# Patient Record
Sex: MALE | Race: BLACK OR AFRICAN AMERICAN | NOT HISPANIC OR LATINO | Employment: OTHER | URBAN - METROPOLITAN AREA
[De-identification: names, ages, dates, MRNs, and addresses within clinical notes are randomized per-mention and may not be internally consistent; named-entity substitution may affect disease eponyms.]

---

## 2017-07-25 ENCOUNTER — APPOINTMENT (EMERGENCY)
Dept: CT IMAGING | Facility: HOSPITAL | Age: 67
End: 2017-07-25
Payer: MEDICARE

## 2017-07-25 ENCOUNTER — HOSPITAL ENCOUNTER (EMERGENCY)
Facility: HOSPITAL | Age: 67
Discharge: HOME | End: 2017-07-26
Attending: EMERGENCY MEDICINE | Admitting: EMERGENCY MEDICINE
Payer: MEDICARE
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
## 2017-07-25 DIAGNOSIS — S09.90XA MINOR HEAD INJURY, INITIAL ENCOUNTER: ICD-10-CM

## 2017-07-25 DIAGNOSIS — F10.929 ALCOHOL INTOXICATION (HCC): Primary | ICD-10-CM

## 2017-07-25 LAB
ALBUMIN SERPL BCP-MCNC: 4 G/DL (ref 3.5–5)
ALP SERPL-CCNC: 61 U/L (ref 46–116)
ALT SERPL W P-5'-P-CCNC: 51 U/L (ref 12–78)
ANION GAP SERPL CALCULATED.3IONS-SCNC: 7 MMOL/L (ref 4–13)
AST SERPL W P-5'-P-CCNC: 92 U/L (ref 5–45)
ATRIAL RATE: 90 BPM
BASOPHILS # BLD AUTO: 0.02 THOUSANDS/ΜL (ref 0–0.1)
BASOPHILS NFR BLD AUTO: 0 % (ref 0–1)
BILIRUB SERPL-MCNC: 0.22 MG/DL (ref 0.2–1)
BUN SERPL-MCNC: 13 MG/DL (ref 5–25)
CALCIUM SERPL-MCNC: 8.9 MG/DL (ref 8.3–10.1)
CHLORIDE SERPL-SCNC: 101 MMOL/L (ref 100–108)
CO2 SERPL-SCNC: 28 MMOL/L (ref 21–32)
CREAT SERPL-MCNC: 1.67 MG/DL (ref 0.6–1.3)
EOSINOPHIL # BLD AUTO: 0.1 THOUSAND/ΜL (ref 0–0.61)
EOSINOPHIL NFR BLD AUTO: 2 % (ref 0–6)
ERYTHROCYTE [DISTWIDTH] IN BLOOD BY AUTOMATED COUNT: 14.5 % (ref 11.6–15.1)
ETHANOL SERPL-MCNC: 583 MG/DL (ref 0–3)
GFR SERPL CREATININE-BSD FRML MDRD: 42 ML/MIN/1.73SQ M
GLUCOSE SERPL-MCNC: 88 MG/DL (ref 65–140)
HCT VFR BLD AUTO: 31.8 % (ref 36.5–49.3)
HGB BLD-MCNC: 10.9 G/DL (ref 12–17)
LYMPHOCYTES # BLD AUTO: 2.5 THOUSANDS/ΜL (ref 0.6–4.47)
LYMPHOCYTES NFR BLD AUTO: 45 % (ref 14–44)
MCH RBC QN AUTO: 31 PG (ref 26.8–34.3)
MCHC RBC AUTO-ENTMCNC: 34.3 G/DL (ref 31.4–37.4)
MCV RBC AUTO: 90 FL (ref 82–98)
MONOCYTES # BLD AUTO: 0.48 THOUSAND/ΜL (ref 0.17–1.22)
MONOCYTES NFR BLD AUTO: 9 % (ref 4–12)
NEUTROPHILS # BLD AUTO: 2.48 THOUSANDS/ΜL (ref 1.85–7.62)
NEUTS SEG NFR BLD AUTO: 44 % (ref 43–75)
NRBC BLD AUTO-RTO: 0 /100 WBCS
P AXIS: 61 DEGREES
PLATELET # BLD AUTO: 193 THOUSANDS/UL (ref 149–390)
PMV BLD AUTO: 9.6 FL (ref 8.9–12.7)
POTASSIUM SERPL-SCNC: 4.5 MMOL/L (ref 3.5–5.3)
PR INTERVAL: 156 MS
PROT SERPL-MCNC: 8 G/DL (ref 6.4–8.2)
QRS AXIS: 77 DEGREES
QRSD INTERVAL: 84 MS
QT INTERVAL: 348 MS
QTC INTERVAL: 425 MS
RBC # BLD AUTO: 3.52 MILLION/UL (ref 3.88–5.62)
SODIUM SERPL-SCNC: 136 MMOL/L (ref 136–145)
T WAVE AXIS: -5 DEGREES
VENTRICULAR RATE: 90 BPM
WBC # BLD AUTO: 5.58 THOUSAND/UL (ref 4.31–10.16)

## 2017-07-25 PROCEDURE — 80053 COMPREHEN METABOLIC PANEL: CPT | Performed by: EMERGENCY MEDICINE

## 2017-07-25 PROCEDURE — 80320 DRUG SCREEN QUANTALCOHOLS: CPT | Performed by: EMERGENCY MEDICINE

## 2017-07-25 PROCEDURE — 96372 THER/PROPH/DIAG INJ SC/IM: CPT

## 2017-07-25 PROCEDURE — 93005 ELECTROCARDIOGRAM TRACING: CPT | Performed by: EMERGENCY MEDICINE

## 2017-07-25 PROCEDURE — 36415 COLL VENOUS BLD VENIPUNCTURE: CPT | Performed by: EMERGENCY MEDICINE

## 2017-07-25 PROCEDURE — 70450 CT HEAD/BRAIN W/O DYE: CPT

## 2017-07-25 PROCEDURE — 72125 CT NECK SPINE W/O DYE: CPT

## 2017-07-25 PROCEDURE — 85025 COMPLETE CBC W/AUTO DIFF WBC: CPT | Performed by: EMERGENCY MEDICINE

## 2017-07-25 RX ORDER — ZIPRASIDONE MESYLATE 20 MG/ML
10 INJECTION, POWDER, LYOPHILIZED, FOR SOLUTION INTRAMUSCULAR ONCE
Status: COMPLETED | OUTPATIENT
Start: 2017-07-25 | End: 2017-07-25

## 2017-07-25 RX ADMIN — ZIPRASIDONE MESYLATE 10 MG: 20 INJECTION, POWDER, LYOPHILIZED, FOR SOLUTION INTRAMUSCULAR at 20:47

## 2017-07-25 RX ADMIN — WATER 0.6 ML: 1 INJECTION INTRAMUSCULAR; INTRAVENOUS; SUBCUTANEOUS at 20:48

## 2017-07-26 VITALS
HEART RATE: 72 BPM | DIASTOLIC BLOOD PRESSURE: 71 MMHG | SYSTOLIC BLOOD PRESSURE: 136 MMHG | WEIGHT: 154.54 LBS | OXYGEN SATURATION: 98 % | RESPIRATION RATE: 16 BRPM | TEMPERATURE: 98.5 F

## 2017-07-26 LAB
ETHANOL EXG-MCNC: 0.06 MG/DL
ETHANOL EXG-MCNC: 0.14 MG/DL
ETHANOL SERPL-MCNC: 137 MG/DL (ref 0–3)

## 2017-07-26 PROCEDURE — 82075 ASSAY OF BREATH ETHANOL: CPT | Performed by: EMERGENCY MEDICINE

## 2017-07-26 PROCEDURE — 80320 DRUG SCREEN QUANTALCOHOLS: CPT | Performed by: EMERGENCY MEDICINE

## 2017-07-26 PROCEDURE — 99285 EMERGENCY DEPT VISIT HI MDM: CPT

## 2017-07-26 PROCEDURE — 36415 COLL VENOUS BLD VENIPUNCTURE: CPT | Performed by: EMERGENCY MEDICINE

## 2017-09-29 ENCOUNTER — ALLSCRIPTS OFFICE VISIT (OUTPATIENT)
Dept: OTHER | Facility: OTHER | Age: 67
End: 2017-09-29

## 2017-09-29 DIAGNOSIS — M54.50 LOW BACK PAIN: ICD-10-CM

## 2017-09-29 DIAGNOSIS — Z12.5 ENCOUNTER FOR SCREENING FOR MALIGNANT NEOPLASM OF PROSTATE: ICD-10-CM

## 2017-10-04 ENCOUNTER — TRANSCRIBE ORDERS (OUTPATIENT)
Dept: ADMINISTRATIVE | Facility: HOSPITAL | Age: 67
End: 2017-10-04

## 2017-10-04 ENCOUNTER — APPOINTMENT (OUTPATIENT)
Dept: LAB | Facility: MEDICAL CENTER | Age: 67
End: 2017-10-04
Payer: MEDICARE

## 2017-10-04 DIAGNOSIS — M54.50 LOW BACK PAIN: ICD-10-CM

## 2017-10-04 DIAGNOSIS — Z12.5 ENCOUNTER FOR SCREENING FOR MALIGNANT NEOPLASM OF PROSTATE: ICD-10-CM

## 2017-10-04 LAB
ALBUMIN SERPL BCP-MCNC: 4 G/DL (ref 3.5–5)
ALP SERPL-CCNC: 63 U/L (ref 46–116)
ALT SERPL W P-5'-P-CCNC: 27 U/L (ref 12–78)
ANION GAP SERPL CALCULATED.3IONS-SCNC: 7 MMOL/L (ref 4–13)
AST SERPL W P-5'-P-CCNC: 43 U/L (ref 5–45)
BASOPHILS # BLD AUTO: 0.01 THOUSANDS/ΜL (ref 0–0.1)
BASOPHILS NFR BLD AUTO: 0 % (ref 0–1)
BILIRUB SERPL-MCNC: 0.58 MG/DL (ref 0.2–1)
BUN SERPL-MCNC: 13 MG/DL (ref 5–25)
CALCIUM SERPL-MCNC: 9.7 MG/DL (ref 8.3–10.1)
CHLORIDE SERPL-SCNC: 101 MMOL/L (ref 100–108)
CO2 SERPL-SCNC: 28 MMOL/L (ref 21–32)
CREAT SERPL-MCNC: 1.47 MG/DL (ref 0.6–1.3)
CRP SERPL QL: <3 MG/L
EOSINOPHIL # BLD AUTO: 0.04 THOUSAND/ΜL (ref 0–0.61)
EOSINOPHIL NFR BLD AUTO: 1 % (ref 0–6)
ERYTHROCYTE [DISTWIDTH] IN BLOOD BY AUTOMATED COUNT: 14.1 % (ref 11.6–15.1)
ERYTHROCYTE [SEDIMENTATION RATE] IN BLOOD: 16 MM/HOUR (ref 0–10)
GFR SERPL CREATININE-BSD FRML MDRD: 57 ML/MIN/1.73SQ M
GLUCOSE SERPL-MCNC: 99 MG/DL (ref 65–140)
HCT VFR BLD AUTO: 34.4 % (ref 36.5–49.3)
HGB BLD-MCNC: 11.7 G/DL (ref 12–17)
LYMPHOCYTES # BLD AUTO: 2.08 THOUSANDS/ΜL (ref 0.6–4.47)
LYMPHOCYTES NFR BLD AUTO: 46 % (ref 14–44)
MCH RBC QN AUTO: 31.5 PG (ref 26.8–34.3)
MCHC RBC AUTO-ENTMCNC: 34 G/DL (ref 31.4–37.4)
MCV RBC AUTO: 93 FL (ref 82–98)
MONOCYTES # BLD AUTO: 0.3 THOUSAND/ΜL (ref 0.17–1.22)
MONOCYTES NFR BLD AUTO: 7 % (ref 4–12)
NEUTROPHILS # BLD AUTO: 2.13 THOUSANDS/ΜL (ref 1.85–7.62)
NEUTS SEG NFR BLD AUTO: 46 % (ref 43–75)
NRBC BLD AUTO-RTO: 0 /100 WBCS
PLATELET # BLD AUTO: 275 THOUSANDS/UL (ref 149–390)
PMV BLD AUTO: 9.8 FL (ref 8.9–12.7)
POTASSIUM SERPL-SCNC: 4.2 MMOL/L (ref 3.5–5.3)
PROT SERPL-MCNC: 8.3 G/DL (ref 6.4–8.2)
PSA SERPL-MCNC: 3.6 NG/ML (ref 0–4)
RBC # BLD AUTO: 3.72 MILLION/UL (ref 3.88–5.62)
SODIUM SERPL-SCNC: 136 MMOL/L (ref 136–145)
WBC # BLD AUTO: 4.56 THOUSAND/UL (ref 4.31–10.16)

## 2017-10-04 PROCEDURE — 80053 COMPREHEN METABOLIC PANEL: CPT

## 2017-10-04 PROCEDURE — 85025 COMPLETE CBC W/AUTO DIFF WBC: CPT

## 2017-10-04 PROCEDURE — 36415 COLL VENOUS BLD VENIPUNCTURE: CPT

## 2017-10-04 PROCEDURE — 86140 C-REACTIVE PROTEIN: CPT

## 2017-10-04 PROCEDURE — 85652 RBC SED RATE AUTOMATED: CPT

## 2017-10-04 PROCEDURE — G0103 PSA SCREENING: HCPCS

## 2017-10-05 ENCOUNTER — GENERIC CONVERSION - ENCOUNTER (OUTPATIENT)
Dept: OTHER | Facility: OTHER | Age: 67
End: 2017-10-05

## 2018-01-10 NOTE — RESULT NOTES
Verified Results  (1) PSA (SCREEN) (Dx V76 44 Screen for Prostate Cancer) [id number] 11:54AM Brittny Arredondo Order Number: [id number]     Order Number: [id number]     Test Name Result Flag Reference   PROSTATE SPECIFIC ANTIGEN 2 0 ng/mL  0 0-4 0     (1) URINALYSIS (will reflex a microscopy if leukocytes, occult blood, protein or nitrites are not within normal limits) [id number] 11:48AM Brittny Arredondo Order Number: [id number]    TW Order Number: [id number]     Test Name Result Flag Reference   COLOR Yellow     CLARITY Cloudy     SPECIFIC GRAVITY UA 1 019  1 003-1 030   PH UA 5 0  4 5-8 0   LEUKOCYTE ESTERASE UA Negative  Negative   NITRITE UA Negative  Negative   PROTEIN UA Trace mg/dl A Negative   GLUCOSE  (1/4%) mg/dl A Negative   KETONES UA Trace mg/dl A Negative   UROBILINOGEN UA 0 2 E U /dl  0 2, 1 0 E U /dl   BILIRUBIN UA Negative  Negative   BLOOD UA Trace A Negative   BACTERIA None Seen /hpf  None Seen, Occasional   EPITHELIAL CELLS None Seen /hpf  None Seen, Occasional   HYALINE CASTS 5-10 /lpf A 0-3   RBC UA None Seen /hpf  None Seen, 2-4, 0-1, 1-2   WBC UA None Seen /hpf  None Seen, 2-4     (1) COMPREHENSIVE METABOLIC PANEL [id number] 22:36TI Juana Alberto    Order Number: [id number]      National Kidney Disease Education Program recommendations are as follows:  GFR calculation is accurate only with a steady state creatinine  Chronic Kidney disease less than 60 ml/min/1 73 sq  meters  Kidney failure less than 15 ml/min/1 73 sq  meters  Test Name Result Flag Reference   GLUCOSE,RANDM 75 mg/dL     If the patient is fasting, the ADA then defines impaired fasting glucose as > 100 mg/dL and diabetes as > or equal to 123 mg/dL     SODIUM 142 mmol/L  136-145   POTASSIUM 4 0 mmol/L  3 5-5 3   CHLORIDE 108 mmol/L  100-108   CARBON DIOXIDE 28 mmol/L  21-32   ANION GAP (CALC) 6 mmol/L  4-13   BLOOD UREA NITROGEN 10 mg/dL  5-25   CREATININE 1 13 mg/dL  0 60-1 30   Standardized to [facility] reference method   CALCIUM 8 0 mg/dL L 8 3-10 1   BILI, TOTAL 0 24 mg/dL  0 20-1 00   ALK PHOSPHATAS 62 U/L     ALT (SGPT) 20 U/L  12-78   AST(SGOT) 19 U/L  5-45   ALBUMIN 3 5 g/dL  3 5-5 0   TOTAL PROTEIN 7 0 g/dL  6 4-8 2   eGFR Non-African American      >60 0 ml/min/1 73sq m     (1) LIPID PANEL, FASTING [id number] 11:42AM Elver Fenton Order Number: [id number]      Triglyceride:         Normal              <150 mg/dl       Borderline High    150-199 mg/dl       High               200-499 mg/dl       Very High          >499 mg/dl  Cholesterol:         Desirable        <200 mg/dl      Borderline High  200-239 mg/dl      High             >239 mg/dl  HDL Cholesterol:        High    >59 mg/dL      Low     <41 mg/dL  LDL CALCULATED:    This screening LDL is a calculated result  It does not have the accuracy of the Direct Measured LDL in the monitoring of patients with hyperlipidemia and/or statin therapy  Direct Measure LDL (WEB165) must be ordered separately in these patients  Test Name Result Flag Reference   CHOLESTEROL 234 mg/dL H    HDL,DIRECT 115 mg/dL H 40-60   LDL CHOLESTEROL CALCULATED 103 mg/dL H 0-100   TRIGLYCERIDES 78 mg/dL  <=150     (1) TSH [id number] 11:42AM Elver Fenton Order Number: [id number]    Patients undergoing fluorescein dye angiography may retain small amounts of fluorescein in the body for 48-72 hours post procedure  Samples containing fluorescein can produce falsely depressed TSH values  If the patient had this procedure,a specimen should be resubmitted post fluorescein clearance  Test Name Result Flag Reference   TSH 1 070 uIU/mL  0 358-3 740       Plan  Glucosuria    · (1) BASIC METABOLIC PROFILE; Status:Active;  Requested GPJ:[id number];

## 2018-01-12 NOTE — RESULT NOTES
Verified Results  (1) CBC/PLT/DIFF 04Oct2017 01:45PM Tala Aus Order Number: [id number]     Test Name Result Flag Reference   WBC COUNT 4 56 Thousand/uL  4 31-10 16   RBC COUNT 3 72 Million/uL L 3 88-5 62   HEMOGLOBIN 11 7 g/dL L 12 0-17 0   HEMATOCRIT 34 4 % L 36 5-49 3   MCV 93 fL  82-98   MCH 31 5 pg  26 8-34 3   MCHC 34 0 g/dL  31 4-37 4   RDW 14 1 %  11 6-15 1   MPV 9 8 fL  8 9-12 7   PLATELET COUNT 996 Thousands/uL  149-390   nRBC AUTOMATED 0 /100 WBCs     NEUTROPHILS RELATIVE PERCENT 46 %  43-75   LYMPHOCYTES RELATIVE PERCENT 46 % H 14-44   MONOCYTES RELATIVE PERCENT 7 %  4-12   EOSINOPHILS RELATIVE PERCENT 1 %  0-6   BASOPHILS RELATIVE PERCENT 0 %  0-1   NEUTROPHILS ABSOLUTE COUNT 2 13 Thousands/? ??L  1 85-7 62   LYMPHOCYTES ABSOLUTE COUNT 2 08 Thousands/? ??L  0 60-4 47   MONOCYTES ABSOLUTE COUNT 0 30 Thousand/? ??L  0 17-1 22   EOSINOPHILS ABSOLUTE COUNT 0 04 Thousand/? ??L  0 00-0 61   BASOPHILS ABSOLUTE COUNT 0 01 Thousands/? ??L  0 00-0 10     (1) C-REACTIVE PROTEIN 04Oct2017 01:45PM Tala Aus Order Number: [id number]     Test Name Result Flag Reference   C-REACT PROTEIN <3 0 mg/L  <3 0     (1) COMPREHENSIVE METABOLIC PANEL [id number] 20:33GJ Tala Aus Order Number: [id number]     Test Name Result Flag Reference   GLUCOSE,RANDM 99 mg/dL     If the patient is fasting, the ADA then defines impaired fasting glucose as > 100 mg/dL and diabetes as > or equal to 123 mg/dL  Specimen collection should occur prior to Sulfasalazine administration due to the potential for falsely depressed results  Specimen collection should occur prior to Sulfapyridine administration due to the potential for falsely elevated results     SODIUM 136 mmol/L  136-145   POTASSIUM 4 2 mmol/L  3 5-5 3   CHLORIDE 101 mmol/L  100-108   CARBON DIOXIDE 28 mmol/L  21-32   ANION GAP (CALC) 7 mmol/L  4-13   BLOOD UREA NITROGEN 13 mg/dL  5-25   CREATININE 1 47 mg/dL H 0 60-1 30 Standardized to IDMS reference method   CALCIUM 9 7 mg/dL  8 3-10 1   BILI, TOTAL 0 58 mg/dL  0 20-1 00   ALK PHOSPHATAS 63 U/L     ALT (SGPT) 27 U/L  12-78   Specimen collection should occur prior to Sulfasalazine and/or Sulfapyridine administration due to the potential for falsely depressed results  AST(SGOT) 43 U/L  5-45   Specimen collection should occur prior to Sulfasalazine administration due to the potential for falsely depressed results  ALBUMIN 4 0 g/dL  3 5-5 0   TOTAL PROTEIN 8 3 g/dL H 6 4-8 2   eGFR 57 ml/min/1 73sq m     National Kidney Disease Education Program recommendations are as follows:  GFR calculation is accurate only with a steady state creatinine  Chronic Kidney disease less than 60 ml/min/1 73 sq  meters  Kidney failure less than 15 ml/min/1 73 sq  meters  (1) SED RATE [id number] 01:45PM Debrah Ahumada    Order Number: [id number]     Test Name Result Flag Reference   SED RATE 16 mm/hour H 0-10     (1) PSA (SCREEN) (Dx V76 44 Screen for Prostate Cancer) [id number] 01:45PM Daril Hodgkins Order Number: [id number]     Test Name Result Flag Reference   PROSTATE SPECIFIC ANTIGEN 3 6 ng/mL  0 0-4 0   American Urological Association Guidelines define biochemical recurrence of prostate cancer as a detectable or rising PSA value post-radical prostatectomy that is greater than or equal to 0 2 ng/mL with a second confirmatory level of greater than or equal to 0 2 ng/mL

## 2018-01-14 VITALS
TEMPERATURE: 97.5 F | DIASTOLIC BLOOD PRESSURE: 82 MMHG | HEIGHT: 69 IN | RESPIRATION RATE: 18 BRPM | BODY MASS INDEX: 22.55 KG/M2 | HEART RATE: 106 BPM | WEIGHT: 152.25 LBS | SYSTOLIC BLOOD PRESSURE: 118 MMHG

## 2018-01-14 NOTE — RESULT NOTES
Verified Results  (1) CBC/PLT/DIFF [id number] 12:05PM Fredrick Tse   TW Order Number: [id number]    TW Order Number: [id number]     Test Name Result Flag Reference   WBC COUNT 4 18 Thousand/uL L 4 31-10 16   RBC COUNT 4 05 Million/uL  3 88-5 62   HEMOGLOBIN 12 4 g/dL  12 0-17 0   HEMATOCRIT 37 7 %  36 5-49 3   MCV 93 1 fL  82 0-98 0   MCH 30 6 pg  26 8-34 3   MCHC 32 9 g/dL  31 4-37 4   RDW 13 6 %  11 6-15 1   MPV 10 1 fL  8 9-12 7   PLATELET COUNT 517 Thousands/uL  149-390   nRBC AUTOMATED 0 /100 WBCs     NEUTROPHILS RELATIVE PERCENT 48 %  43-75   LYMPHOCYTES RELATIVE PERCENT 41 %  14-44   MONOCYTES RELATIVE PERCENT 9 %  4-12   EOSINOPHILS RELATIVE PERCENT 2 %  0-6   BASOPHILS RELATIVE PERCENT 0 %  0-1   NEUTROPHILS ABSOLUTE COUNT 1 99 Thousands/µL  1 85-7 62   LYMPHOCYTES ABSOLUTE COUNT 1 73 Thousands/µL  0 60-4 47   MONOCYTES ABSOLUTE COUNT 0 37 Thousand/µL  0 17-1 22   EOSINOPHILS ABSOLUTE COUNT 0 07 Thousand/µL  0 00-0 61   BASOPHILS ABSOLUTE COUNT 0 01 Thousands/µL  0 00-0 10       Plan  Leukopenia    · (1) CBC/PLT/DIFF; Status:Active;  Requested for:18Apr2016;

## 2018-01-15 NOTE — MISCELLANEOUS
Message      Date: 19 Apr 2016 1:43 PM EST, Recorded By: Lupe Andersen For: eJrica Mathews   Caller: Jennifer Isaac   Message left on patient's s voicemail requesting a callback with information if he has had any prior colonoscopies  His daughter called back stating that this would be his first study  She was calling to cancel the consultation appointment as he is scheduled for cataract surgery  He will call back to reschedule the consultation when he is recovered from the cataract surgery  She did not want to reschedule at the time of this call  Active Problems    1  Encounter for colorectal cancer screening (V76 51) (Z12 11,Z12 12)   2  Encounter for screening for malignant neoplasm of prostate (V76 44) (Z12 5)   3  Glucosuria (791 5) (R81)   4  Leukopenia (288 50) (D72 819)   5  Need for Tdap vaccination (V06 1) (Z23)    Current Meds   1  No Reported Medications Recorded    Allergies    1   No Known Drug Allergies    Signatures   Electronically signed by : Jennifer Isaac, ; Apr 19 2016  1:50PM EST                       (Author)

## 2018-03-05 ENCOUNTER — OFFICE VISIT (OUTPATIENT)
Dept: FAMILY MEDICINE CLINIC | Facility: CLINIC | Age: 68
End: 2018-03-05
Payer: MEDICARE

## 2018-03-05 VITALS
WEIGHT: 163 LBS | DIASTOLIC BLOOD PRESSURE: 80 MMHG | HEIGHT: 69 IN | BODY MASS INDEX: 24.14 KG/M2 | HEART RATE: 86 BPM | SYSTOLIC BLOOD PRESSURE: 120 MMHG | RESPIRATION RATE: 20 BRPM | TEMPERATURE: 97 F | OXYGEN SATURATION: 99 %

## 2018-03-05 DIAGNOSIS — E11.9 CONTROLLED TYPE 2 DIABETES MELLITUS WITHOUT COMPLICATION, UNSPECIFIED LONG TERM INSULIN USE STATUS: Primary | ICD-10-CM

## 2018-03-05 DIAGNOSIS — B35.1 ONYCHOMYCOSIS: ICD-10-CM

## 2018-03-05 PROCEDURE — 99213 OFFICE O/P EST LOW 20 MIN: CPT | Performed by: PODIATRIST

## 2018-03-05 NOTE — PROGRESS NOTES
Routine Foot Care- Podiatry   Galen Lo 80 y o  male MRN: [id number]  Encounter: [id number]    Assessment/Plan     Assessment:  1  Onychomycosis  2  DM    Plan:  - Patient was seen/examined  All questions and concerns addressed  - Nails debrided x10 without incidence utilizing a sharp nail nipper  - Stressed the importance of glycemic control, shoe gear, and proper diabetic foot care  - RTC in 1 year      History of Present Illness     HPI:  Galen Lo is a 80 y o  male who presents with elongated toenails  States that their nails are painful, elongated  They have difficulty applying their socks and shoes  The pressure within their shoe gear is painful and they have been unable to cut their nails adequately  Patient denies numbness/tingling  He does not know his last blood sugar  The patient denies any nausea, vomiting, fever, chills, shortness of breath, or chest pains  Consults  Review of Systems   Constitutional: Negative  HENT: Negative  Eyes: Negative  Respiratory: Negative  Cardiovascular: Negative  Gastrointestinal: Negative  Musculoskeletal: Negative   Skin: elongated thickened toenails  Neurological: Negative  Historical Information   No past medical history on file  No past surgical history on file    Social History   History   Alcohol Use    2 4 oz/week    4 Cans of beer per week     Comment: on weekends     History   Drug Use No     History   Smoking Status    Never Smoker   Smokeless Tobacco    Never Used     Comment: Former smoker /quit 2010 as per [employer]     Family History:   Family History   Problem Relation Age of Onset    Hypertension Mother     Stroke Mother        Meds/Allergies     (Not in a hospital admission)  No Known Allergies    Objective     Current Vitals:   Blood Pressure: 120/80 (03/05/18 1037)  Pulse: 86 (03/05/18 1037)  Temperature: (!) 97 °F (36 1 °C) (03/05/18 1037)  Temp Source: Tympanic (03/05/18 1037)  Respirations: 20 (03/05/18 1037)  Height: 5' 9" (175 3 cm) (03/05/18 1037)  Weight - Scale: 73 9 kg (163 lb) (03/05/18 1037)  SpO2: 99 % (03/05/18 1037)        /80   Pulse 86   Temp (!) 97 °F (36 1 °C) (Tympanic)   Resp 20   Ht 5' 9" (1 753 m)   Wt 73 9 kg (163 lb)   SpO2 99%   BMI 24 07 kg/m²     General Appearance:    Alert, cooperative, no distress   Head:    Normocephalic, without obvious abnormality, atraumatic   Eyes:    PERRL, conjunctiva/corneas clear, EOM's intact            Nose:   Moist mucous membranes, no drainage or sinus tenderness   Throat:   No tenderness, no exudates   Neck:   Supple, symmetrical, trachea midline, no JVD   Back:     Symmetric, no CVA tenderness   Lungs:     Clear to auscultation bilaterally, respirations unlabored   Chest wall:    No tenderness or deformity   Heart:    Regular rate and rhythm, S1 and S2 normal, no murmur, rub   or gallop   Abdomen:     Soft, non-tender, bowel sounds active all four quadrants,     no masses, no organomegaly           Extremities:   MMT is 5/5 to all compartments of the LE, +0/4 edema B/L, Digital ROM is intact, b/l HAV deformity   Pulses:   R DP is +2/4, R PT is +2/4, L DP is +2/4, L PT is +2/4, CFT< 3sec to all digits  Pedal hair is Absent   Skin:   Nails are thickened, elongated, TTP with notable subungual debris  No open Lesions  Skin of the LE is normal texture, turgor  Neurologic:   CNII-XII intact  Normal strength, sensation and reflexes       Throughout  Gross sensation is intact   Protective sensation is Intact

## 2018-08-16 ENCOUNTER — OFFICE VISIT (OUTPATIENT)
Dept: FAMILY MEDICINE CLINIC | Facility: CLINIC | Age: 68
End: 2018-08-16
Payer: MEDICARE

## 2018-08-16 VITALS
WEIGHT: 162 LBS | BODY MASS INDEX: 23.99 KG/M2 | HEART RATE: 108 BPM | SYSTOLIC BLOOD PRESSURE: 98 MMHG | RESPIRATION RATE: 18 BRPM | HEIGHT: 69 IN | DIASTOLIC BLOOD PRESSURE: 74 MMHG

## 2018-08-16 DIAGNOSIS — H57.13 PAIN OF BOTH EYES: ICD-10-CM

## 2018-08-16 DIAGNOSIS — G89.29 CHRONIC BILATERAL LOW BACK PAIN WITHOUT SCIATICA: Primary | ICD-10-CM

## 2018-08-16 DIAGNOSIS — M54.50 CHRONIC BILATERAL LOW BACK PAIN WITHOUT SCIATICA: Primary | ICD-10-CM

## 2018-08-16 PROBLEM — D64.9 ANEMIA: Status: ACTIVE | Noted: 2017-10-05

## 2018-08-16 PROCEDURE — 99213 OFFICE O/P EST LOW 20 MIN: CPT | Performed by: FAMILY MEDICINE

## 2018-08-16 RX ORDER — METHOCARBAMOL 500 MG/1
500 TABLET, FILM COATED ORAL 3 TIMES DAILY
Qty: 30 TABLET | Refills: 1 | Status: SHIPPED | OUTPATIENT
Start: 2018-08-16 | End: 2018-08-29 | Stop reason: CLARIF

## 2018-08-16 RX ORDER — OLOPATADINE HYDROCHLORIDE 1 MG/ML
1 SOLUTION/ DROPS OPHTHALMIC 2 TIMES DAILY
Qty: 5 ML | Refills: 0 | Status: SHIPPED | OUTPATIENT
Start: 2018-08-16 | End: 2018-08-27 | Stop reason: CLARIF

## 2018-08-16 RX ORDER — MELOXICAM 7.5 MG/1
1 TABLET ORAL 2 TIMES DAILY
COMMUNITY
Start: 2017-09-29 | End: 2018-08-16 | Stop reason: CLARIF

## 2018-08-16 NOTE — PROGRESS NOTES
Assessment/Plan:    Low back pain  Meds  lab , xray and PT       Diagnoses and all orders for this visit:    Chronic bilateral low back pain without sciatica  -     XR spine lumbar minimum 4 views non injury; Future  -     CBC and differential; Future  -     Comprehensive metabolic panel; Future  -     C-reactive protein; Future  -     Sedimentation rate, automated; Future  -     oxaprozin (DAYPRO) 600 MG tablet; Take 2 tablets (1,200 mg total) by mouth daily  -     methocarbamol (ROBAXIN) 500 mg tablet; Take 1 tablet (500 mg total) by mouth 3 (three) times a day  -     Ambulatory referral to Physical Therapy; Future    Pain of both eyes  -     Ambulatory referral to Ophthalmology; Future  -     olopatadine (PATANOL) 0 1 % ophthalmic solution; Administer 1 drop to both eyes 2 (two) times a day          Subjective:      Patient ID: Rivka Edgar is a 80 y o  male  Eye Pain    Both eyes are affected  This is a new problem  The current episode started 1 to 4 weeks ago  The problem occurs constantly  The problem has been waxing and waning  There was no injury mechanism  The pain is at a severity of 5/10  The pain is moderate  There is no known exposure to pink eye  He does not wear contacts  Associated symptoms include blurred vision, eye redness and itching  Pertinent negatives include no eye discharge, double vision, fever, nausea, recent URI or vomiting  He has tried nothing for the symptoms  Back Pain   This is a chronic problem  The current episode started more than 1 year ago  The problem occurs constantly  The problem has been waxing and waning since onset  The pain is present in the lumbar spine  The quality of the pain is described as cramping  The pain does not radiate  The pain is at a severity of 6/10  The pain is moderate  The pain is the same all the time  The symptoms are aggravated by twisting  Associated symptoms include numbness   Pertinent negatives include no bladder incontinence, bowel incontinence, chest pain, fever or leg pain  He has tried NSAIDs and bed rest for the symptoms  The treatment provided no relief  The following portions of the patient's history were reviewed and updated as appropriate: allergies, current medications, past family history, past medical history, past social history, past surgical history and problem list       Review of Systems   Constitutional: Negative for fever  Eyes: Positive for blurred vision, pain, redness and itching  Negative for double vision and discharge  Respiratory: Negative for shortness of breath  Cardiovascular: Negative for chest pain  Gastrointestinal: Negative for bowel incontinence, nausea and vomiting  Genitourinary: Negative for bladder incontinence  Musculoskeletal: Positive for back pain  Neurological: Positive for numbness  Objective:      BP 98/74 (BP Location: Left arm, Patient Position: Sitting, Cuff Size: Standard)   Pulse (!) 108   Resp 18   Ht 5' 9" (1 753 m)   Wt 73 5 kg (162 lb)   BMI 23 92 kg/m²          Physical Exam   Constitutional: He appears well-developed and well-nourished  Cardiovascular: Normal rate, regular rhythm and normal heart sounds  Pulmonary/Chest: Breath sounds normal    Musculoskeletal: He exhibits tenderness  Lumbar back: He exhibits decreased range of motion, tenderness and spasm  Vitals reviewed

## 2018-08-17 ENCOUNTER — HOSPITAL ENCOUNTER (INPATIENT)
Facility: HOSPITAL | Age: 68
LOS: 1 days | Discharge: HOME | DRG: 897 | End: 2018-08-19
Attending: EMERGENCY MEDICINE | Admitting: INTERNAL MEDICINE
Payer: MEDICARE

## 2018-08-17 ENCOUNTER — APPOINTMENT (EMERGENCY)
Dept: RADIOLOGY | Facility: HOSPITAL | Age: 68
DRG: 897 | End: 2018-08-17
Payer: MEDICARE

## 2018-08-17 DIAGNOSIS — T51.0X1A: Primary | ICD-10-CM

## 2018-08-17 DIAGNOSIS — S01.81XA CHIN LACERATION: ICD-10-CM

## 2018-08-17 DIAGNOSIS — F10.129 ALCOHOL ABUSE WITH INTOXICATION (HCC): ICD-10-CM

## 2018-08-17 LAB
ALBUMIN SERPL BCP-MCNC: 3.9 G/DL (ref 3.5–5)
ALP SERPL-CCNC: 67 U/L (ref 46–116)
ALT SERPL W P-5'-P-CCNC: 31 U/L (ref 12–78)
ANION GAP SERPL CALCULATED.3IONS-SCNC: 17 MMOL/L (ref 4–13)
AST SERPL W P-5'-P-CCNC: 49 U/L (ref 5–45)
BASOPHILS # BLD AUTO: 0.04 THOUSANDS/ΜL (ref 0–0.1)
BASOPHILS NFR BLD AUTO: 1 % (ref 0–1)
BILIRUB SERPL-MCNC: 0.31 MG/DL (ref 0.2–1)
BUN SERPL-MCNC: 25 MG/DL (ref 5–25)
CALCIUM SERPL-MCNC: 8.8 MG/DL (ref 8.3–10.1)
CHLORIDE SERPL-SCNC: 103 MMOL/L (ref 100–108)
CO2 SERPL-SCNC: 17 MMOL/L (ref 21–32)
CREAT SERPL-MCNC: 2.07 MG/DL (ref 0.6–1.3)
EOSINOPHIL # BLD AUTO: 0.09 THOUSAND/ΜL (ref 0–0.61)
EOSINOPHIL NFR BLD AUTO: 1 % (ref 0–6)
ERYTHROCYTE [DISTWIDTH] IN BLOOD BY AUTOMATED COUNT: 14.1 % (ref 11.6–15.1)
ETHANOL SERPL-MCNC: 381 MG/DL (ref 0–3)
GFR SERPL CREATININE-BSD FRML MDRD: 37 ML/MIN/1.73SQ M
GLUCOSE SERPL-MCNC: 80 MG/DL (ref 65–140)
HCT VFR BLD AUTO: 34.4 % (ref 36.5–49.3)
HGB BLD-MCNC: 11 G/DL (ref 12–17)
IMM GRANULOCYTES # BLD AUTO: 0.01 THOUSAND/UL (ref 0–0.2)
IMM GRANULOCYTES NFR BLD AUTO: 0 % (ref 0–2)
LYMPHOCYTES # BLD AUTO: 4.26 THOUSANDS/ΜL (ref 0.6–4.47)
LYMPHOCYTES NFR BLD AUTO: 50 % (ref 14–44)
MCH RBC QN AUTO: 30.4 PG (ref 26.8–34.3)
MCHC RBC AUTO-ENTMCNC: 32 G/DL (ref 31.4–37.4)
MCV RBC AUTO: 95 FL (ref 82–98)
MONOCYTES # BLD AUTO: 0.5 THOUSAND/ΜL (ref 0.17–1.22)
MONOCYTES NFR BLD AUTO: 6 % (ref 4–12)
NEUTROPHILS # BLD AUTO: 3.53 THOUSANDS/ΜL (ref 1.85–7.62)
NEUTS SEG NFR BLD AUTO: 42 % (ref 43–75)
NRBC BLD AUTO-RTO: 0 /100 WBCS
PLATELET # BLD AUTO: 259 THOUSANDS/UL (ref 149–390)
PMV BLD AUTO: 10.2 FL (ref 8.9–12.7)
POTASSIUM SERPL-SCNC: 3.8 MMOL/L (ref 3.5–5.3)
PROT SERPL-MCNC: 7.7 G/DL (ref 6.4–8.2)
RBC # BLD AUTO: 3.62 MILLION/UL (ref 3.88–5.62)
SODIUM SERPL-SCNC: 137 MMOL/L (ref 136–145)
WBC # BLD AUTO: 8.43 THOUSAND/UL (ref 4.31–10.16)

## 2018-08-17 PROCEDURE — 85025 COMPLETE CBC W/AUTO DIFF WBC: CPT | Performed by: EMERGENCY MEDICINE

## 2018-08-17 PROCEDURE — 90715 TDAP VACCINE 7 YRS/> IM: CPT | Performed by: EMERGENCY MEDICINE

## 2018-08-17 PROCEDURE — 36415 COLL VENOUS BLD VENIPUNCTURE: CPT | Performed by: EMERGENCY MEDICINE

## 2018-08-17 PROCEDURE — 70450 CT HEAD/BRAIN W/O DYE: CPT

## 2018-08-17 PROCEDURE — 80053 COMPREHEN METABOLIC PANEL: CPT | Performed by: EMERGENCY MEDICINE

## 2018-08-17 PROCEDURE — 72125 CT NECK SPINE W/O DYE: CPT

## 2018-08-17 PROCEDURE — 90471 IMMUNIZATION ADMIN: CPT

## 2018-08-17 PROCEDURE — 80320 DRUG SCREEN QUANTALCOHOLS: CPT | Performed by: EMERGENCY MEDICINE

## 2018-08-17 PROCEDURE — 99285 EMERGENCY DEPT VISIT HI MDM: CPT

## 2018-08-17 RX ORDER — LIDOCAINE HYDROCHLORIDE 10 MG/ML
5 INJECTION, SOLUTION EPIDURAL; INFILTRATION; INTRACAUDAL; PERINEURAL ONCE
Status: COMPLETED | OUTPATIENT
Start: 2018-08-17 | End: 2018-08-17

## 2018-08-17 RX ADMIN — LIDOCAINE HYDROCHLORIDE 5 ML: 10 INJECTION, SOLUTION EPIDURAL; INFILTRATION; INTRACAUDAL; PERINEURAL at 23:06

## 2018-08-17 RX ADMIN — TETANUS TOXOID, REDUCED DIPHTHERIA TOXOID AND ACELLULAR PERTUSSIS VACCINE, ADSORBED 0.5 ML: 5; 2.5; 8; 8; 2.5 SUSPENSION INTRAMUSCULAR at 22:07

## 2018-08-17 RX ADMIN — SODIUM CHLORIDE 1000 ML: 0.9 INJECTION, SOLUTION INTRAVENOUS at 23:05

## 2018-08-18 PROBLEM — N17.9 AKI (ACUTE KIDNEY INJURY) (HCC): Status: ACTIVE | Noted: 2018-08-18
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
## 2018-08-18 PROBLEM — F10.129 ALCOHOL ABUSE WITH INTOXICATION (HCC): Status: ACTIVE | Noted: 2018-08-18
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
## 2018-08-18 LAB
ANION GAP SERPL CALCULATED.3IONS-SCNC: 14 MMOL/L (ref 4–13)
ANION GAP SERPL CALCULATED.3IONS-SCNC: 16 MMOL/L (ref 4–13)
BUN SERPL-MCNC: 15 MG/DL (ref 5–25)
BUN SERPL-MCNC: 15 MG/DL (ref 5–25)
CALCIUM SERPL-MCNC: 6.8 MG/DL (ref 8.3–10.1)
CALCIUM SERPL-MCNC: 7 MG/DL (ref 8.3–10.1)
CHLORIDE SERPL-SCNC: 112 MMOL/L (ref 100–108)
CHLORIDE SERPL-SCNC: 113 MMOL/L (ref 100–108)
CO2 SERPL-SCNC: 15 MMOL/L (ref 21–32)
CO2 SERPL-SCNC: 15 MMOL/L (ref 21–32)
CREAT SERPL-MCNC: 1.08 MG/DL (ref 0.6–1.3)
CREAT SERPL-MCNC: 1.11 MG/DL (ref 0.6–1.3)
ETHANOL SERPL-MCNC: 47 MG/DL (ref 0–3)
GFR SERPL CREATININE-BSD FRML MDRD: 79 ML/MIN/1.73SQ M
GFR SERPL CREATININE-BSD FRML MDRD: 82 ML/MIN/1.73SQ M
GLUCOSE SERPL-MCNC: 45 MG/DL (ref 65–140)
GLUCOSE SERPL-MCNC: 45 MG/DL (ref 65–140)
MAGNESIUM SERPL-MCNC: 1.6 MG/DL (ref 1.6–2.6)
PHOSPHATE SERPL-MCNC: 2.2 MG/DL (ref 2.3–4.1)
PLATELET # BLD AUTO: 206 THOUSANDS/UL (ref 149–390)
PMV BLD AUTO: 9.5 FL (ref 8.9–12.7)
POTASSIUM SERPL-SCNC: 3.5 MMOL/L (ref 3.5–5.3)
POTASSIUM SERPL-SCNC: 3.5 MMOL/L (ref 3.5–5.3)
SODIUM SERPL-SCNC: 142 MMOL/L (ref 136–145)
SODIUM SERPL-SCNC: 143 MMOL/L (ref 136–145)

## 2018-08-18 PROCEDURE — 80048 BASIC METABOLIC PNL TOTAL CA: CPT | Performed by: INTERNAL MEDICINE

## 2018-08-18 PROCEDURE — 99221 1ST HOSP IP/OBS SF/LOW 40: CPT | Performed by: PSYCHIATRY & NEUROLOGY

## 2018-08-18 PROCEDURE — 84100 ASSAY OF PHOSPHORUS: CPT | Performed by: PHYSICIAN ASSISTANT

## 2018-08-18 PROCEDURE — 99222 1ST HOSP IP/OBS MODERATE 55: CPT | Performed by: PHYSICIAN ASSISTANT

## 2018-08-18 PROCEDURE — 83735 ASSAY OF MAGNESIUM: CPT | Performed by: PHYSICIAN ASSISTANT

## 2018-08-18 PROCEDURE — 94760 N-INVAS EAR/PLS OXIMETRY 1: CPT

## 2018-08-18 PROCEDURE — 85049 AUTOMATED PLATELET COUNT: CPT | Performed by: INTERNAL MEDICINE

## 2018-08-18 PROCEDURE — 80320 DRUG SCREEN QUANTALCOHOLS: CPT | Performed by: PHYSICIAN ASSISTANT

## 2018-08-18 PROCEDURE — 80048 BASIC METABOLIC PNL TOTAL CA: CPT | Performed by: PHYSICIAN ASSISTANT

## 2018-08-18 RX ORDER — FOLIC ACID 1 MG/1
1 TABLET ORAL DAILY
Status: DISCONTINUED | OUTPATIENT
Start: 2018-08-18 | End: 2018-08-19 | Stop reason: HOSPADM

## 2018-08-18 RX ORDER — HEPARIN SODIUM 5000 [USP'U]/ML
5000 INJECTION, SOLUTION INTRAVENOUS; SUBCUTANEOUS EVERY 8 HOURS SCHEDULED
Status: DISCONTINUED | OUTPATIENT
Start: 2018-08-18 | End: 2018-08-19 | Stop reason: HOSPADM

## 2018-08-18 RX ORDER — THIAMINE MONONITRATE (VIT B1) 100 MG
100 TABLET ORAL DAILY
Status: DISCONTINUED | OUTPATIENT
Start: 2018-08-18 | End: 2018-08-19 | Stop reason: HOSPADM

## 2018-08-18 RX ORDER — SODIUM CHLORIDE 9 MG/ML
100 INJECTION, SOLUTION INTRAVENOUS CONTINUOUS
Status: DISCONTINUED | OUTPATIENT
Start: 2018-08-18 | End: 2018-08-19 | Stop reason: HOSPADM

## 2018-08-18 RX ORDER — LORAZEPAM 2 MG/ML
1 INJECTION INTRAMUSCULAR EVERY 4 HOURS PRN
Status: DISCONTINUED | OUTPATIENT
Start: 2018-08-18 | End: 2018-08-19 | Stop reason: HOSPADM

## 2018-08-18 RX ORDER — METHOCARBAMOL 500 MG/1
500 TABLET, FILM COATED ORAL 3 TIMES DAILY
Status: DISCONTINUED | OUTPATIENT
Start: 2018-08-18 | End: 2018-08-19 | Stop reason: HOSPADM

## 2018-08-18 RX ORDER — ONDANSETRON 2 MG/ML
4 INJECTION INTRAMUSCULAR; INTRAVENOUS EVERY 6 HOURS PRN
Status: DISCONTINUED | OUTPATIENT
Start: 2018-08-18 | End: 2018-08-19 | Stop reason: HOSPADM

## 2018-08-18 RX ORDER — ACETAMINOPHEN 325 MG/1
650 TABLET ORAL EVERY 6 HOURS PRN
Status: DISCONTINUED | OUTPATIENT
Start: 2018-08-18 | End: 2018-08-19 | Stop reason: HOSPADM

## 2018-08-18 RX ADMIN — SODIUM CHLORIDE 100 ML/HR: 0.9 INJECTION, SOLUTION INTRAVENOUS at 19:03

## 2018-08-18 NOTE — ED ATTENDING ATTESTATION
IBlayne DO, saw and evaluated the patient  I have discussed the patient with the resident/non-physician practitioner and agree with the resident's/non-physician practitioner's findings, Plan of Care, and MDM as documented in the resident's/non-physician practitioner's note, except where noted  All available labs and Radiology studies were reviewed  At this point I agree with the current assessment done in the Emergency Department  I have conducted an independent evaluation of this patient a history and physical is as follows:      Critical Care Time  CritCare Time    Procedures     80 yr old male fell from standing with chin injury  Exm: difficult  Chin with abrasion / small lacerationi  Denies pain with jaw motion ? Tetanus   + AOB but alert and awake  No extremity injury  Pln: CT head and neck  Possible suture to chin

## 2018-08-18 NOTE — PROGRESS NOTES
Progress Note - Timothy Bustillo 1950, 80 y o  male MRN: [id number]    Unit/Bed#: -01 Encounter: [id number]    Primary Care Provider: Bernardo Williamson MD   Date and time admitted to hospital: 2018  8:56 PM    ASIA (acute kidney injury) [facility])   Assessment & Plan    · Prerenal  · Repeat bmp after IVF - if better, ok for discharge        Fall at home   Assessment & Plan    · Mechanical fall 2/2 acute alcohol intoxication   · May benefit from outpt rehab but may be all due to ETOH        * Alcohol abuse with intoxication ([facility] [address] )   Assessment & Plan    · Last drink- last night  · Not keen to quit  · Willing to listed to options for rehab  · Waiting psych input - consult placed          VTE Pharmacologic Prophylaxis: Heparin  Mechanical: Mechanical VTE prophylaxis in place  Patient Centered Rounds: I have performed bedside rounds with nursing staff today  Discussions with Specialists or Other Care Team Provider:     Education and Discussions with Family / Patient: dtr updated after pt consent    Time Spent for Care: More than 50% of total time spent on counseling and coordination of care as described above  Current Length of Stay: 0 day(s)    Current Patient Status: Inpatient   Certification Statement: The patient will continue to require additional inpatient hospital stay due to as above    Discharge Plan:    Code Status: Level 1 - Full Code      Subjective: no complaints ' I want to go home'    Objective:     Vitals:   Temp (24hrs), Av 3 °F (36 3 °C), Min:96 °F (35 6 °C), Max:98 2 °F (36 8 °C)    HR:  [80-95] 88  Resp:  [18] 18  BP: (134-140)/(78-90) 139/90  SpO2:  [96 %-98 %] 97 %  Body mass index is 21 98 kg/m²  Input and Output Summary (last 24 hours):        Intake/Output Summary (Last 24 hours) at 18 1618  Last data filed at 18 0900   Gross per 24 hour   Intake             1000 ml   Output             1350 ml   Net             -350 ml       Physical Exam   HENT:   Head: Normocephalic  Eyes: Pupils are equal, round, and reactive to light  Cardiovascular: Normal heart sounds  Pulmonary/Chest: Effort normal    Abdominal: Soft  Neurological: He is alert  Skin: There is pallor  Additional Data:     Labs:      Results from last 7 days  Lab Units 08/17/18  2252   WBC Thousand/uL 8 43   HEMOGLOBIN g/dL 11 0*   HEMATOCRIT % 34 4*   PLATELETS Thousands/uL 259   NEUTROS PCT % 42*   LYMPHS PCT % 50*   MONOS PCT % 6   EOS PCT % 1       Results from last 7 days  Lab Units 08/17/18  2114   SODIUM mmol/L 137   POTASSIUM mmol/L 3 8   CHLORIDE mmol/L 103   CO2 mmol/L 17*   BUN mg/dL 25   CREATININE mg/dL 2 07*   CALCIUM mg/dL 8 8   TOTAL PROTEIN g/dL 7 7   BILIRUBIN TOTAL mg/dL 0 31   ALK PHOS U/L 67   ALT U/L 31   AST U/L 49*   GLUCOSE RANDOM mg/dL 80           * I Have Reviewed All Lab Data Listed Above  Imaging:  Imaging Personally Reviewed by Myself Includes:     Cultures:   Blood Culture: No results found for: BLOODCX  Urine Culture: No results found for: URINECX  Sputum Culture: No components found for: SPUTUMCX  Wound Culture: No results found for: WOUNDCULT    Last 24 Hours Medication List:     Current Facility-Administered Medications:  acetaminophen 650 mg Oral Q6H PRN Sally E Held, PA-C   folic acid 1 mg Oral Daily Sally E Held, PA-C   heparin (porcine) 5,000 Units Subcutaneous Q8H [facility] HOME Trey Linares MD   LORazepam 1 mg Intravenous Q4H PRN Sally E Held, PA-C   methocarbamol 500 mg Oral TID Sally E Held, PA-C   multivitamin-minerals 1 tablet Oral Daily Sally E Held, PA-C   ondansetron 4 mg Intravenous Q6H PRN Sally E Held, PA-C   sodium chloride 100 mL/hr Intravenous Continuous Sally E Held, PA-C   thiamine 100 mg Oral Daily Sally E Held, PA-C        Today, Patient Was Seen By: Vasiliy Strong MD    ** Please Note: Dragon 360 Dictation voice to text software may have been used in the creation of this document   **

## 2018-08-18 NOTE — ASSESSMENT & PLAN NOTE
· Patient intoxicated at the time of my exam  History is unreliable, unable to determine frequency or quantity of typical ETOH consumption     · Ethanol level 381 on admission, recheck in am  · IVF @ 125/hr   · Check am BMP, Mg, Phos   · Folic acid and thiamine daily   · Ativan prn for withdrawal symptoms

## 2018-08-18 NOTE — ASSESSMENT & PLAN NOTE
· Mechanical fall 2/2 acute alcohol intoxication   · May benefit from outpt rehab but may be all due to ETOH

## 2018-08-18 NOTE — ASSESSMENT & PLAN NOTE
· Mechanical fall 2/2 acute alcohol intoxication   · Unknown head trauma or LOC   · CT head negative for acute intracranial abnormality   · CT cervical spine negative for fracture or malalignment   Emphysematous changes with likely right apical bleb     · Superficial chin laceration, stable with steri-strips and gauze

## 2018-08-18 NOTE — SOCIAL WORK
CM met with pt to discuss the role of CM  Pt lives with his daughter in a 2 story home which has 0STE  Pt considers himself independent PTA  Pt owns no DME or living will  Pt reports no hx of mental health, though Psych is consulted  Pt reports using alcohol (beer) "only on Friday"  CM offered HOST but pt refused  Pt states he will take the bus home upon d/c  Pt has no specific pharmacy  CM will continue to follow for needs   Psych will need to evaluate pt prior to d/c

## 2018-08-18 NOTE — ED PROVIDER NOTES
History  Chief Complaint   Patient presents with   [place] Fall     pt presents to ED via EMS with c/o chronic alcohol use per daughter and "fell in the alley by his house"  unknown LOC or head injury  pt presents to ED with chin lac, no other active deformity noted   Alcohol Intoxication     This is a 80year old male with history of chronic alcoholism presenting to the emergency department for evaluation of a fall  Per the daughter who called EMS he had an unwitnessed fall in an alleyway behind his house after drink alcohol  He struck his she had a ground sustained a laceration  He is still very intoxicated and self this unable to provide any further history  Is not on a fall has no other complaints at this point  Prior to Admission Medications   Prescriptions Last Dose Informant Patient Reported? Taking? methocarbamol (ROBAXIN) 500 mg tablet   No No   Sig: Take 1 tablet (500 mg total) by mouth 3 (three) times a day   olopatadine (PATANOL) 0 1 % ophthalmic solution   No No   Sig: Administer 1 drop to both eyes 2 (two) times a day   oxaprozin (DAYPRO) 600 MG tablet   No No   Sig: Take 2 tablets (1,200 mg total) by mouth daily      Facility-Administered Medications: None       History reviewed  No pertinent past medical history  History reviewed  No pertinent surgical history  Family History   Problem Relation Age of Onset    Hypertension Mother     Stroke Mother      I have reviewed and agree with the history as documented      Social History   Substance Use Topics    Smoking status: Never Smoker    Smokeless tobacco: Never Used      Comment: Former smoker /quit 2010 as per Allscripts    Alcohol use 2 4 oz/week     4 Cans of beer per week      Comment: on weekends        Review of Systems   Unable to perform ROS: Mental status change       Physical Exam  ED Triage Vitals   Temperature Pulse Respirations Blood Pressure SpO2   08/17/18 2107 08/17/18 2105 08/17/18 2105 08/17/18 2105 08/17/18 2105   (!) 96 °F (35 6 °C) 80 18 134/78 98 %      Temp Source Heart Rate Source Patient Position - Orthostatic VS BP Location FiO2 (%)   08/17/18 2107 08/17/18 2105 08/17/18 2105 08/17/18 2105 --   Oral Monitor Lying Left arm       Pain Score       08/18/18 0008       No Pain           Orthostatic Vital Signs  Vitals:    08/18/18 2300 08/19/18 0400 08/19/18 0730 08/19/18 0900   BP: 155/90 148/78 155/98 140/88   Pulse: 76 75 88 90   Patient Position - Orthostatic VS: Lying  Lying        Physical Exam   Constitutional: He is oriented to person, place, and time  He appears well-developed  No distress  The patient is obviously intoxicated  He is disoriented though cooperative and pleasant on exam    HENT:   Head: Normocephalic  Mouth/Throat: Oropharynx is clear and moist    Eyes: EOM are normal  Pupils are equal, round, and reactive to light  Neck: No JVD present  No tracheal deviation present  Cardiovascular: Normal rate, regular rhythm, normal heart sounds and intact distal pulses  Exam reveals no gallop and no friction rub  No murmur heard  Pulmonary/Chest: Effort normal and breath sounds normal  No respiratory distress  He has no wheezes  He has no rales  Abdominal: Soft  Bowel sounds are normal  He exhibits no distension  There is no tenderness  There is no rebound and no guarding  Neurological: He is alert and oriented to person, place, and time  No cranial nerve deficit  He exhibits normal muscle tone  Skin: Skin is warm and dry  He is not diaphoretic  No pallor  Psychiatric: He has a normal mood and affect  His behavior is normal    Nursing note and vitals reviewed        ED Medications  Medications   tetanus-diphtheria-acellular pertussis (BOOSTRIX) IM injection 0 5 mL (0 5 mL Intramuscular Given 8/17/18 2207)   lidocaine (PF) (XYLOCAINE-MPF) 1 % injection 5 mL (5 mL Infiltration Given by Other 8/17/18 2306)   sodium chloride 0 9 % bolus 1,000 mL (0 mL Intravenous Stopped 8/18/18 0050) Diagnostic Studies  Results Reviewed     Procedure Component Value Units Date/Time    Comprehensive metabolic panel [[id number]]  (Abnormal) Collected:  08/17/18 2114    Lab Status:  Final result Specimen:  Blood from Arm, Right Updated:  08/17/18 2337     Sodium 137 mmol/L      Potassium 3 8 mmol/L      Chloride 103 mmol/L      CO2 17 (L) mmol/L      Anion Gap 17 (H) mmol/L      BUN 25 mg/dL      Creatinine 2 07 (H) mg/dL      Glucose 80 mg/dL      Calcium 8 8 mg/dL      AST 49 (H) U/L      ALT 31 U/L      Alkaline Phosphatase 67 U/L      Total Protein 7 7 g/dL      Albumin 3 9 g/dL      Total Bilirubin 0 31 mg/dL      eGFR 37 ml/min/1 73sq m     Narrative:         National Kidney Disease Education Program recommendations are as follows:  GFR calculation is accurate only with a steady state creatinine  Chronic Kidney disease less than 60 ml/min/1 73 sq  meters  Kidney failure less than 15 ml/min/1 73 sq  meters      CBC and differential [[id number]]  (Abnormal) Collected:  08/17/18 2252    Lab Status:  Final result Specimen:  Blood from Arm, Left Updated:  08/17/18 2313     WBC 8 43 Thousand/uL      RBC 3 62 (L) Million/uL      Hemoglobin 11 0 (L) g/dL      Hematocrit 34 4 (L) %      MCV 95 fL      MCH 30 4 pg      MCHC 32 0 g/dL      RDW 14 1 %      MPV 10 2 fL      Platelets 348 Thousands/uL      nRBC 0 /100 WBCs      Neutrophils Relative 42 (L) %      Immat GRANS % 0 %      Lymphocytes Relative 50 (H) %      Monocytes Relative 6 %      Eosinophils Relative 1 %      Basophils Relative 1 %      Neutrophils Absolute 3 53 Thousands/µL      Immature Grans Absolute 0 01 Thousand/uL      Lymphocytes Absolute 4 26 Thousands/µL      Monocytes Absolute 0 50 Thousand/µL      Eosinophils Absolute 0 09 Thousand/µL      Basophils Absolute 0 04 Thousands/µL     Ethanol [[id number]]  (Abnormal) Collected:  08/17/18 2114    Lab Status:  Final result Specimen:  Blood from Arm, Right Updated:  08/17/18 2143     Ethanol Lvl 381 (H) mg/dL                  CT spine cervical without contrast   Final Result by Melita Garcia DO (08/17 2220)      No cervical spine fracture or traumatic malalignment  Emphysematous changes with likely right apical bleb  If clinically warranted CT scan of the chest can be obtained for further evaluation             I personally discussed this study with Anh Magaña on 8/17/2018 at 10:08 PM                       Workstation performed: [id number]         CT head without contrast   Final Result by Melita Garcia DO (08/17 2203)      No acute intracranial abnormality  Workstation performed: [id number]               Procedures  Lac Repair  Date/Time: 8/21/2018 4:44 PM  Performed by: Nora Cox  Authorized by: Nora Cox   Body area: head/neck  Location details: chin  Laceration length: 1 cm  Foreign bodies: no foreign bodies  Tendon involvement: none  Nerve involvement: none    Wound Dehiscence:  Superficial Wound Dehiscence: simple closure      Procedure Details:  Irrigation solution: saline  Amount of cleaning: standard  Debridement: none  Degree of undermining: none  Skin closure: Steri-Strips  Technique: simple  Approximation: close  Approximation difficulty: simple  Dressing: 4x4 sterile gauze            Phone Consults  ED Phone Contact    ED Course                               MDM  Number of Diagnoses or Management Options  Chin laceration:   Ethanol causing toxic effect:   Diagnosis management comments: 20-year-old male presentin to emergency department for fall all toxic aided  Will check CT head and C-spine  She denies Serratia repaired as described above will update tetanus check ethanol level and monitor    Patient may require inpatient admission for metabolism of ethanol    CritCare Time    Disposition  Final diagnoses:   Ethanol causing toxic effect   Chin laceration     Time reflects when diagnosis was documented in both MDM as applicable and the Disposition within this note     Time User Action Codes Description Comment    8/17/2018 10:51 PM Octavio Kelly Add [T51 0X1A] Ethanol causing toxic effect     8/17/2018 10:51 PM Octavio Kelly Add [S01 81XA] Chin laceration     8/18/2018  9:26 AM Colleen Linares Add [F10 129] Alcohol abuse with intoxication [facility])       ED Disposition     ED Disposition Condition Comment    Admit  Case was discussed with MIKI and the patient's admission status was agreed to be Admission Status: observation status to the service of Dr Jared Garrett   Follow-up Information     Follow up With Specialties Details Why Contact Info    Ifrah Holguin MD Family Medicine Follow up  [address]  [phone]            Discharge Medication List as of 8/19/2018 12:46 PM      START taking these medications    Details   folic acid (FOLVITE) 1 mg tablet Take 1 tablet (1 mg total) by mouth daily, Starting Mon 8/20/2018, Print      thiamine 100 MG tablet Take 1 tablet (100 mg total) by mouth daily, Starting Mon 8/20/2018, Print         CONTINUE these medications which have NOT CHANGED    Details   methocarbamol (ROBAXIN) 500 mg tablet Take 1 tablet (500 mg total) by mouth 3 (three) times a day, Starting Thu 8/16/2018, Normal      olopatadine (PATANOL) 0 1 % ophthalmic solution Administer 1 drop to both eyes 2 (two) times a day, Starting Thu 8/16/2018, Normal      oxaprozin (DAYPRO) 600 MG tablet Take 2 tablets (1,200 mg total) by mouth daily, Starting Thu 8/16/2018, Normal             Outpatient Discharge Orders  Discharge Diet     Discharge Diet     Activity as tolerated     Activity as tolerated         ED Provider  Attending physically available and evaluated Linda Benedictocary  ROCKY managed the patient along with the ED Attending      Electronically Signed by         Joseph Fritz MD  08/21/18 4702       Joseph Fritz MD  08/21/18 7756

## 2018-08-18 NOTE — CASE MANAGEMENT
Initial Clinical Review  PRESENTED TO ER on  8/17  @  2056    ADMITTED OBS on  8/18  @  0928    CHANGED to INPT Status on  8/18  @  4093  Due to ASIA   Certification I certify that inpatient services are medically necessary for this patient for a duration of greater than two midnights  See H&P and MD Progress Notes for additional information about the patient's course of treatment  ED: Date/Time/Mode of Arrival:   ED Arrival Information     Expected Arrival Acuity Means of Arrival Escorted By Service Admission Type    - 8/17/2018 20:56 Urgent Ambulance [id number] Urgent    [facility]    [facility]          Chief Complaint:   Chief Complaint   Patient presents with   [place] Fall     pt presents to ED via EMS with c/o chronic alcohol use per daughter and "fell in the alley by his house"  unknown LOC or head injury  pt presents to ED with chin lac, no other active deformity noted   Alcohol Intoxication       History of Illness:  80 y o  male who presents with acute alcohol intoxication and chin laceration 2/2 mechanical fall  Patient intoxicated at the time of my exam  He as a thick [place] accent and slurred speech  Unreliable historian  Per record review, patient presented to ED vis EMS after a fall  At that time, daughter reported that patient is chronic alcoholic   He has a superficial chin laceration, currently stable with steri strips and gauze      ED Vital Signs:   GCS 12   ED Triage Vitals   Temperature Pulse Respirations Blood Pressure SpO2   08/17/18 2107 08/17/18 2105 08/17/18 2105 08/17/18 2105 08/17/18 2105   (!) 96 °F (35 6 °C) 80 18 134/78 98 %      Temp Source Heart Rate Source Patient Position - Orthostatic VS BP Location FiO2 (%)   08/17/18 2107 08/17/18 2105 08/17/18 2105 08/17/18 2105 --   Oral Monitor Lying Left arm       Pain Score       08/18/18 0008       No Pain        Wt Readings from Last 1 Encounters:   08/18/18 67 5 kg (148 lb 13 oz)     Abnormal Labs/Diagnostic Test Results:   hgb  11 0  hct  34 4  co2 =  17  Bun  25  crt  2 07  Blood alcohol  381    CT spine =    No cervical spine fracture or traumatic malalignment        Emphysematous changes with likely right apical bleb  If clinically warranted CT scan of the chest can be obtained for further evaluation       ED Treatment:   Medication Administration from 08/17/2018 2056 to 08/17/2018 2346       Date/Time Order Dose Route Action Action by Comments     08/17/2018 2207 tetanus-diphtheria-acellular pertussis (BOOSTRIX) IM injection 0 5 mL 0 5 mL Intramuscular Given Dee Proctor RN                 08/17/2018 230 sodium chloride 0 9 % bolus 1,000 mL 1,000 mL Intravenous New Bag Dee Proctor RN           Past Medical/Surgical History:  Anemia, lo back pain    Admitting Diagnosis: ETOH abuse [F10 10]  Ethanol causing toxic effect [T51 0X1A]  Chin laceration [S01 81XA]    Assessment/Plan:  Alcohol abuse with intoxication [facility])   Assessment & Plan     · Patient intoxicated at the time of my exam  History is unreliable, unable to determine frequency or quantity of typical ETOH consumption  · Ethanol level 381 on admission, recheck in am  · IVF @ 125/hr   · Check am BMP, Mg, Phos   · Folic acid and thiamine daily   · Ativan prn for withdrawal symptoms           Fall at home   Assessment & Plan     · Mechanical fall 2/2 acute alcohol intoxication   · Unknown head trauma or LOC   · CT head negative for acute intracranial abnormality   · CT cervical spine negative for fracture or malalignment   Emphysematous changes with likely right apical bleb     · Superficial chin laceration, stable with steri-strips and gauze           ASIA (acute kidney injury) ([facility] [address] )   Assessment & Plan     · Creatinine 2 07 on admission   · IVF @ 125/hr   · Recheck am BMP             VTE Prophylaxis: low risk  / reason for no mechanical VTE prophylaxis ambulate patient        Patient will be admitted on an Observation basis with an anticipated length of stay of  < 2 midnights     Justification for Hospital Stay: acute alcohol intoxication       Admission Orders:  [address] with continuous pulse oximetry  Consult psych  IVF @ 100  ciwa  Protocol  Ambulate q shift  Reg diet  IVF @ 100    Scheduled Meds:   Current Facility-Administered Medications:  acetaminophen 650 mg Oral Q6H PRN Sally E Held, PA-C   folic acid 1 mg Oral Daily Sally E Held, PA-C   LORazepam 1 mg Intravenous Q4H PRN Sally E Held, PA-C   methocarbamol 500 mg Oral TID Sally E Held, PA-C   multivitamin-minerals 1 tablet Oral Daily Sally E Held, PA-C   ondansetron 4 mg Intravenous Q6H PRN Sally E Held, PA-C   sodium chloride 100 mL/hr Intravenous Continuous Sally E Held, PA-C   thiamine 100 mg Oral Daily Sally E Held, PA-C       8/18/2018  98 2   88   18    139/90

## 2018-08-18 NOTE — H&P
H&P- Betito Andrea 1950, 80 y o  male MRN: [id number]  Unit/Bed#: -01 Encounter: [id number]  Primary Care Provider: Ozzie Martines MD   Date and time admitted to hospital: 8/17/2018  8:56 PM    * Alcohol abuse with intoxication [facility])   Assessment & Plan    · Patient intoxicated at the time of my exam  History is unreliable, unable to determine frequency or quantity of typical ETOH consumption  · Ethanol level 381 on admission, recheck in am  · IVF @ 125/hr   · Check am BMP, Mg, Phos   · Folic acid and thiamine daily   · Ativan prn for withdrawal symptoms         Fall at home   Assessment & Plan    · Mechanical fall 2/2 acute alcohol intoxication   · Unknown head trauma or LOC   · CT head negative for acute intracranial abnormality   · CT cervical spine negative for fracture or malalignment  Emphysematous changes with likely right apical bleb     · Superficial chin laceration, stable with steri-strips and gauze         ASIA (acute kidney injury) ([facility] [address] )   Assessment & Plan    · Creatinine 2 07 on admission   · IVF @ 125/hr   · Recheck am BMP          VTE Prophylaxis: low risk  / reason for no mechanical VTE prophylaxis ambulate patient    Code Status: full code   POLST: There is no POLST form on file for this patient (pre-hospital)    Anticipated Length of Stay:  Patient will be admitted on an Observation basis with an anticipated length of stay of  < 2 midnights  Justification for Hospital Stay: acute alcohol intoxication     Total Time for Visit, including Counseling / Coordination of Care: 30 minutes  Greater than 50% of this total time spent on direct patient counseling and coordination of care  Chief Complaint:   none    History of Present Illness:    Betito Andrea is a 80 y o  male who presents with acute alcohol intoxication and chin laceration 2/2 mechanical fall  Patient intoxicated at the time of my exam  He as a thick [place] accent and slurred speech  Unreliable historian   Per record review, patient presented to ED vis EMS after a fall  At that time, daughter reported that patient is chronic alcoholic  He has a superficial chin laceration, currently stable with steri strips and gauze  Review of Systems:    Review of Systems   Unable to perform ROS: Other   Patient intoxicated at the time of my exam     Past Medical and Surgical History:     History reviewed  No pertinent past medical history  History reviewed  No pertinent surgical history  Meds/Allergies:    Prior to Admission medications    Medication Sig Start Date End Date Taking? Authorizing Provider   methocarbamol (ROBAXIN) 500 mg tablet Take 1 tablet (500 mg total) by mouth 3 (three) times a day 8/16/18   Greer Giang MD   olopatadine (PATANOL) 0 1 % ophthalmic solution Administer 1 drop to both eyes 2 (two) times a day 8/16/18   Greer Giang MD   oxaprozin (DAYPRO) 600 MG tablet Take 2 tablets (1,200 mg total) by mouth daily 8/16/18   Greer Giang MD     I have reviewed home medications with patient personally      Allergies: No Known Allergies    Social History:     Marital Status: Single   Occupation:   Patient Pre-hospital Living Situation:   Patient Pre-hospital Level of Mobility:   Patient Pre-hospital Diet Restrictions:   Substance Use History:   History   Alcohol Use    2 4 oz/week    4 Cans of beer per week     Comment: on weekends     History   Smoking Status    Never Smoker   Smokeless Tobacco    Never Used     Comment: Former smoker /quit 2010 as per Allscripts     History   Drug Use No       Family History:    non-contributory    Physical Exam:     Vitals:   Blood Pressure: 140/78 (08/18/18 0008)  Pulse: 95 (08/18/18 0008)  Temperature: 97 6 °F (36 4 °C) (08/18/18 0008)  Temp Source: Oral (08/18/18 0008)  Respirations: 18 (08/18/18 0008)  Height: 5' 9" (175 3 cm) (08/18/18 0008)  Weight - Scale: 67 5 kg (148 lb 13 oz) (08/18/18 0008)  SpO2: 96 % (08/18/18 0100)    Physical Exam   Constitutional: He appears well-developed and well-nourished  No distress  Cardiovascular: Regular rhythm, normal heart sounds and intact distal pulses  Tachycardia present  Pulmonary/Chest: Effort normal and breath sounds normal  No respiratory distress  He has no wheezes  He has no rales  Abdominal: Soft  Bowel sounds are normal  He exhibits no distension  There is no tenderness  There is no rebound and no guarding  Musculoskeletal: He exhibits no edema  Neurological: He is alert  Oriented only to self    Skin: Laceration (Superficial chin lac stbale with steri strips and gauze ) noted  Additional Data:     Lab Results: I have personally reviewed pertinent reports  Results from last 7 days  Lab Units 08/17/18  2252   WBC Thousand/uL 8 43   HEMOGLOBIN g/dL 11 0*   HEMATOCRIT % 34 4*   PLATELETS Thousands/uL 259   NEUTROS PCT % 42*   LYMPHS PCT % 50*   MONOS PCT % 6   EOS PCT % 1       Results from last 7 days  Lab Units 08/17/18  2114   SODIUM mmol/L 137   POTASSIUM mmol/L 3 8   CHLORIDE mmol/L 103   CO2 mmol/L 17*   BUN mg/dL 25   CREATININE mg/dL 2 07*   CALCIUM mg/dL 8 8   TOTAL PROTEIN g/dL 7 7   BILIRUBIN TOTAL mg/dL 0 31   ALK PHOS U/L 67   ALT U/L 31   AST U/L 49*   GLUCOSE RANDOM mg/dL 80                   Imaging: I have personally reviewed pertinent reports  CT spine cervical without contrast   Final Result by Ev Ryan DO (08/17 2220)      No cervical spine fracture or traumatic malalignment  Emphysematous changes with likely right apical bleb  If clinically warranted CT scan of the chest can be obtained for further evaluation             I personally discussed this study with Shawna Brock on 8/17/2018 at 10:08 PM                       Workstation performed: [id number]         CT head without contrast   Final Result by Ev Ryan DO (08/17 2203)      No acute intracranial abnormality                    Workstation performed: [id number]             EKG, Pathology, and Other Studies Reviewed on Admission:   · EKG: n/a    Allscripts / Epic Records Reviewed: Yes     ** Please Note: This note has been constructed using a voice recognition system   **

## 2018-08-18 NOTE — ASSESSMENT & PLAN NOTE
· Last drink- last night  · Not keen to quit  · Willing to listed to options for rehab  · Waiting psych input - consult placed

## 2018-08-18 NOTE — ED NOTES
Pt presents to ED via EMS with c/o fall outside in alley by home per daughter  Per EMS, daughter stated that pt is a chronic alcoholic  Pt arrives to ED in soaking wet clothes  Assisted in gown  Appears to be speaking foreign language and/or word salad  Unable to hold accurate conversation or answer questions appropriately with writer at this time  Unknown head injury or LOC  Pt appears to have 1cm superficial lac to chin  Small amount of active bleeding noted  Strength adequate in all other extremities  No other obvious deformity noted  resp even non-labored  No acute distress noted  Comfort and safety maintained  Will continue to monitor        Brenna Lara RN  08/17/18 9508

## 2018-08-18 NOTE — CONSULTS
Psychiatric Evaluation - Behavioral Health       Assessment/Plan  Principal Problem:  F 10 20 alcohol use disorder moderate  PLAN:   1) patient would benefit from inpatient rehab  However patient is not very receptive of the idea he said he does not drink that much  2) continue treating patient for alcohol withdrawal         Chief Complaint: "I was admitted as a fell   "    History of Present Illness: This is a 56[facility] who presented with dual at a low acute alcohol intoxication and chin laceration  [facility] [place] and able to speak in [place] however this writer offered him to use interpret a shin devised  Patient refused that and said he can understand [place]  Patient says that he has been drinking beer to a three a day  He said he does not think that he has problem with drinking he just fell and that is why he was admitted  This writer had a discussion with him regarding inpatient rehab however he said that he does not want treatment  He said he lives with his father his wife  two years ago and he is doing well  Denied any hallucinations delusions denied any suicidal homicidal idea he denied any depression also he did not cooperate with mini-mental examination  Because of the language barrier this writer is not sure if patient completely understands what this writer was talking about however this writer repeatedly offer him to use interpretation device but he refused  PAST PSYCH HISTORY:    Patient is denying any previous history of psychiatric problems  Substance Abuse History:    Omayra Dao that he has been drinking for 40 years but has never had any problem he denied being in any rehab facility in the past       Family Psychiatric History:   Denied any family history of mental illness or drug or alcohol use      Social History:  Social History     Social History    Marital status: Single     Spouse name: N/A    Number of children: 1    Years of education: N/A Occupational History    Not on file  Social History Main Topics    Smoking status: Never Smoker    Smokeless tobacco: Never Used      Comment: Former smoker /quit 2010 as per [employer]    Alcohol use 2 4 oz/week     4 Cans of beer per week      Comment: on weekends    Drug use: No    Sexual activity: Not on file     Other Topics Concern    Not on file     Social History Narrative    Advance directive info unavailable    Seperated       Traumatic History:   Abuse: None  Other Traumatic Events: None  History reviewed  No pertinent past medical history  Medical Review Of Systems:  All 12 point review of system is normal except for what is mention in medical hisotry  Scheduled Meds:  Current Facility-Administered Medications:  acetaminophen 650 mg Oral Q6H PRN Sally E Held, PA-C    folic acid 1 mg Oral Daily Sally E Held, PA-C    heparin (porcine) 5,000 Units Subcutaneous Q8H [address] Diane Pollack MD    LORazepam 1 mg Intravenous Q4H PRN Sally E Held, PA-C    methocarbamol 500 mg Oral TID Sally E Held, PA-C    multivitamin-minerals 1 tablet Oral Daily Sally E Held, PA-C    ondansetron 4 mg Intravenous Q6H PRN Sally E Held, PA-C    sodium chloride 100 mL/hr Intravenous Continuous Sally E Held, PA-C Last Rate: 100 mL/hr (08/18/18 1903)   thiamine 100 mg Oral Daily Sally E Held, PA-C      Continuous Infusions:  sodium chloride 100 mL/hr Last Rate: 100 mL/hr (08/18/18 1903)     PRN Meds:   acetaminophen    LORazepam    ondansetron     No Known Allergies          Mental Status Evaluation:  Appearance:  Patient appeared of his age had a bandage on his chin  Behavior:  He was cooperative with the interview process however he was guarded and withdrawn  Speech:  He has thick [place] accent speech was spontaneous goal-directed this writer had to use simple small sentences patient was able to answer most of the questions  Mood:  His reported mood is good     Affect Affect was quite and withdrawn denied being sad  Language: naming objects and Goal directed  Thought Process:   logical and linear    Thought Content:  Denies any paranoia or delusion  Perceptual Disturbances:  denying any auditory and visual hallucinations  Risk Potential: Denying suicidal homicidal ideas  Sensorium:  person, place, time/date, situation, day of week, month of year and year   Cognition:  Cognition is clear he was unable to recall recent things however he is able to recall most of the things from his past   There is some language barrier that doubt also came into play  Consciousness:   alert, awake, and oriented ×3    Attention: Good attention span   Intellect: Normal   Fund of Knowledge: awareness of current events: normal    Insight:  Poor insight   Judgment: Poor judgment   Muscle Strength and Tone: Normal    Gait/Station:  gait was not assessed    Motor Activity: no abnormal movements     Lab Results: I have personally reviewed pertinent lab results  NOTE:  Total of 40 minutes were spent in talking to patient completing this medical record reviewing medical chart medical decision making    Manolo Haynes MD

## 2018-08-19 VITALS
BODY MASS INDEX: 22.04 KG/M2 | SYSTOLIC BLOOD PRESSURE: 140 MMHG | OXYGEN SATURATION: 96 % | HEIGHT: 69 IN | WEIGHT: 148.81 LBS | DIASTOLIC BLOOD PRESSURE: 88 MMHG | RESPIRATION RATE: 18 BRPM | HEART RATE: 90 BPM | TEMPERATURE: 98.5 F

## 2018-08-19 PROBLEM — N17.9 AKI (ACUTE KIDNEY INJURY) (HCC): Status: RESOLVED | Noted: 2018-08-18 | Resolved: 2018-08-19

## 2018-08-19 PROBLEM — F10.129 ALCOHOL ABUSE WITH INTOXICATION (HCC): Status: RESOLVED | Noted: 2018-08-18 | Resolved: 2018-08-19

## 2018-08-19 PROCEDURE — 94760 N-INVAS EAR/PLS OXIMETRY 1: CPT

## 2018-08-19 PROCEDURE — 99239 HOSP IP/OBS DSCHRG MGMT >30: CPT | Performed by: INTERNAL MEDICINE

## 2018-08-19 RX ORDER — LANOLIN ALCOHOL/MO/W.PET/CERES
100 CREAM (GRAM) TOPICAL DAILY
Qty: 30 TABLET | Refills: 0 | Status: SHIPPED | OUTPATIENT
Start: 2018-08-20 | End: 2018-08-29 | Stop reason: SDUPTHER

## 2018-08-19 RX ORDER — FOLIC ACID 1 MG/1
1 TABLET ORAL DAILY
Qty: 30 TABLET | Refills: 0 | Status: SHIPPED | OUTPATIENT
Start: 2018-08-20 | End: 2018-08-29 | Stop reason: SDUPTHER

## 2018-08-19 RX ADMIN — HEPARIN SODIUM 5000 UNITS: 5000 INJECTION, SOLUTION INTRAVENOUS; SUBCUTANEOUS at 05:23

## 2018-08-19 RX ADMIN — METHOCARBAMOL 500 MG: 500 TABLET ORAL at 08:08

## 2018-08-19 RX ADMIN — SODIUM CHLORIDE 100 ML/HR: 0.9 INJECTION, SOLUTION INTRAVENOUS at 00:20

## 2018-08-19 RX ADMIN — Medication 100 MG: at 08:08

## 2018-08-19 RX ADMIN — Medication 1 TABLET: at 08:08

## 2018-08-19 RX ADMIN — FOLIC ACID 1 MG: 1 TABLET ORAL at 08:08

## 2018-08-19 NOTE — SOCIAL WORK
Cm reviewed patient during care coordination rounds  Patient is medically stable for d/c today  Cm confirmed that patient will be able to d/c home  Cm arranged for taxi for patient to d/c home to  Patient confirmed that he would have a key for the home  Cm contacted patient's daughter/ emergency contact and informed her about patient's discharge  Cm provided information for drug and alcohol treatment to patient at time of d/c  No other needs identified at this time

## 2018-08-19 NOTE — NURSING NOTE
Pt d/c home  Pt VS stable and no c/o pain and no s/s of withdrawn  Pt transport home by taxi ( paper in chart)

## 2018-08-19 NOTE — DISCHARGE SUMMARY
Discharge Summary - [facility] Internal Medicine    Patient Information: Dirk Rodriguez 80 y o  male MRN: [id number]  Unit/Bed#: -01 Encounter: [id number]    Discharging Physician / Practitioner: Pasquale Bee MD  PCP: Aric Williamson MD  Admission Date: 8/17/2018  Discharge Date: 08/19/18    Reason for Admission: intoxicated    Discharge Diagnoses:     Principal Problem (Resolved):    Alcohol abuse with intoxication [facility])  Active Problems:    Fall at home  Resolved Problems:    ASIA (acute kidney injury) [facility])      Consultations During Hospital Stay:  · Dr Victor Manuel Spain Psychiatry    Procedures Performed:     · CT head - nil acute      Incidental Findings:   · none    Test Results Pending at Discharge (will require follow up):   · none     Outpatient Tests Requested:  · none    Complications: none    Hospital Course:     Dirk Rodriguez is a 80 y o  male patient who originally presented to the hospital on 8/17/2018 due to acute alcohol intoxication  He is reported by dtr to be alcoholic but pt denied this and has no intention to quit  Seen by psychiatrist inpatient  Also had ASIA on presentation, prerenal and responded to IVF with return to baseline creatinine  Stable to discharge  Condition at Discharge: fair     Discharge Day Visit / Exam:     Vitals: Blood Pressure: 140/88 (08/19/18 0900)  Pulse: 90 (08/19/18 0900)  Temperature: 98 5 °F (36 9 °C) (08/19/18 0730)  Temp Source: Oral (08/19/18 0730)  Respirations: 18 (08/19/18 0730)  Height: 5' 9" (175 3 cm) (08/18/18 0008)  Weight - Scale: 67 5 kg (148 lb 13 oz) (08/18/18 0008)  SpO2: 96 % (08/19/18 0730)  Exam:   Physical Exam   HENT:   Head: Normocephalic  Eyes: Pupils are equal, round, and reactive to light  Cardiovascular: Normal heart sounds  Pulmonary/Chest: Effort normal    Abdominal: Soft  Neurological: He is alert  Skin: Skin is warm         Discharge instructions/Information to patient and family:   See after visit summary for information provided to patient and family  Provisions for Follow-Up Care:  See after visit summary for information related to follow-up care and any pertinent home health orders  Disposition: Home    Discharge Statement:  I spent 35 minutes discharging the patient  This time was spent on the day of discharge  I had direct contact with the patient on the day of discharge  Greater than 50% of the total time was spent examining patient, answering all patient questions, arranging and discussing plan of care with patient as well as directly providing post-discharge instructions  Additional time then spent on discharge activities  Discharge Medications:  See after visit summary for reconciled discharge medications provided to patient and family  ** Please Note: Dragon 360 Dictation voice to text software may have been used in the creation of this document   **

## 2018-08-19 NOTE — PHYSICIAN ADVISOR
Current patient class: Inpatient  The patient is currently on Hospital Day: 2 at [facility]      The patient was admitted to the hospital at [phone] on 8/18/18 for the following diagnosis:  ETOH abuse [F10 10]  Ethanol causing toxic effect [T51 0X1A]  Chin laceration [S01 81XA]       There is documentation in the medical record of an expected length of stay of at least 2 midnights  The patient is therefore expected to satisfy the 2 midnight benchmark and given the 2 midnight presumption is appropriate for INPATIENT ADMISSION  Given this expectation of a satisfying stay, CMS instructs us that the patient is most often appropriate for inpatient admission under part A provided medical necessity is documented in the chart  After review of the relevant documentation, labs, vital signs and test results, the patient is appropriate for INPATIENT ADMISSION  Admission to the hospital as an inpatient is a complex decision making process which requires the practitioner to consider the patients presenting complaint, history and physical examination and all relevant testing  With this in mind, in this case, the patient was deemed appropriate for INPATIENT ADMISSION  After review of the documentation and testing available at the time of the admission I concur with this clinical determination of medical necessity  Rationale is as follows: The patient is a 80 yrs old Male who presented to the ED at 8/17/2018  8:56 PM with a chief complaint of Fall (pt presents to ED via EMS with c/o chronic alcohol use per daughter and "fell in the alley [place] his [place]"  unknown LOC or head injury  pt presents to ED with chin lac, no other active deformity noted  ) and Alcohol Intoxication     Given the need for further hospitalization, and along with the documentation of medical necessity present in the chart, the patient is appropriate for inpatient admission    The patient is expected to satisfy the 2 midnight benchmark, and will require further acute medical care  The patient does have comorbid conditions which increases the risk for significant adverse outcome  Given this the patient is appropriate for inpatient admission  The patients vitals on arrival were ED Triage Vitals   Temperature Pulse Respirations Blood Pressure SpO2   08/17/18 2107 08/17/18 2105 08/17/18 2105 08/17/18 2105 08/17/18 2105   (!) 96 °F (35 6 °C) 80 18 134/78 98 %      Temp Source Heart Rate Source Patient Position - Orthostatic VS BP Location FiO2 (%)   08/17/18 2107 08/17/18 2105 08/17/18 2105 08/17/18 2105 --   Oral Monitor Lying Left arm       Pain Score       08/18/18 0008       No Pain           History reviewed  No pertinent past medical history  History reviewed  No pertinent surgical history  Consults have been placed to:   IP CONSULT TO PSYCHIATRY    Vitals:    08/18/18 0724 08/18/18 0757 08/18/18 1514 08/18/18 1900   BP: 139/90   (!) 151/107   BP Location: Left arm   Left arm   Pulse: 88  88 91   Resp: 18 18 18   Temp: 98 2 °F (36 8 °C)   98 6 °F (37 °C)   TempSrc: Oral   Oral   SpO2: 98% 98% 97% 96%   Weight:       Height:           Most recent labs:    Recent Labs      08/17/18 2114 08/17/18   2252  08/18/18   1730   WBC   --    --   8 43   --    HGB   --    --   11 0*   --    HCT   --    --   34 4*   --    PLT   --    < >  259  206   K  3 8   --    --   3 5  3 5   NA  137   --    --   142  143   CALCIUM  8 8   --    --   6 8*  7 0*   BUN  25   --    --   15  15   CREATININE  2 07*   --    --   1 08  1 11   AST  49*   --    --    --    ALT  31   --    --    --    ALKPHOS  67   --    --    --    BILITOT  0 31   --    --    --     < > = values in this interval not displayed         Scheduled Meds:  Current Facility-Administered Medications:  acetaminophen 650 mg Oral Q6H PRN Sally CORA Torres PA-C    folic acid 1 mg Oral Daily Sally Torres PA-C    heparin (porcine) 5,000 Units Subcutaneous [facility] [employer] [facility] Jeannie Keith MD    LORazepam 1 mg Intravenous Q4H PRN Sally E Held, KOREY    methocarbamol 500 mg Oral TID Sally E Held, PA-CLEMENCIA    multivitamin-minerals 1 tablet Oral Daily Sally E Held, PA-C    ondansetron 4 mg Intravenous Q6H PRN Sally E Held, PA-C    sodium chloride 100 mL/hr Intravenous Continuous Sally E Held, PA-C Last Rate: 100 mL/hr (08/18/18 1903)   thiamine 100 mg Oral Daily Sally E Held, KOREY      Continuous Infusions:  sodium chloride 100 mL/hr Last Rate: 100 mL/hr (08/18/18 1903)     PRN Meds:   acetaminophen    LORazepam    ondansetron    Surgical procedures (if appropriate):

## 2018-08-20 ENCOUNTER — TRANSITIONAL CARE MANAGEMENT (OUTPATIENT)
Dept: FAMILY MEDICINE CLINIC | Facility: CLINIC | Age: 68
End: 2018-08-20

## 2018-08-20 NOTE — PROGRESS NOTES
Ángela Santiago, Patient's daughter called back and she said she will ask her father if he wants to come in for an office visit and she will call us back

## 2018-08-27 ENCOUNTER — APPOINTMENT (OUTPATIENT)
Dept: PHYSICAL THERAPY | Facility: CLINIC | Age: 68
End: 2018-08-27
Payer: MEDICARE

## 2018-08-27 ENCOUNTER — EVALUATION (OUTPATIENT)
Dept: PHYSICAL THERAPY | Facility: CLINIC | Age: 68
End: 2018-08-27
Payer: MEDICARE

## 2018-08-27 ENCOUNTER — TELEPHONE (OUTPATIENT)
Dept: FAMILY MEDICINE CLINIC | Facility: CLINIC | Age: 68
End: 2018-08-27

## 2018-08-27 DIAGNOSIS — H10.10 ALLERGIC CONJUNCTIVITIS, UNSPECIFIED LATERALITY: Primary | ICD-10-CM

## 2018-08-27 DIAGNOSIS — G89.29 CHRONIC BILATERAL LOW BACK PAIN WITHOUT SCIATICA: ICD-10-CM

## 2018-08-27 DIAGNOSIS — M54.50 CHRONIC BILATERAL LOW BACK PAIN WITHOUT SCIATICA: ICD-10-CM

## 2018-08-27 DIAGNOSIS — M54.50 CHRONIC RIGHT-SIDED LOW BACK PAIN WITHOUT SCIATICA: Primary | ICD-10-CM

## 2018-08-27 DIAGNOSIS — G89.29 CHRONIC RIGHT-SIDED LOW BACK PAIN WITHOUT SCIATICA: Primary | ICD-10-CM

## 2018-08-27 PROCEDURE — 97161 PT EVAL LOW COMPLEX 20 MIN: CPT

## 2018-08-27 PROCEDURE — G8990 OTHER PT/OT CURRENT STATUS: HCPCS

## 2018-08-27 PROCEDURE — G8991 OTHER PT/OT GOAL STATUS: HCPCS

## 2018-08-27 RX ORDER — KETOTIFEN FUMARATE 0.35 MG/ML
1 SOLUTION/ DROPS OPHTHALMIC 2 TIMES DAILY
Qty: 5 ML | Refills: 0 | Status: SHIPPED | OUTPATIENT
Start: 2018-08-27

## 2018-08-27 NOTE — PROGRESS NOTES
PT Evaluation     Today's date: 2018  Patient name: Fernando Olmedo  : 1950  MRN: [id number]  Referring provider: Wood Avila MD  Dx:   Encounter Diagnosis     ICD-10-CM    1  Chronic right-sided low back pain without sciatica M54 5 PT plan of care cert/re-cert    E71 30        Start Time: 0920  Stop Time: 1000  Total time in clinic (min): 40 minutes    Assessment  Impairments: abnormal or restricted ROM, impaired physical strength, lacks appropriate home exercise program, pain with function, poor posture  and poor body mechanics    Assessment details: Pt is a 79 y/o male seen for chronic R-sided LBP w/ decreased AROM, decreased HS flexibility, gen deconditioning, BLE ms weakness and report of very limited tolerance to standing/walking  (Pt was hosp last  for acute alcohol intoxication w/ fall at home )  Skilled PT recommended to address impairments and improve function  Understanding of Dx/Px/POC: fair   Prognosis: fair    Goals  STGS (2 weeks): 1  Increase L-T spine ROT AROM to 75% or better; passive SLR to 60 to 65 deg                               2  Indep w/ initial HEP w/ copy                               3  Pt will be educ on PBM and increasing postural awareness  LTGs (4 weeks): 1   Increase prox LE ms strength to by ½ to 1 grade                               2  Demo sit-stand w/o UE use; progress to floor-waist lift w/ good tech when appropriate                               3  Madelin progressive trunk stab ex/functional act  for standing/walking  X 20-30' w/ 2/10 LBP or less per report                               4   Indep w/ HEP update w/ copy      Plan  Patient would benefit from: skilled physical therapy  Planned modality interventions: thermotherapy: hydrocollator packs  Planned therapy interventions: abdominal trunk stabilization, body mechanics training, manual therapy, neuromuscular re-education, patient education, postural training, strengthening, stretching, therapeutic activities, therapeutic exercise, home exercise program, flexibility and functional ROM exercises  Frequency: 2x week  Duration in visits: 12  Duration in weeks: 6  Treatment plan discussed with: patient        Subjective Evaluation    History of Present Illness  Date of onset: 2018  Mechanism of injury: Pt w/ chronic on/off LBP X 25 years 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)  Has been seeing MD for LBP for 5 years and has received PT (w/ some relief) and injection (no relief)  PLOF: Sedentary lifestyle; able to help some with house chores such as doing the dishes and cleaning  Recurrent probem    Pain  Current pain ratin  At best pain ratin  At worst pain ratin  Location: R LBP to R flank  Relieving factors: medications  Aggravating factors: standing  Progression: no change    Social Support  Lives with: adult children (daughter)    Employment status: not working  Exercise history: None          Objective     Postural Observations  Seated posture: poor (Sacral sitting)  Standing posture: fair    Additional Postural Observation Details  Flat back w/ mild scoliosis/trunk SB L, otherwise, unremarkable    Palpation     Right   No palpable tenderness to the lumbar paraspinals and quadratus lumborum  Tenderness     Right Hip   No tenderness in the PSIS  Neurological Testing     Sensation     Lumbar   Left   Intact: light touch    Right   Intact: light touch    Additional Neurological Details  Neuro grossly intact      Active Range of Motion     Additional Active Range of Motion Details  FB ~70%  BB ~50%  SB/ROT ~50% B sides    Passive Range of Motion     Additional Passive Range of Motion Details  Passive SLR: R 55 deg/L 50 deg    Strength/Myotome Testing     Left Hip   Planes of Motion   Flexion: 4  Extension: 3+    Right Hip   Planes of Motion   Flexion: 3+  Extension: 3    Left Knee   Flexion: 5  Extension: 5    Right Knee   Flexion: 5  Extension: 5    Left Ankle/Foot Dorsiflexion: 4    Right Ankle/Foot   Dorsiflexion: 4+    Tests     Additional Tests Details  Functional:Indep amb w/o AD w/ slight trunk SB R;  1-2 UE use w/ sit-stand; SLS: R 10 secs/ L 3-5 secs       Flowsheet Rows      Most Recent Value   PT/OT G-Codes   Current Score  46%   Projected Score  32%   Assessment Type  Evaluation   G code set  Other PT/OT Primary   Other PT Primary Current Status ()  CK   Other PT Primary Goal Status ()  CJ          Precautions: None stated on script; (+) recent fall    Daily Treatment Diary     Manual                                                                                   Exercise Diary  8/27            Supine   LTR   nv            KTC stretch nv            PPT nv            PPT w/ marching   nv            HS stretch nv            Bike nv                                                                                                                                                                                                      Modalities

## 2018-08-27 NOTE — TELEPHONE ENCOUNTER
Pt's eye drops Rx are not covered by ins  She said she got an Rx for eyes for Ketotifin and was wondering if he could have the same because they are covered

## 2018-08-28 ENCOUNTER — OFFICE VISIT (OUTPATIENT)
Dept: PHYSICAL THERAPY | Facility: CLINIC | Age: 68
End: 2018-08-28
Payer: MEDICARE

## 2018-08-28 DIAGNOSIS — G89.29 CHRONIC RIGHT-SIDED LOW BACK PAIN WITHOUT SCIATICA: Primary | ICD-10-CM

## 2018-08-28 DIAGNOSIS — M54.50 CHRONIC RIGHT-SIDED LOW BACK PAIN WITHOUT SCIATICA: Primary | ICD-10-CM

## 2018-08-28 PROCEDURE — 97110 THERAPEUTIC EXERCISES: CPT

## 2018-08-28 NOTE — PROGRESS NOTES
Daily Note     Today's date: 2018  Patient name: Catherine Castillo  : 1950  MRN: [id number]  Referring provider: Ari Rodgers MD  Dx:   Encounter Diagnosis     ICD-10-CM    1  Chronic right-sided low back pain without sciatica M54 5     G89 29                   Subjective: No pain currently  Objective: Noted sacral sitting in the waiting area - Pt corrected and instructed on use of L-roll/firm chair  Bike and exers per  treatment diary below  Initial HEP copy issued  Precautions: None stated on script; (+) recent fall     Daily Treatment Diary      Manual                                                                                                                                                      Exercise Diary                     Supine   LTR    nv 5"  10X2                      KTC stretch nv  10"X10                   PPT nv  10X2                   PPT w/ marching    nv  np                   HS stretch nv  np                   Bike nv  8'                    Supine, BLEs on ball, DF + knee ext    5"  10X2                    Seated   marching    [id number]                    Seated LAQs   Vidya Nida                                                                                                                                                                                                                                                                                                 Modalities                                                                                                          Assessment: Tolerated treatment well - multiple cues/occ assist to demo exers in good form  Pt demo'd understanding HEP - needs reinforcement to ensure correct form  No pain T/O  Patient demonstrated fatigue/mild SOB post treatment and would benefit from continued PT  Plan: Continue per plan of care  Progress as tolerated

## 2018-08-29 ENCOUNTER — OFFICE VISIT (OUTPATIENT)
Dept: FAMILY MEDICINE CLINIC | Facility: CLINIC | Age: 68
End: 2018-08-29
Payer: MEDICARE

## 2018-08-29 VITALS
SYSTOLIC BLOOD PRESSURE: 126 MMHG | BODY MASS INDEX: 23.99 KG/M2 | HEIGHT: 69 IN | TEMPERATURE: 98.9 F | RESPIRATION RATE: 18 BRPM | HEART RATE: 76 BPM | DIASTOLIC BLOOD PRESSURE: 84 MMHG | WEIGHT: 162 LBS

## 2018-08-29 DIAGNOSIS — T51.0X4D: ICD-10-CM

## 2018-08-29 DIAGNOSIS — M25.50 ARTHRALGIA, UNSPECIFIED JOINT: Primary | ICD-10-CM

## 2018-08-29 PROBLEM — F10.10 ALCOHOL ABUSE: Status: ACTIVE | Noted: 2018-08-29

## 2018-08-29 PROCEDURE — 99495 TRANSJ CARE MGMT MOD F2F 14D: CPT | Performed by: FAMILY MEDICINE

## 2018-08-29 RX ORDER — FOLIC ACID 1 MG/1
1 TABLET ORAL DAILY
Qty: 30 TABLET | Refills: 0 | Status: SHIPPED | OUTPATIENT
Start: 2018-08-29

## 2018-08-29 RX ORDER — MELOXICAM 7.5 MG/1
7.5 TABLET ORAL DAILY
Qty: 30 TABLET | Refills: 1 | Status: SHIPPED | OUTPATIENT
Start: 2018-08-29

## 2018-08-29 RX ORDER — LANOLIN ALCOHOL/MO/W.PET/CERES
100 CREAM (GRAM) TOPICAL DAILY
Qty: 30 TABLET | Refills: 0 | Status: SHIPPED | OUTPATIENT
Start: 2018-08-29

## 2018-08-29 NOTE — PROGRESS NOTES
Assessment/Plan:    Alcohol abuse  Needs to get into recovery-rec AA meetings       Diagnoses and all orders for this visit:    Arthralgia, unspecified joint  -     meloxicam (MOBIC) 7 5 mg tablet; Take 1 tablet (7 5 mg total) by mouth daily    Toxic effect of ethanol, undetermined intent, subsequent encounter  -     thiamine 100 MG tablet; Take 1 tablet (100 mg total) by mouth daily  -     folic acid (FOLVITE) 1 mg tablet; Take 1 tablet (1 mg total) by mouth daily  -     Comprehensive metabolic panel; Future          Subjective:      Patient ID: Yassine Valdez is a 80 y o  male    Date and time hospital follow up call was made:  8/21/2018 12:04 PM  Patient was hopsitalized at:  [facility]  Date of admission:  8/17/18  Date of discharge:  8/19/18  Diagnosis:  Alcoholism  Disposition:  Home  Were the patients medicaitons reviewed and updated:  Yes  Current symptoms:  None  Post hospital issues:  None  Should patient be enrolled in anticoag monitoring?:  No  Scheduled for follow up?:  Yes  Did you obtain your prescribed medications:  Yes  Do you need help managing your perscriptions or medications:  No  Is transportation to your appointments needed:  No  I have advised the patient to call PCP with any new or worsening symptoms (please type in name along with any credentials):  Eric Simmons MA  Living Arrangements:  Alone  Support System:  Family, Children  The type of support provided:  Emotional, Physical, Financial  Are you recieving outpatient services:  No  Are you recieving home care services:  No  Are you using any community resources:  No  Current waiver service:  No       F/u alcohol -folic acid and thiamine not received at pharmacy, needs f/u lab        The following portions of the patient's history were reviewed and updated as appropriate: allergies, current medications, past family history, past medical history, past social history, past surgical history and problem list       Review of Systems Constitutional: Negative for activity change, appetite change and unexpected weight change  Respiratory: Negative for shortness of breath  Cardiovascular: Negative for chest pain  Gastrointestinal: Negative for abdominal pain  Neurological: Negative for headaches  Objective:      /84 (BP Location: Right arm, Patient Position: Sitting, Cuff Size: Standard)   Pulse 76   Temp 98 9 °F (37 2 °C) (Temporal)   Resp 18   Ht 5' 9" (1 753 m)   Wt 73 5 kg (162 lb)   BMI 23 92 kg/m²          Physical Exam   Constitutional: He appears well-developed and well-nourished  Neck: No thyromegaly present  Cardiovascular: Normal rate, regular rhythm and normal heart sounds  Pulmonary/Chest: Breath sounds normal    Abdominal: There is no tenderness  Musculoskeletal: He exhibits no edema  Lymphadenopathy:     He has no cervical adenopathy  Vitals reviewed

## 2018-09-04 ENCOUNTER — OFFICE VISIT (OUTPATIENT)
Dept: PHYSICAL THERAPY | Facility: CLINIC | Age: 68
End: 2018-09-04
Payer: MEDICARE

## 2018-09-04 DIAGNOSIS — G89.29 CHRONIC RIGHT-SIDED LOW BACK PAIN WITHOUT SCIATICA: Primary | ICD-10-CM

## 2018-09-04 DIAGNOSIS — M54.50 CHRONIC RIGHT-SIDED LOW BACK PAIN WITHOUT SCIATICA: Primary | ICD-10-CM

## 2018-09-04 PROCEDURE — 97110 THERAPEUTIC EXERCISES: CPT

## 2018-09-04 NOTE — PROGRESS NOTES
Daily Note     Today's date: 2018  Patient name: Carol Thurston  : 1950  MRN: [id number]  Referring provider: Princess Leann MD  Dx:   Encounter Diagnosis     ICD-10-CM    1  Chronic right-sided low back pain without sciatica M54 5     G89 29                   Subjective: No pain currently  Doing exers at home  Objective: Noted again sacral sitting in the waiting area - prompted Pt to correct posture  Bike and exers per  treatment diary below  Precautions: None stated on script; (+) recent fall     Daily Treatment Diary      Manual                                                                                                                                                      Exercise Diary    9                 Supine   LTR    nv 5"  10X2     3' (5")                 KTC stretch nv  10"X10  4' (10")                 PPT nv  10X2 michael abd 10X2                 PPT w/ marching    nv  np  3'                 HS stretch nv  np  seated  10"X10                 Bike nv  8'  8'                  Supine, BLEs on ball, DF + knee ext    5"  10X2  DF 10X2; DF w/ ext 20X w/ assist                  Seated   marching    10X2  10X2  Standing  W/ CS                    Seated LAQs    10X2  np                  Bent over alt LE ext      3' w/ pelvis stabilized                                                                                                                                                                                                                                                                       Modalities                                                                                                          Assessment: Tolerated treatment well  Multiple cues/occ assist to demo exers in good form (poor carryover from last tx session needing re-instruction in most ex)  No pain T/O  Patient demonstrated fatigue/mild SOB post treatment and would benefit from continued PT        Plan: Continue per plan of care  Progress as tolerated

## 2018-09-06 ENCOUNTER — OFFICE VISIT (OUTPATIENT)
Dept: PHYSICAL THERAPY | Facility: CLINIC | Age: 68
End: 2018-09-06
Payer: MEDICARE

## 2018-09-06 DIAGNOSIS — G89.29 CHRONIC RIGHT-SIDED LOW BACK PAIN WITHOUT SCIATICA: Primary | ICD-10-CM

## 2018-09-06 DIAGNOSIS — M54.50 CHRONIC RIGHT-SIDED LOW BACK PAIN WITHOUT SCIATICA: Primary | ICD-10-CM

## 2018-09-06 PROCEDURE — 97112 NEUROMUSCULAR REEDUCATION: CPT

## 2018-09-06 PROCEDURE — 97110 THERAPEUTIC EXERCISES: CPT

## 2018-09-06 NOTE — PROGRESS NOTES
Daily Note     Today's date: 2018  Patient name: Nathalie Young  : 1950  MRN: [id number]  Referring provider: Damri Gallegos MD  Dx:   Encounter Diagnosis     ICD-10-CM    1  Chronic right-sided low back pain without sciatica M54 5     G89 29                   Subjective: No LBP  No problems after last visit  Objective: Noted again (3rd time) sacral sitting in the waiting area - Pt corrected posture after being prompted  Bike and exers per  treatment diary below w/ progression (standing balance ex)        Precautions: None stated on script; (+) recent fall     Daily Treatment Diary      Manual                                                                                                                                                      Exercise Diary                 Supine   LTR    nv 5"  10X2     3' (5")  4' (10")               KTC stretch nv  10"X10  4' (10")  DKTC w/ ball/assist 10"X10               PPT nv  10X2 michael abd 10X2  michael abd 10X2               PPT w/ marching    nv  np  3'  np               HS stretch nv  np  seated  10"X10  stand  4' (10")               Bike nv  8'  8'  8'                Supine, BLEs on ball, DF + knee ext    5"  10X2  DF 10X2; DF w/ ext 20X w/ assist  np                Seated   Marching  ( - alt stool taps      10X2  10X2  Standing  W/ CS    10X2 2# w/ CS                Seated LAQs    10X2  np  np                Bent over alt LE ext      3' w/ pelvis stabilized  np                Adam HR facing plinth, no UE        20X CS                Adam TR, plinth behind, no UE        20X CG/CS                Minisquats        10X2 BUE                                                                                                                                                                                             Modalities                                                                                                          Assessment: Tolerated treatment well  Decreased cues/assist to demo exers in good form  CGA 3X to correct mild LOBs w/ janine TR ex  No pain T/O  Patient demonstrated fatigue requiring seated breaks in between standing balance ex  Pt would benefit from continued PT  Plan: Continue per plan of care  Progress as tolerated

## 2018-09-11 ENCOUNTER — OFFICE VISIT (OUTPATIENT)
Dept: PHYSICAL THERAPY | Facility: CLINIC | Age: 68
End: 2018-09-11
Payer: MEDICARE

## 2018-09-11 DIAGNOSIS — G89.29 CHRONIC RIGHT-SIDED LOW BACK PAIN WITHOUT SCIATICA: Primary | ICD-10-CM

## 2018-09-11 DIAGNOSIS — M54.50 CHRONIC RIGHT-SIDED LOW BACK PAIN WITHOUT SCIATICA: Primary | ICD-10-CM

## 2018-09-11 PROCEDURE — 97110 THERAPEUTIC EXERCISES: CPT | Performed by: PHYSICAL MEDICINE & REHABILITATION

## 2018-09-11 PROCEDURE — 97150 GROUP THERAPEUTIC PROCEDURES: CPT | Performed by: PHYSICAL MEDICINE & REHABILITATION

## 2018-09-11 NOTE — PROGRESS NOTES
Daily Note     Today's date: 2018  Patient name: Derick Ramey  : 1950  MRN: [id number]  Referring provider: Koko Dennis MD  Dx:   Encounter Diagnosis     ICD-10-CM    1  Chronic right-sided low back pain without sciatica M54 5     G89 29        Start Time: 1530  Stop Time: 1610  Total time in clinic (min): 40 minutes    Subjective: Pt states that he currently is not experiencing sxs in his back, he states that the sxs come and go         Objective: See treatment diary below  Precautions: None stated on script; (+) recent fall     Daily Treatment Diary      Manual                                                                                                                                                      Exercise Diary               Supine   LTR    nv 5"  10X2      3' (5")  4' (10")  10" hold x 5 ea             KTC stretch nv  10"X10  4' (10")  DKTC w/ ball/assist 10"X10  SKTC c towel  5" x 10             PPT nv  10X2 michael abd 10X2  michael abd 10X2               PPT w/ marching    nv  np  3'  np               HS stretch nv  np  seated  10"X10  stand  4' (10")  seated 3 x 30" ea             Bike nv  8'  8'  8'                Supine, BLEs on ball, DF + knee ext    5"  10X2  DF 10X2; DF w/ ext 20X w/ assist  np                Seated   Marching  ( - alt stool taps       10X2  10X2  Standing  W/ CS     10X2 2# w/ CS  20 ea              Seated LAQs    10X2  np  np                Bent over alt LE ext      3' w/ pelvis stabilized  np                Adam HR facing plinth, no UE        20X CS  20              Adam TR, plinth behind, no UE        20X CG/CS  20              Minisquats        10X2 BUE  20 ea  BUE                                                                                                                                                                                           Modalities                                                                                                            Assessment: Tolerated treatment fair  Patient demonstrated fatigue post treatment and required verbal, tactile and visual cueing to reduce compensations and to improve performance of TE  Plan: Progress treatment as tolerated

## 2018-09-13 ENCOUNTER — OFFICE VISIT (OUTPATIENT)
Dept: PHYSICAL THERAPY | Facility: CLINIC | Age: 68
End: 2018-09-13
Payer: MEDICARE

## 2018-09-13 DIAGNOSIS — G89.29 CHRONIC RIGHT-SIDED LOW BACK PAIN WITHOUT SCIATICA: Primary | ICD-10-CM

## 2018-09-13 DIAGNOSIS — M54.50 CHRONIC RIGHT-SIDED LOW BACK PAIN WITHOUT SCIATICA: Primary | ICD-10-CM

## 2018-09-13 PROCEDURE — 97110 THERAPEUTIC EXERCISES: CPT | Performed by: PHYSICAL THERAPIST

## 2018-09-13 PROCEDURE — 97150 GROUP THERAPEUTIC PROCEDURES: CPT

## 2018-09-13 NOTE — PROGRESS NOTES
Daily Note     Today's date: 2018  Patient name: Miguel Murphy  : 1950  MRN: [id number]  Referring provider: Clive Balderas MD  Dx:   Encounter Diagnosis     ICD-10-CM    1  Chronic right-sided low back pain without sciatica M54 5     G89 29        Start Time: 1430  Stop Time: 1515  Total time in clinic (min): 45 minutes    Subjective: Pt states he doesn't have any pain today        Objective: See treatment diary below    Precautions: None stated on script; (+) recent fall     Daily Treatment Diary      Manual                                                                                                                                                      Exercise Diary             Supine   LTR    nv 5"  10X2      3' (5")  4' (10")  10" hold x 5 ea  10"x10 ea           KTC stretch nv  10"X10  4' (10")  DKTC w/ ball/assist 10"X10  SKTC c towel  5" x 10  SKTC towel 5"x10           PPT nv  10X2 michael abd 10X2  michael abd 10X2    20x           PPT w/ marching    nv  np  3'  np    20x           HS stretch nv  np  seated  10"X10  stand  4' (10")  seated 3 x 30" ea  Seated 3x30"           Bike nv  8'  8'  8'    8'            Supine, BLEs on ball, DF + knee ext    5"  10X2  DF 10X2; DF w/ ext 20X w/ assist  np    np            Seated   Marching  ( - alt stool taps       10X2  10X2  Standing  W/ CS     10X2 2# w/ CS  20 ea  20ea            Seated LAQs    10X2  np  np    np            Bent over alt LE ext      3' w/ pelvis stabilized  np    np            Adam HR facing plinth, no UE        20X CS  20  20x            Adam TR, plinth behind, no UE        20X CG/CS  20  20x            Minisquats        10X2 BUE  20 ea  BUE  20 ea                                                                                                                                                                                         Modalities                                                                                                      Assessment: Tolerated treatment fair  Patient exhibited good technique with therapeutic exercises and would benefit from continued PT  Pt had difficulty counting seconds during stretches and sometimes understanding exercises  Pt was cued to put weight back through heels while performing squats  Pt responded well to verbal cues while performing squats  Pt requires more supervision to make sure he is doing the exercise properly  Pt denies pain post treatment  Plan: Continue per plan of care  Progress treatment as tolerated

## 2018-09-18 ENCOUNTER — OFFICE VISIT (OUTPATIENT)
Dept: PHYSICAL THERAPY | Facility: CLINIC | Age: 68
End: 2018-09-18
Payer: MEDICARE

## 2018-09-18 DIAGNOSIS — G89.29 CHRONIC RIGHT-SIDED LOW BACK PAIN WITHOUT SCIATICA: Primary | ICD-10-CM

## 2018-09-18 DIAGNOSIS — M54.50 CHRONIC RIGHT-SIDED LOW BACK PAIN WITHOUT SCIATICA: Primary | ICD-10-CM

## 2018-09-18 PROCEDURE — 97110 THERAPEUTIC EXERCISES: CPT

## 2018-09-18 NOTE — PROGRESS NOTES
Daily Note     Today's date: 2018  Patient name: Marce Cardenas  : 1950  MRN: [id number]  Referring provider: Norbert Felder MD  Dx:   Encounter Diagnosis     ICD-10-CM    1  Chronic right-sided low back pain without sciatica M54 5     G89 29                   Subjective: Pt states he doesn't have any pain today        Objective: See treatment diary below    Precautions: None stated on script; (+) recent fall     Daily Treatment Diary      Manual                                                                                                                                                      Exercise Diary         Supine   LTR    nv 5"  10X2      3' (5")  4' (10")  10" hold x 5 ea  10"x10 ea   10"x 10 ea       KTC stretch nv  10"X10  4' (10")  DKTC w/ ball/assist 10"X10  SKTC c towel  5" x 10  SKTC towel 5"x10   SKTC towel 5"x10       PPT nv  10X2 michael abd 10X2  michael abd 10X2    20x    10x       PPT w/ marching    nv  np  3'  np    20x    10x       HS stretch nv  np  seated  10"X10  stand  4' (10")  seated 3 x 30" ea  Seated 3x30"   Seated 3x30"       Bike nv  8'  8'  8'    8'    8'         Supine, BLEs on ball, DF + knee ext    5"  10X2  DF 10X2; DF w/ ext 20X w/ assist  np    np   np        Seated   Marching  ( - alt stool taps       10X2  10X2  Standing  W/ CS     10X2 2# w/ CS  20 ea  20ea    10 ea        Seated LAQs    10X2  np  np    np    np        Bent over alt LE ext      3' w/ pelvis stabilized  np    np    x10 ea        Adma HR facing plinth, no UE        20X CS  20  20x    x20        Adam TR, plinth behind, no UE        20X CG/CS  20  20x    x20        Minisquats        10X2 BUE  20 ea  BUE  20 ea   x20                                                                                                                                                                                      Modalities                                                                                                      Assessment: Pt tolerated tx fair  Many vc's for PPT and PPT marching  Constant supervision required throughout tx  Pt performed SKTC and LTR well  Pt reports no pain post therapy session  Plan: Continue POC

## 2018-09-20 ENCOUNTER — OFFICE VISIT (OUTPATIENT)
Dept: PHYSICAL THERAPY | Facility: CLINIC | Age: 68
End: 2018-09-20
Payer: MEDICARE

## 2018-09-20 DIAGNOSIS — M54.50 CHRONIC RIGHT-SIDED LOW BACK PAIN WITHOUT SCIATICA: Primary | ICD-10-CM

## 2018-09-20 DIAGNOSIS — G89.29 CHRONIC RIGHT-SIDED LOW BACK PAIN WITHOUT SCIATICA: Primary | ICD-10-CM

## 2018-09-20 PROCEDURE — 97110 THERAPEUTIC EXERCISES: CPT

## 2018-09-20 PROCEDURE — 97112 NEUROMUSCULAR REEDUCATION: CPT

## 2018-09-20 NOTE — PROGRESS NOTES
Daily Note     Today's date: 2018  Patient name: Penelope Haywood  : 1950  MRN: [id number]  Referring provider: Zee Giraldo MD  Dx:   Encounter Diagnosis     ICD-10-CM    1  Chronic right-sided low back pain without sciatica M54 5     G89 29        Start Time: 1432  Stop Time: 1527  Total time in clinic (min): 55 minutes    Subjective: Pt reports no pain today, mild at worst in the past week (unable to rate)  Objective: See treatment diary below  Instructed Pt on PBM w/ handout issued  L-T spine AROM grossly WFL (FB 90%, BB 50%, SB WFL B sides, ROT R 75%/L 90%)  Passive SLR 65 deg B sides       Precautions: None stated on script; (+) recent fall     Daily Treatment Diary      Manual                                                                                                                                                      Exercise Diary       Supine   LTR    nv 5"  10X2      3' (5")  4' (10")  10" hold x 5 ea  10"x10 ea   10"x 10 ea  np     KTC stretch nv  10"X10  4' (10")  DKTC w/ ball/assist 10"X10  SKTC c towel  5" x 10  SKTC towel 5"x10   SKTC towel 5"x10  SKTC towel   10"X5     PPT nv  10X2 michael abd 10X2  michael abd 10X2    20x    10x  3" 10x2     PPT w/ marching    nv  np  3'  np    20x    10x  michael abd w march 10x2     HS stretch nv  np  seated  10"X10  stand  4' (10")  seated 3 x 30" ea  Seated 3x30"   Seated 3x30"  seated w/ stool      Bike nv  8'  8'  8'    8'    8'   8'      Supine, BLEs on ball, DF + knee ext    5"  10X2  DF 10X2; DF w/ ext 20X w/ assist  np    np   np  np      Seated   Marching  ( - alt stool taps       10X2  10X2  Standing  W/ CS     10X2 2# w/ CS  20 ea  20ea    10 ea  standing w/ CS 15x2      Seated LAQs    10X2  np  np    np    np  np      Bent over alt LE ext      3' w/ pelvis stabilized  np    np    x10 ea  np      Adam HR facing plinth, no UE        20X CS  20  20x    x20  15x2      Adam TR, plinth behind, no UE        20X CG/CS  20  20x    x20  15x2      Minisquats        10X2 BUE  20 ea  BUE  20 ea   x20   10x2 no UE                                                                                                                                                                                   Modalities                                                                                                      Assessment: Pt tolerated tx good - no pain T/O but requires regular supervision/multiple cues to do ex in good form and to count  CGA 1X to correct LOB w/ janine HR ex  Pt verb understanding PBM  Improving L-T spine AROM, HS flexibility, and overall endurance  Pt would benefit from continued PT  Plan: Continue PT  Re-eval NV

## 2018-09-25 ENCOUNTER — EVALUATION (OUTPATIENT)
Dept: PHYSICAL THERAPY | Facility: CLINIC | Age: 68
End: 2018-09-25
Payer: MEDICARE

## 2018-09-25 DIAGNOSIS — Z74.09 IMPAIRED FUNCTIONAL MOBILITY, BALANCE, AND ENDURANCE: Primary | ICD-10-CM

## 2018-09-25 DIAGNOSIS — G89.29 CHRONIC RIGHT-SIDED LOW BACK PAIN WITHOUT SCIATICA: ICD-10-CM

## 2018-09-25 DIAGNOSIS — M54.50 CHRONIC RIGHT-SIDED LOW BACK PAIN WITHOUT SCIATICA: ICD-10-CM

## 2018-09-25 PROCEDURE — 97110 THERAPEUTIC EXERCISES: CPT

## 2018-09-25 PROCEDURE — G8990 OTHER PT/OT CURRENT STATUS: HCPCS

## 2018-09-25 PROCEDURE — G8991 OTHER PT/OT GOAL STATUS: HCPCS

## 2018-09-25 PROCEDURE — 97112 NEUROMUSCULAR REEDUCATION: CPT

## 2018-09-25 NOTE — PROGRESS NOTES
PT RE-EVALUATION     Today's date: 2018  Patient name: Ankit Rollins  : 1950  MRN: [id number]  Referring provider: Emily Soliz MD  Dx:   Encounter Diagnosis     ICD-10-CM    1  Impaired functional mobility, balance, and endurance Z74 09    2  Chronic right-sided low back pain without sciatica M54 5     G89 29        Start Time: 1410  Stop Time: 1515  Total time in clinic (min): 65 minutes    Subjective: Pt reports having a brief episode of severe LBP in the past week when he lifted up his 2 y/o grand child  Otherwise, no pain  Denies falls since onset of PT  Objective: (See flow sheet below for treatment details )    L-T spine AROM:   Grossly WFL FB 90%, BB 50%, SB WFL B sides, ROT R 75%/L 90% (vs eval  FB ~70%, BB ~50%, SB/ROT ~50% B sides)    Strength: BLEs grossly 4+/5 prox, 5/5 distally (vs eval 3 to 4/5  Prox, 5/5 distally)    Functional: Amb w/o AD w/ report of standing/walking tolerance of 20' max  Modified Oswestry Questionnaire 16 8 (vs 26 7 at eval) - decreased back restrictions  Balance:  HUTCHINS 45/56    Assessment: Pt received 9 PT visits w/ fair progress - improved L-T spine AROM,  increased prox LE ms strength and w/ decreased back restrictions per questionnaire  Pt w/ poor carryover of exers instructed (needs regular cues to demo correctly) and w/ self postural correction  Pt agreeable to focusing tx on improving balance for fall prevention  Goals  STGS (2 weeks): 1  Increase L-T spine ROT AROM to 75% or better (MET) ; passive SLR to 60 to 65 deg (PROGRESSING)                               2  Indep w/ initial HEP w/ copy - MET (needs daughters assist)                               3  Pt will be educ on PBM and increasing postural awareness - MET (needs to improve compliance)  LTGs (4 weeks): 1   Increase prox LE ms strength to by ½ to 1 grade - MET                               2   Demo sit-stand w/o UE use; progress to floor-waist lift w/ good tech when appropriate - PROGRESSING                               3  Madelin progressive trunk stab ex/functional act  for standing/walking  X 20-30' w/ 2/10 LBP or less per report - PROGRESSING                               4  Indep w/ HEP update w/ copy - PROGRESSING (suggest daughter to come 1 visit for instruction on guarding techniques)  Additional Goal (2-3 weeks): Increase HUTCHINS score to 48/56 to decrease fall risk  Plan:  Continue PT 2x/week x 3 weeks      Precautions: None stated on script; (+) recent fall     Daily Treatment Diary           Exercise Diary  8/27 8/28 9/4 9/6 9/11 9/13 9/18 9/20 9/25   Supine   LTR    nv 5"  10X2      3' (5")  4' (10")  10" hold x 5 ea  10"x10 ea   10"x 10 ea  np  HEP   KTC stretch nv  10"X10  4' (10")  DKTC w/ ball/assist 10"X10  SKTC c towel  5" x 10  SKTC towel 5"x10   SKTC towel 5"x10  SKTC towel   10"X5  HEP   PPT nv  10X2 michael abd 10X2  michael abd 10X2    20x    10x  3" 10x2  HEP   PPT w/ marching    nv  np  3'  np    20x    10x  michael abd w march 10x2  HEP   HS stretch nv  np  seated  10"X10  stand  4' (10")  seated 3 x 30" ea  Seated 3x30"   Seated 3x30"  seated w/ stool   seated w/ stool 3x30"   Bike nv  8'  8'  8'    8'    8'   8'  6'    Supine, BLEs on ball, DF + knee ext    5"  10X2  DF 10X2; DF w/ ext 20X w/ assist  np    np   np  np  DC    Seated   Marching  (9/6 - alt stool taps       10X2  10X2  Standing  W/ CS     10X2 2# w/ CS  20 ea  20ea    10 ea  standing w/ CS 15x2 Standiing w/ CS     10 reps    Seated LAQs    10X2  np  np    np    np  np  DC    Bent over alt LE ext      3' w/ pelvis stabilized  np    np    x10 ea  np  DC    Adam HR facing plinth, no UE        20X CS  20  20x    x20  15x2  15x2    Adam TR, plinth behind, no UE        20X CG/CS  20  20x    x20  15x2  15x2    Minisquats        10X2 BUE  20 ea  BUE  20 ea   x20   10x2 no UE  Sit - stand w/o UE use 5x2    Alt SLS                    nv    Carioca                    nv                                                                                                                                 Modalities

## 2018-09-27 ENCOUNTER — OFFICE VISIT (OUTPATIENT)
Dept: PHYSICAL THERAPY | Facility: CLINIC | Age: 68
End: 2018-09-27
Payer: MEDICARE

## 2018-09-27 DIAGNOSIS — G89.29 CHRONIC RIGHT-SIDED LOW BACK PAIN WITHOUT SCIATICA: Primary | ICD-10-CM

## 2018-09-27 DIAGNOSIS — Z74.09 IMPAIRED FUNCTIONAL MOBILITY, BALANCE, AND ENDURANCE: ICD-10-CM

## 2018-09-27 DIAGNOSIS — M54.50 CHRONIC RIGHT-SIDED LOW BACK PAIN WITHOUT SCIATICA: Primary | ICD-10-CM

## 2018-09-27 PROCEDURE — 97112 NEUROMUSCULAR REEDUCATION: CPT

## 2018-09-27 PROCEDURE — 97110 THERAPEUTIC EXERCISES: CPT

## 2018-09-27 NOTE — PROGRESS NOTES
Daily Note     Today's date: 2018  Patient name: Derick Ramey  : 1950  MRN: [id number]  Referring provider: Koko Dennis MD  Dx:   Encounter Diagnosis     ICD-10-CM    1  Chronic right-sided low back pain without sciatica M54 5     G89 29    2  Impaired functional mobility, balance, and endurance Z74 09                   Subjective: Patient denies symptoms this afternoon and reports he feels pretty good        Objective: See treatment diary below    Precautions: None stated on script; (+) recent fall     Daily Treatment Diary           Exercise Diary     Supine   LTR    nv 5"  10X2      3' (5")  4' (10")  10" hold x 5 ea  10"x10 ea   10"x 10 ea  np  HEP HEP   KTC stretch nv  10"X10  4' (10")  DKTC w/ ball/assist 10"X10  SKTC c towel  5" x 10  SKTC towel 5"x10   SKTC towel 5"x10  SKTC towel   10"X5  HEP HEP   PPT nv  10X2 michael abd 10X2  michael abd 10X2    20x    10x  3" 10x2  HEP HEP   PPT w/ marching    nv  np  3'  np    20x    10x  michael abd w march 10x2  HEP HEP   HS stretch nv  np  seated  10"X10  stand  4' (10")  seated 3 x 30" ea  Seated 3x30"   Seated 3x30"  seated w/ stool   seated w/ stool 3x30" Seatedw/stool  3x3"   Bike nv  8'  8'  8'    8'    8'   8'  6' 6'    Supine, BLEs on ball, DF + knee ext    5"  10X2  DF 10X2; DF w/ ext 20X w/ assist  np    np   np  np  DC D/C    Seated   Marching  ( - alt stool taps       10X2  10X2  Standing  W/ CS     10X2 2# w/ CS  20 ea  20ea    10 ea  standing w/ CS 15x2 Standiing w/ CS     10 reps Standing  W/CGA  2 x 10 reps    Seated LAQs    10X2  np  np    np    np  np  DC D/C    Bent over alt LE ext      3' w/ pelvis stabilized  np    np    x10 ea  np  DC D/C    Adam HR facing plinth, no UE        20X CS  20  20x    x20  15x2  15x2 15x2    Adam TR, plinth behind, no UE        20X CG/CS  20  20x    x20  15x2  15x2 15x2    Minisquats        10X2 BUE  20 ea  BUE  20 ea   x20   10x2 no UE  Sit - stand w/o UE use 5x2 Sit>Stand  5x2    Alt SLS                    nv 20"x2 ea      Carioca                    nv unable    Side Step                     6 ft x 4    Gait Training                     3 x around the gym                                                                                                  Modalities                                                                                                    Assessment: Tolerated treatment well without complaints  Had difficulty attempting carioca, but was able to perform sidestepping  Educated on maintaining a wider ZBIGNIEW while ambulating and also to increase heel/toe push off  Plan: Continue therapy as tolerated per Plan of Care

## 2018-10-02 ENCOUNTER — OFFICE VISIT (OUTPATIENT)
Dept: PHYSICAL THERAPY | Facility: CLINIC | Age: 68
End: 2018-10-02
Payer: MEDICARE

## 2018-10-02 DIAGNOSIS — M54.50 CHRONIC RIGHT-SIDED LOW BACK PAIN WITHOUT SCIATICA: Primary | ICD-10-CM

## 2018-10-02 DIAGNOSIS — Z74.09 IMPAIRED FUNCTIONAL MOBILITY, BALANCE, AND ENDURANCE: ICD-10-CM

## 2018-10-02 DIAGNOSIS — G89.29 CHRONIC RIGHT-SIDED LOW BACK PAIN WITHOUT SCIATICA: Primary | ICD-10-CM

## 2018-10-02 PROCEDURE — 97150 GROUP THERAPEUTIC PROCEDURES: CPT

## 2018-10-02 PROCEDURE — 97112 NEUROMUSCULAR REEDUCATION: CPT

## 2018-10-02 PROCEDURE — 97110 THERAPEUTIC EXERCISES: CPT

## 2018-10-02 NOTE — PROGRESS NOTES
Daily Note     Today's date: 10/2/2018  Patient name: Jamilah Rodríguez  : 1950  MRN: [id number]  Referring provider: Mat Hodges MD  Dx:   Encounter Diagnosis     ICD-10-CM    1  Chronic right-sided low back pain without sciatica M54 5     G89 29    2  Impaired functional mobility, balance, and endurance Z74 09                   Subjective: Pt states he "feels fine" regardless of "common stiffness and vearing pain" in low back         Objective: See treatment diary below    Precautions: None stated on script; (+) recent fall     Daily Treatment Diary           Exercise Diary  10/2  8/28  9/4  9/6  9/11  9/13    9/18  9/20  9/25 9/27   Supine   LTR  D/C 5"  10X2      3' (5")  4' (10")  10" hold x 5 ea  10"x10 ea   10"x 10 ea  np  HEP HEP   KTC stretch HEP* 15''x5  10"X10  4' (10")  DKTC w/ ball/assist 10"X10  SKTC c towel  5" x 10  SKTC towel 5"x10   SKTC towel 5"x10  SKTC towel   10"X5  HEP HEP   PPT   10X2 michael abd 10X2  michael abd 10X2    20x    10x  3" 10x2  HEP HEP   PPT w/ marching   np  3'  np    20x    10x  michael abd w march 10x2  HEP HEP   HS stretch seated w/ stool 3x30"  np  seated  10"X10  stand  4' (10")  seated 3 x 30" ea  Seated 3x30"   Seated 3x30"  seated w/ stool   seated w/ stool 3x30" Seatedw/stool  3x3"   Bike 6'  8'  8'  8'    8'    8'   8'  6' 6'    Supine, BLEs on ball, DF + knee ext  D/C  5"  10X2  DF 10X2; DF w/ ext 20X w/ assist  np    np   np  np  DC D/C    Seated   Marching  ( - alt stool taps     30'' standing march  10X2  10X2  Standing  W/ CS     10X2 2# w/ CS  20 ea  20ea    10 ea  standing w/ CS 15x2 Standiing w/ CS     10 reps Standing  W/CGA  2 x 10 reps    Seated LAQs     10X2  np  np    np    np  np  DC D/C    Bent over alt LE ext  D/C    3' w/ pelvis stabilized  np    np    x10 ea  np  DC D/C    Adam HR facing plinth, no UE  30      20X CS  20  20x    x20  15x2  15x2 15x2    Adam TR, plinth behind, no UE  30      20X CG/CS  20  20x    x20  15x2  15x2 15x2    Minisquats 30      10X2 BUE  20 ea  BUE  20 ea   x20   10x2 no UE  Sit - stand w/o UE use 5x2 Sit>Stand  5x2    Alt SLS 3x30''                   nv 20"x2 ea      Carioca   np                  nv unable    Side Step  4 laps min A                   6 ft x 4    Gait Training   ---                   3 x around the gym    clamshell   nv                       supine Alt arm leg   nv                       sit -->stand   nv                                          Modalities                                                                                                    Assessment: Tolerated treatment well  Pt became dizzy after SLS but symptom subsided quickly; rest periods b/w exercises nv  Pt felt no stretch c LTR therefore D/C  Pt performs KTC as HEP*  Pt performed good body mechanics picking object off floor, However vc's to correct body mechanics while sitting up from mat  Pt "feels like a new man" after therapy tx  Plan: Progress nv as tolerated  Incorporate LTGs: clamshell, supine Alt arm leg, and sit to stand exercises for nv

## 2018-10-04 ENCOUNTER — OFFICE VISIT (OUTPATIENT)
Dept: PHYSICAL THERAPY | Facility: CLINIC | Age: 68
End: 2018-10-04
Payer: MEDICARE

## 2018-10-04 DIAGNOSIS — M54.50 CHRONIC RIGHT-SIDED LOW BACK PAIN WITHOUT SCIATICA: Primary | ICD-10-CM

## 2018-10-04 DIAGNOSIS — G89.29 CHRONIC RIGHT-SIDED LOW BACK PAIN WITHOUT SCIATICA: Primary | ICD-10-CM

## 2018-10-04 DIAGNOSIS — Z74.09 IMPAIRED FUNCTIONAL MOBILITY, BALANCE, AND ENDURANCE: ICD-10-CM

## 2018-10-04 PROCEDURE — 97110 THERAPEUTIC EXERCISES: CPT

## 2018-10-04 NOTE — PROGRESS NOTES
Daily Note     Today's date: 10/4/2018  Patient name: Dolores Valdez  : 1950  MRN: [id number]  Referring provider: Livier Jo MD  Dx:   Encounter Diagnosis     ICD-10-CM    1  Chronic right-sided low back pain without sciatica M54 5     G89 29    2  Impaired functional mobility, balance, and endurance Z74 09                   Subjective: Pt reports with c/o intermittent pain in LB  He denied pain post treatment        Objective: See treatment diary below    Precautions: None stated on script; (+) recent fall     Daily Treatment Diary           Exercise Diary  10/2  10/4  9/4  9/6  9/11  9/13    9/18  9/20  9/25 9/27   Supine   LTR  D/C DC      3' (5")  4' (10")  10" hold x 5 ea  10"x10 ea   10"x 10 ea  np  HEP HEP   KTC stretch HEP* 15''x5 DC  4' (10")  DKTC w/ ball/assist 10"X10  SKTC c towel  5" x 10  SKTC towel 5"x10   SKTC towel 5"x10  SKTC towel   10"X5  HEP HEP   PPT  DC michael abd 10X2  michael abd 10X2    20x    10x  3" 10x2  HEP HEP   PPT w/ marching  DC  3'  np    20x    10x  michael abd w march 10x2  HEP HEP   HS stretch seated w/ stool 3x30" Seated w/ stool 30"x3  seated  10"X10  stand  4' (10")  seated 3 x 30" ea  Seated 3x30"   Seated 3x30"  seated w/ stool   seated w/ stool 3x30" Seatedw/stool  3x3"   Bike 6'  8'  8'  8'    8'    8'   8'  6' 6'    Supine, BLEs on ball, DF + knee ext  D/C ---  DF 10X2; DF w/ ext 20X w/ assist  np    np   np  np  DC D/C    Seated   Marching  ( - alt stool taps     30'' standing march  30x    10X2  Standing  W/ CS     10X2 2# w/ CS  20 ea  20ea    10 ea  standing w/ CS 15x2 Standiing w/ CS     10 reps Standing  W/CGA  2 x 10 reps    Seated LAQs     DC  np  np    np    np  np  DC D/C    Bent over alt LE ext  D/C  ---  3' w/ pelvis stabilized  np    np    x10 ea  np  DC D/C    Adam HR facing plinth, no UE  30  30    20X CS  20  20x    x20  15x2  15x2 15x2    Adam TR, plinth behind, no UE  30  30    20X CG/CS  20  20x    x20  15x2  15x2 15x2    Minisquats  30  30    10X2 BUE  20 ea  BUE  20 ea   x20   10x2 no UE  Sit - stand w/o UE use 5x2 Sit>Stand  5x2    Alt SLS 3x30''   30"x3                nv 20"x2 ea      Carioca   np                  nv unable    Side Step  4 laps min A  4 laps                 6 ft x 4    Gait Training   ---                   3 x around the gym    clamshell   nv  nv                     supine Alt arm leg   nv  3"x5 ea w/ ab bracing                     sit -->stand   nv  10x                                        Modalities                                                                                                    Assessment: Good tolerance with progressions this visit  Pt required frequent VC/TC's to follow through exercises, particularly with addition of alternating UE/LE lifts  No c/o pain throughout treatment  Pain free post treatment  Plan: Continue per POC and progress as able

## 2018-10-09 ENCOUNTER — OFFICE VISIT (OUTPATIENT)
Dept: PHYSICAL THERAPY | Facility: CLINIC | Age: 68
End: 2018-10-09
Payer: MEDICARE

## 2018-10-09 DIAGNOSIS — Z74.09 IMPAIRED FUNCTIONAL MOBILITY, BALANCE, AND ENDURANCE: ICD-10-CM

## 2018-10-09 DIAGNOSIS — M54.50 CHRONIC RIGHT-SIDED LOW BACK PAIN WITHOUT SCIATICA: Primary | ICD-10-CM

## 2018-10-09 DIAGNOSIS — G89.29 CHRONIC RIGHT-SIDED LOW BACK PAIN WITHOUT SCIATICA: Primary | ICD-10-CM

## 2018-10-09 PROCEDURE — 97110 THERAPEUTIC EXERCISES: CPT

## 2018-10-09 PROCEDURE — 97112 NEUROMUSCULAR REEDUCATION: CPT

## 2018-10-09 NOTE — PROGRESS NOTES
Daily Note     Today's date: 10/9/2018  Patient name: Kaitlin Austin  : 1950  MRN: [id number]  Referring provider: Neil Carlson MD  Dx:   Encounter Diagnosis     ICD-10-CM    1  Chronic right-sided low back pain without sciatica M54 5     G89 29    2  Impaired functional mobility, balance, and endurance Z74 09                   Subjective: Pt denies LOB or falls since last visit  Pt states feeling good today         Objective: See treatment diary below    Precautions: None stated on script; (+) recent fall     Daily Treatment Diary           Exercise Diary  10/2  10/4 10/9  9/6  9/11  9/13    9/18  9/20  9/25 9/27   Supine   LTR  D/C DC     D/C  4' (10")  10" hold x 5 ea  10"x10 ea   10"x 10 ea  np  HEP HEP   KTC stretch HEP* 15''x5 DC D/C  DKTC w/ ball/assist 10"X10  SKTC c towel  5" x 10  SKTC towel 5"x10   SKTC towel 5"x10  SKTC towel   10"X5  HEP HEP   PPT  DC D/C  michael abd 10X2    20x    10x  3" 10x2  HEP HEP   PPT w/ marching  DC D/C  np    20x    10x  michael abd w march 10x2  HEP HEP   HS stretch seated w/ stool 3x30" Seated w/ stool 30"x3 Seated w/ stool 30"x3  stand  4' (10")  seated 3 x 30" ea  Seated 3x30"   Seated 3x30"  seated w/ stool   seated w/ stool 3x30" Seatedw/stool  3x3"   Bike 6'  8' 8'  8'    8'    8'   8'  6' 6'    Supine, BLEs on ball, DF + knee ext  D/C --- D/C  np    np   np  np  DC D/C    Seated   Marching  ( - alt stool taps     30'' standing march  30x    x30  10X2 2# w/ CS  20 ea  20ea    10 ea  standing w/ CS 15x2 Standiing w/ CS     10 reps Standing  W/CGA  2 x 10 reps    Adam HR facing plinth, no UE  30  30 30  20X CS  20  20x    x20  15x2  15x2 15x2    Adam TR, plinth behind, no UE  30  30 30  20X CG/CS  20  20x    x20  15x2  15x2 15x2    Minisquats  30  30 30  10X2 BUE  20 ea  BUE  20 ea   x20   10x2 no UE  Sit - stand w/o UE use 5x2 Sit>Stand  5x2    Alt SLS 3x30''   30"x3 3x30''              nv 20"x2 ea      Carioca   np   np               nv unable    Side Step  4 laps min A  4 laps 4 laps               6 ft x 4    Gait Training   ---    ---               3 x around the gym    clamshell   nv  nv  x15 ea                   supine Alt arm leg   nv  3"x5 ea w/ ab bracing 3''x15 c ab bracing                   sit -->stand   nv  10x  x10                        Modalities                                                                                                    Assessment: Pt tolerated tx well  Great body mechanics during sit to stand exercise, increase reps nv  Good tolerance with new clamshell exercise  Pt improved in supine alt arm leg exercise, however verbal and tactile cues required  Plan: Continue per POC and progress as able

## 2018-10-11 ENCOUNTER — OFFICE VISIT (OUTPATIENT)
Dept: PHYSICAL THERAPY | Facility: CLINIC | Age: 68
End: 2018-10-11
Payer: MEDICARE

## 2018-10-11 DIAGNOSIS — G89.29 CHRONIC RIGHT-SIDED LOW BACK PAIN WITHOUT SCIATICA: Primary | ICD-10-CM

## 2018-10-11 DIAGNOSIS — Z74.09 IMPAIRED FUNCTIONAL MOBILITY, BALANCE, AND ENDURANCE: ICD-10-CM

## 2018-10-11 DIAGNOSIS — M54.50 CHRONIC RIGHT-SIDED LOW BACK PAIN WITHOUT SCIATICA: Primary | ICD-10-CM

## 2018-10-11 PROCEDURE — 97112 NEUROMUSCULAR REEDUCATION: CPT

## 2018-10-11 NOTE — PROGRESS NOTES
Daily Note     Today's date: 10/11/2018  Patient name: Pavithra Khalil  : 1950  MRN: [id number]  Referring provider: Helen Almazan MD  Dx:   Encounter Diagnosis     ICD-10-CM    1  Chronic right-sided low back pain without sciatica M54 5     G89 29    2  Impaired functional mobility, balance, and endurance Z74 09               Subjective: Pt reports no falls or significant LOB since LV and notes no increase in LBP in the past week  Objective: See treatment diary below    Precautions: Fall risk     Daily Treatment Diary   Exercise Diary  10/2  10/4 10/9 10/11          HS stretch seated w/ stool 3x30" Seated w/ stool 30"x3 Seated w/ stool 30"x3 Seated w/stool 30"x3          Bike 6'  8' 8' 8'           Seated   Marching  ( - alt stool taps  30'' standing march  30x    x30 30x          HR facing plinth, no UE  30  30 30 30x          TR plinth behind  no UE  30  30 30 30x           Minisquats  30  30 30 30x           Alt SLS 3x30''   30"x3 3x30'' 30"x2 ea           Side Step  4 laps min A  4 laps 4 laps 3 laps           clamshell   nv  nv  x15 ea 15x ea           supine Alt arm leg   nv  3"x5 ea w/ ab bracing 3''x15 c ab bracing 3"x15 w/TA           sit -->stand   nv  10x  x10 np          *Unsupervised from 2:30-2:45pm    Assessment: Tolerated treatment well  Patient demonstrated fatigue post treatment, exhibited good technique with therapeutic exercises and would benefit from continued PT to address continued instability and remaining balance deficits  Plan: Continue per plan of care  Progress treatment as tolerated          Ally Brandt PTA

## 2018-10-16 ENCOUNTER — OFFICE VISIT (OUTPATIENT)
Dept: PHYSICAL THERAPY | Facility: CLINIC | Age: 68
End: 2018-10-16
Payer: MEDICARE

## 2018-10-16 DIAGNOSIS — M54.50 CHRONIC RIGHT-SIDED LOW BACK PAIN WITHOUT SCIATICA: Primary | ICD-10-CM

## 2018-10-16 DIAGNOSIS — G89.29 CHRONIC RIGHT-SIDED LOW BACK PAIN WITHOUT SCIATICA: Primary | ICD-10-CM

## 2018-10-16 DIAGNOSIS — Z74.09 IMPAIRED FUNCTIONAL MOBILITY, BALANCE, AND ENDURANCE: ICD-10-CM

## 2018-10-16 PROCEDURE — 97110 THERAPEUTIC EXERCISES: CPT

## 2018-10-16 NOTE — PROGRESS NOTES
Daily Note     Today's date: 10/16/2018  Patient name: Faizan Reyes  : 1950  MRN: [id number]  Referring provider: Jasmin Cardenas MD  Dx:   Encounter Diagnosis     ICD-10-CM    1  Chronic right-sided low back pain without sciatica M54 5     G89 29    2  Impaired functional mobility, balance, and endurance Z74 09               Subjective: Pt reports no falls or significant LOB since LV and notes no increase in LBP in the past week  Objective: See treatment diary below    Precautions: Fall risk     Daily Treatment Diary   Exercise Diary  10/2  10/4 10/9 10/11 10/16         HS stretch seated w/ stool 3x30" Seated w/ stool 30"x3 Seated w/ stool 30"x3 Seated w/stool 30"x3 Seated (no stool)  30"x3         Bike 6'  8' 8' 8' 8'          Seated   Marching  ( - alt stool taps  30'' standing march  30x    x30 30x Stand x30         HR facing plinth, no UE  30  30 30 30x 30x         TR plinth behind  no UE  30  30 30 30x 30x          Minisquats  30  30 30 30x 30x          Alt SLS 3x30''   30"x3 3x30'' 30"x2 ea 30"x2          Side Step  4 laps min A  4 laps 4 laps 3 laps 3 laps          clamshell   nv  nv  x15 ea 15x ea 20x ea          supine Alt arm leg   nv  3"x5 ea w/ ab bracing 3''x15 c ab bracing 3"x15 w/TA 3"x15 w/ TA          sit -->stand   nv  10x  x10 np          *Unsupervised from 2:30-2:45pm    Assessment: Tolerated treatment well  Patient demonstrated fatigue post treatment, exhibited good technique with therapeutic exercises and would benefit from continued PT to address continued instability and remaining balance deficits  Plan: Continue per plan of care  Progress treatment as tolerated          Asha Cotto PTA

## 2018-10-18 ENCOUNTER — OFFICE VISIT (OUTPATIENT)
Dept: PHYSICAL THERAPY | Facility: CLINIC | Age: 68
End: 2018-10-18
Payer: MEDICARE

## 2018-10-18 DIAGNOSIS — G89.29 CHRONIC RIGHT-SIDED LOW BACK PAIN WITHOUT SCIATICA: Primary | ICD-10-CM

## 2018-10-18 DIAGNOSIS — Z74.09 IMPAIRED FUNCTIONAL MOBILITY, BALANCE, AND ENDURANCE: ICD-10-CM

## 2018-10-18 DIAGNOSIS — M54.50 CHRONIC RIGHT-SIDED LOW BACK PAIN WITHOUT SCIATICA: Primary | ICD-10-CM

## 2018-10-18 PROCEDURE — 97110 THERAPEUTIC EXERCISES: CPT

## 2018-10-18 NOTE — PROGRESS NOTES
Daily Note     Today's date: 10/18/2018  Patient name: Jamilah Rodríguez  : 1950  MRN: [id number]  Referring provider: Mat Hodges MD  Dx:   Encounter Diagnosis     ICD-10-CM    1  Chronic right-sided low back pain without sciatica M54 5     G89 29    2  Impaired functional mobility, balance, and endurance Z74 09               Subjective: Pt denies low back pain upon arrival and "has been feeling good"  Objective: See treatment diary below    Precautions: Fall risk     Daily Treatment Diary   Exercise Diary  10/2  10/4 10/9 10/11 10/16 10/18        HS stretch seated w/ stool 3x30" Seated w/ stool 30"x3 Seated w/ stool 30"x3 Seated w/stool 30"x3 Seated (no stool)  30"x3 Seated w/stool 30"x3 ea        Bike 6'  8' 8' 8' 8' 10'         Seated   Marching  30'' standing  30x    x30 30x Standing x30 Stand 15x ea        HR facing plinth, no UE  30  30 30 30x 30x 30x at rail        TR plinth behind  no UE  30  30 30 30x 30x 20x at rail         Minisquats  30  30 30 30x 30x 30x         Alt SLS 3x30''   30"x3 3x30'' 30"x2 ea 30"x2 30"x2 ea         Side Step  4 laps min A  4 laps 4 laps 3 laps 3 laps 3 laps         clamshell   nv  nv  x15 ea 15x ea 20x ea 20x supine GTB         supine Alt arm leg   nv  3"x5 ea w/ ab bracing 3''x15 c ab bracing 3"x15 w/TA 3"x15 w/ TA 3"x15 ea w/TA         sit -->stand   nv  10x  x10 np  ---          Assessment: Tolerated treatment well  Patient demonstrated fatigue post treatment and exhibited good technique with therapeutic exercises, though he req cuing for form on squats and HR  Plan: Continue per plan of care  Progress treatment as tolerated        Alex Saavedra, SUZANNA

## 2018-10-23 ENCOUNTER — EVALUATION (OUTPATIENT)
Dept: PHYSICAL THERAPY | Facility: CLINIC | Age: 68
End: 2018-10-23
Payer: MEDICARE

## 2018-10-23 DIAGNOSIS — Z74.09 IMPAIRED FUNCTIONAL MOBILITY, BALANCE, AND ENDURANCE: ICD-10-CM

## 2018-10-23 DIAGNOSIS — M54.50 CHRONIC RIGHT-SIDED LOW BACK PAIN WITHOUT SCIATICA: Primary | ICD-10-CM

## 2018-10-23 DIAGNOSIS — G89.29 CHRONIC RIGHT-SIDED LOW BACK PAIN WITHOUT SCIATICA: Primary | ICD-10-CM

## 2018-10-23 PROCEDURE — 97140 MANUAL THERAPY 1/> REGIONS: CPT | Performed by: PHYSICAL MEDICINE & REHABILITATION

## 2018-10-23 PROCEDURE — G8992 OTHER PT/OT  D/C STATUS: HCPCS | Performed by: PHYSICAL MEDICINE & REHABILITATION

## 2018-10-23 PROCEDURE — 97110 THERAPEUTIC EXERCISES: CPT | Performed by: PHYSICAL MEDICINE & REHABILITATION

## 2018-10-23 PROCEDURE — G8991 OTHER PT/OT GOAL STATUS: HCPCS | Performed by: PHYSICAL MEDICINE & REHABILITATION

## 2018-10-23 NOTE — PROGRESS NOTES
PT Re-Evaluation  and PT Discharge    Today's date: 10/23/2018  Patient name: Francisco Riojas  : 1950  MRN: [id number]  Referring provider: John Metz MD  Dx:   Encounter Diagnosis     ICD-10-CM    1  Chronic right-sided low back pain without sciatica M54 5     G89 29    2  Impaired functional mobility, balance, and endurance Z74 09        Start Time: 1410  Stop Time: 1510  Total time in clinic (min): 60 minutes    Assessment    Assessment details: Ollie Hammonds has been attending outpatient physical therapy with Chronic right-sided low back pain without sciatica , Impaired functional mobility, balance, and endurance   Since the last assessment, patient demonstrates decreased pain levels, improved range of motion, improved strength, and increased tolerance to activity  Pt has reached maximal benefit of skilled physical therapy and will be discharged at this time to home exercise program     Plan  Treatment plan discussed with: patient  Plan details: Discharge to HEP        Subjective Evaluation    History of Present Illness  Mechanism of injury: Pt reports that he has not experienced LBP for > 2 weeks  He reports compliance with HEP and states that he has relief of stiffness when he performs exercises in home  The pt states that he has noticed an improvement in balance over the past few weeks  Pain  Current pain ratin  At best pain ratin  At worst pain ratin          Objective     Neurological Testing     Additional Neurological Details  Tandem balance 30 sec ea    Active Range of Motion     Lumbar   Flexion: 60 degrees   Extension: 15 degrees   Left lateral flexion: 20 degrees   Right lateral flexion: 20 degrees     Ambulation     Observational Gait   Decreased walking speed and stride length         Flowsheet Rows      Most Recent Value   PT/OT G-Codes   Current Score  65   Projected Score  68   FOTO information reviewed  Yes   Assessment Type  Discharge   G code set  Other PT/OT Primary   Other PT Primary Goal Status ()  CJ   Other PT Primary Discharge Status ()  CJ        Daily Treatment Diary     Precautions: Fall risk       Exercise Diary  10/2  10/4 10/9 10/11 10/16 10/18  10/23           HS stretch seated w/ stool 3x30" Seated w/ stool 30"x3 Seated w/ stool 30"x3 Seated w/stool 30"x3 Seated (no stool)  30"x3 Seated w/stool 30"x3 ea 3 x 30"            Bike 6'  8' 8' 8' 8' 10'              Seated   Marching  30'' standing  30x     x30 30x Standing x30 Stand 15x ea  20           HR facing plinth, no UE  30  30 30 30x 30x 30x at rail             TR plinth behind  no UE  30  30 30 30x 30x 20x at rail              Minisquats  30  30 30 30x 30x 30x              Alt SLS 3x30''   30"x3 3x30'' 30"x2 ea 30"x2 30"x2 ea              Side Step  4 laps min A  4 laps 4 laps 3 laps 3 laps 3 laps              clamshell   nv  nv  x15 ea 15x ea 20x ea 20x supine GTB 20             supine Alt arm leg   nv  3"x5 ea w/ ab bracing 3''x15 c ab bracing 3"x15 w/TA 3"x15 w/ TA 3"x15 ea w/TA              sit -->stand   nv  10x  x10 np   ---

## 2018-10-25 ENCOUNTER — APPOINTMENT (OUTPATIENT)
Dept: PHYSICAL THERAPY | Facility: CLINIC | Age: 68
End: 2018-10-25
Payer: MEDICARE

## 2018-10-30 ENCOUNTER — APPOINTMENT (OUTPATIENT)
Dept: PHYSICAL THERAPY | Facility: CLINIC | Age: 68
End: 2018-10-30
Payer: MEDICARE

## 2020-09-17 ENCOUNTER — DOCTOR'S OFFICE (OUTPATIENT)
Dept: URBAN - METROPOLITAN AREA CLINIC 136 | Facility: CLINIC | Age: 70
Setting detail: OPHTHALMOLOGY
End: 2020-09-17
Payer: COMMERCIAL

## 2020-09-17 DIAGNOSIS — H52.4: ICD-10-CM

## 2020-09-17 DIAGNOSIS — H40.013: ICD-10-CM

## 2020-09-17 DIAGNOSIS — H52.223: ICD-10-CM

## 2020-09-17 PROCEDURE — 92133 CPTRZD OPH DX IMG PST SGM ON: CPT | Performed by: OPTOMETRIST

## 2020-09-17 PROCEDURE — 92004 COMPRE OPH EXAM NEW PT 1/>: CPT | Performed by: OPTOMETRIST

## 2020-09-17 PROCEDURE — S0620 ROUTINE OPHTHALMOLOGICAL EXA: HCPCS | Performed by: OPTOMETRIST

## 2020-09-17 PROCEDURE — 92015 DETERMINE REFRACTIVE STATE: CPT | Performed by: OPTOMETRIST

## 2020-09-17 ASSESSMENT — REFRACTION_AUTOREFRACTION
OS_AXIS: 93
OD_CYLINDER: -1.00
OS_SPHERE: +1.25
OD_AXIS: 93
OS_CYLINDER: -1.50
OD_SPHERE: +0.75

## 2020-09-17 ASSESSMENT — SPHEQUIV_DERIVED
OD_SPHEQUIV: 0.25
OS_SPHEQUIV: 0.5
OS_SPHEQUIV: 0
OD_SPHEQUIV: 0.125

## 2020-09-17 ASSESSMENT — REFRACTION_MANIFEST
OD_VA1: 20/30
OS_AXIS: 095
OD_CYLINDER: -0.75
OU_VA: 20/30
OS_VA1: 20/40
OS_CYLINDER: -1.00
OD_AXIS: 095
OS_SPHERE: +0.50
OS_ADD: +2.50
OD_ADD: +2.50
OD_SPHERE: +0.50

## 2020-09-17 ASSESSMENT — VISUAL ACUITY
OD_BCVA: 20/30
OS_BCVA: 20/40

## 2020-09-17 ASSESSMENT — CONFRONTATIONAL VISUAL FIELD TEST (CVF)
OD_FINDINGS: FULL
OS_FINDINGS: FULL

## 2020-09-23 ENCOUNTER — DOCTOR'S OFFICE (OUTPATIENT)
Dept: URBAN - METROPOLITAN AREA CLINIC 136 | Facility: CLINIC | Age: 70
Setting detail: OPHTHALMOLOGY
End: 2020-09-23
Payer: COMMERCIAL

## 2020-09-23 ENCOUNTER — OPTICAL OFFICE (OUTPATIENT)
Dept: URBAN - METROPOLITAN AREA CLINIC 143 | Facility: CLINIC | Age: 70
Setting detail: OPHTHALMOLOGY
End: 2020-09-23
Payer: COMMERCIAL

## 2020-09-23 DIAGNOSIS — H35.3132: ICD-10-CM

## 2020-09-23 DIAGNOSIS — H52.223: ICD-10-CM

## 2020-09-23 DIAGNOSIS — H40.1134: ICD-10-CM

## 2020-09-23 PROCEDURE — 92020 GONIOSCOPY: CPT | Performed by: OPHTHALMOLOGY

## 2020-09-23 PROCEDURE — V2020 VISION SVCS FRAMES PURCHASES: HCPCS | Performed by: OPTOMETRIST

## 2020-09-23 PROCEDURE — V2103 SPHEROCYLINDR 4.00D/12-2.00D: HCPCS | Performed by: OPTOMETRIST

## 2020-09-23 PROCEDURE — 92014 COMPRE OPH EXAM EST PT 1/>: CPT | Performed by: OPHTHALMOLOGY

## 2020-09-23 PROCEDURE — 92202 OPSCPY EXTND ON/MAC DRAW: CPT | Performed by: OPHTHALMOLOGY

## 2020-09-23 ASSESSMENT — TONOMETRY
OS_IOP_MMHG: 17
OD_IOP_MMHG: 16

## 2020-09-23 ASSESSMENT — CONFRONTATIONAL VISUAL FIELD TEST (CVF)
OS_FINDINGS: FULL
OD_FINDINGS: FULL

## 2020-11-28 PROBLEM — H40.1134: Status: ACTIVE | Noted: 2020-09-23

## 2020-11-28 ASSESSMENT — REFRACTION_MANIFEST
OS_ADD: +2.50
OS_AXIS: 095
OD_VA1: 20/30
OD_CYLINDER: -0.75
OS_VA1: 20/40
OD_SPHERE: +0.50
OU_VA: 20/30
OS_CYLINDER: -1.00
OD_ADD: +2.50
OS_SPHERE: +0.50
OD_AXIS: 095

## 2020-11-28 ASSESSMENT — SPHEQUIV_DERIVED
OS_SPHEQUIV: 0.5
OD_SPHEQUIV: 0.125
OD_SPHEQUIV: 0.25
OS_SPHEQUIV: 0

## 2020-11-28 ASSESSMENT — REFRACTION_AUTOREFRACTION
OD_SPHERE: +0.75
OD_AXIS: 93
OS_CYLINDER: -1.50
OS_SPHERE: +1.25
OS_AXIS: 93
OD_CYLINDER: -1.00

## 2020-11-28 ASSESSMENT — VISUAL ACUITY
OS_BCVA: 20/50-1
OD_BCVA: 20/50+1

## 2021-03-03 NOTE — TELEPHONE ENCOUNTER
lmtcb pAril calling to request a status update on orders requested, Per request it does contain medication orders as well. Please call as soon as possible to discuss thank you.

## 2021-09-24 ENCOUNTER — PATIENT OUTREACH (OUTPATIENT)
Dept: FAMILY MEDICINE CLINIC | Facility: CLINIC | Age: 71
End: 2021-09-24

## 2021-09-24 ENCOUNTER — OFFICE VISIT (OUTPATIENT)
Dept: FAMILY MEDICINE CLINIC | Facility: CLINIC | Age: 71
End: 2021-09-24
Payer: MEDICARE

## 2021-09-24 VITALS
SYSTOLIC BLOOD PRESSURE: 118 MMHG | WEIGHT: 155.8 LBS | HEART RATE: 64 BPM | HEIGHT: 70 IN | DIASTOLIC BLOOD PRESSURE: 72 MMHG | RESPIRATION RATE: 16 BRPM | BODY MASS INDEX: 22.3 KG/M2

## 2021-09-24 DIAGNOSIS — Z13.220 SCREENING FOR LIPID DISORDERS: ICD-10-CM

## 2021-09-24 DIAGNOSIS — Z13.29 SCREENING FOR THYROID DISORDER: ICD-10-CM

## 2021-09-24 DIAGNOSIS — Z12.5 SCREENING PSA (PROSTATE SPECIFIC ANTIGEN): ICD-10-CM

## 2021-09-24 DIAGNOSIS — Z00.00 MEDICARE ANNUAL WELLNESS VISIT, SUBSEQUENT: Primary | ICD-10-CM

## 2021-09-24 DIAGNOSIS — G89.29 CHRONIC BILATERAL LOW BACK PAIN WITHOUT SCIATICA: ICD-10-CM

## 2021-09-24 DIAGNOSIS — R73.9 HYPERGLYCEMIA: ICD-10-CM

## 2021-09-24 DIAGNOSIS — Z87.891 STOPPED SMOKING WITH GREATER THAN 30 PACK YEAR HISTORY: ICD-10-CM

## 2021-09-24 DIAGNOSIS — Z00.8 ROUTINE PODIATRY VISIT: ICD-10-CM

## 2021-09-24 DIAGNOSIS — F10.10 ALCOHOL ABUSE: ICD-10-CM

## 2021-09-24 DIAGNOSIS — M54.50 CHRONIC BILATERAL LOW BACK PAIN WITHOUT SCIATICA: ICD-10-CM

## 2021-09-24 DIAGNOSIS — Z12.12 SCREENING FOR COLORECTAL CANCER: ICD-10-CM

## 2021-09-24 DIAGNOSIS — D64.9 ANEMIA, UNSPECIFIED TYPE: ICD-10-CM

## 2021-09-24 DIAGNOSIS — Z11.59 NEED FOR HEPATITIS C SCREENING TEST: ICD-10-CM

## 2021-09-24 DIAGNOSIS — Z23 ENCOUNTER FOR IMMUNIZATION: ICD-10-CM

## 2021-09-24 DIAGNOSIS — Z12.11 SCREENING FOR COLORECTAL CANCER: ICD-10-CM

## 2021-09-24 DIAGNOSIS — Z13.1 SCREENING FOR DIABETES MELLITUS: ICD-10-CM

## 2021-09-24 PROCEDURE — 90670 PCV13 VACCINE IM: CPT | Performed by: NURSE PRACTITIONER

## 2021-09-24 PROCEDURE — G0438 PPPS, INITIAL VISIT: HCPCS | Performed by: NURSE PRACTITIONER

## 2021-09-24 PROCEDURE — 90662 IIV NO PRSV INCREASED AG IM: CPT | Performed by: NURSE PRACTITIONER

## 2021-09-24 PROCEDURE — 1123F ACP DISCUSS/DSCN MKR DOCD: CPT | Performed by: NURSE PRACTITIONER

## 2021-09-24 PROCEDURE — G0009 ADMIN PNEUMOCOCCAL VACCINE: HCPCS | Performed by: NURSE PRACTITIONER

## 2021-09-24 PROCEDURE — G0008 ADMIN INFLUENZA VIRUS VAC: HCPCS | Performed by: NURSE PRACTITIONER

## 2021-09-24 PROCEDURE — 99204 OFFICE O/P NEW MOD 45 MIN: CPT | Performed by: NURSE PRACTITIONER

## 2021-09-24 NOTE — PROGRESS NOTES
Assessment and Plan:     Problem List Items Addressed This Visit        Other    Low back pain     No current issues regarding this  Anemia - Primary     CBC and iron panel ordered to assess the status of this  Relevant Orders    CBC and Platelet    Iron Panel (Includes Ferritin, Iron Sat%, Iron, and TIBC)    Alcohol abuse     Patient's daughter requested referral to alcohol rehabilitation  Referral placed as well as referral to social work  Relevant Orders    Ambulatory Referral to Alcohol Rehabilitation    Ambulatory referral to social work care management program      Other Visit Diagnoses     Need for hepatitis C screening test        Relevant Orders    Hepatitis C Antibody ([facility])    Screening for colorectal cancer        Relevant Orders    Ambulatory referral to Colorectal Surgery    Screening for lipid disorders        Relevant Orders    Lipid Panel with Direct LDL reflex    Screening for diabetes mellitus        Relevant Orders    Comprehensive metabolic panel    UA w Reflex to Microscopic w Reflex to Culture -Lab Collect    HEMOGLOBIN A1C W/ EAG ESTIMATION    Screening for thyroid disorder        Relevant Orders    TSH, 3rd generation    Screening PSA (prostate specific antigen)        Relevant Orders    PSA, Total Screen    Hyperglycemia        Relevant Orders    HEMOGLOBIN A1C W/ EAG ESTIMATION    Routine podiatry visit        Relevant Orders    Ambulatory referral to Podiatry    Stopped smoking with greater than 30 pack year history        Relevant Orders    CT lung screening program           Preventive health issues were discussed with patient, and age appropriate screening tests were ordered as noted in patient's After Visit Summary  Personalized health advice and appropriate referrals for health education or preventive services given if needed, as noted in patient's After Visit Summary       History of Present Illness:     Patient presents for Medicare Annual Wellness visit    Patient Care Team:  Rufino Ordza MD as PCP - Dina Donald MD     Problem List:     Patient Active Problem List   Diagnosis    Low back pain    Anemia    Fall at home    Alcohol abuse      Past Medical and Surgical History:     Past Medical History:   Diagnosis Date    Alcohol intoxication ([address] ) 08/17/2018    Anemia     Fall     Low back pain     For 25 years     History reviewed  No pertinent surgical history  Family History:     Family History   Problem Relation Age of Onset    Hypertension Mother     Stroke Mother       Social History:     Social History     Socioeconomic History    Marital status: Single     Spouse name: None    Number of children: 1    Years of education: None    Highest education level: None   Occupational History    None   Tobacco Use    Smoking status: Never Smoker    Smokeless tobacco: Never Used    Tobacco comment: Former smoker /quit 2010 as per [employer]   Substance and Sexual Activity    Alcohol use: Yes     Alcohol/week: 4 0 standard drinks     Types: 4 Cans of beer per week     Comment: on weekends    Drug use: No    Sexual activity: None   Other Topics Concern    None   Social History Narrative    Advance directive info unavailable        One child, 2 grandchildren     Social Determinants of Health     Financial Resource Strain:     Difficulty of Paying Living Expenses:    Food Insecurity:     Worried About Running Out of Food in the Last Year:     [address] in the Last Year:    Transportation Needs:     Lack of Transportation (Medical):      Lack of Transportation (Non-Medical):    Physical Activity:     Days of Exercise per Week:     Minutes of Exercise per Session:    Stress:     Feeling of Stress :    Social Connections:     Frequency of Communication with Friends and Family:     Frequency of Social Gatherings with Friends and Family:     Attends [employer] Services:     Active Member of Clubs or Organizations:     Attends Club or Organization Meetings:     Marital Status:    Intimate Partner Violence:     Fear of Current or Ex-Partner:     Emotionally Abused:     Physically Abused:     Sexually Abused:       Medications and Allergies:     Current Outpatient Medications   Medication Sig Dispense Refill    ketotifen (ZADITOR) 0 025 % ophthalmic solution Administer 1 drop to both eyes 2 (two) times a day 5 mL 0    folic acid (FOLVITE) 1 mg tablet Take 1 tablet (1 mg total) by mouth daily (Patient not taking: Reported on 9/24/2021) 30 tablet 0    meloxicam (MOBIC) 7 5 mg tablet Take 1 tablet (7 5 mg total) by mouth daily (Patient not taking: Reported on 9/24/2021) 30 tablet 1    thiamine 100 MG tablet Take 1 tablet (100 mg total) by mouth daily (Patient not taking: Reported on 9/24/2021) 30 tablet 0     No current facility-administered medications for this visit  No Known Allergies   Immunizations:     Immunization History   Administered Date(s) Administered    Tdap 1950, 04/01/2006, 01/13/2016, 08/17/2018      Health Maintenance:         Topic Date Due    Hepatitis C Screening  Never done    Colorectal Cancer Screening  Never done         Topic Date Due    Pneumococcal Vaccine: 65+ Years (1 of 2 - PPSV23) Never done    COVID-19 Vaccine (1) Never done    Influenza Vaccine (1) 09/01/2021      Medicare Health Risk Assessment:     /72 (BP Location: Left arm, Patient Position: Sitting, Cuff Size: Standard)   Pulse 64   Resp 16   Ht 5' 10" (1 778 m)   Wt 70 7 kg (155 lb 12 8 oz)   BMI 22 35 kg/m²          Health Risk Assessment:   Patient rates overall health as good  Patient feels that their physical health rating is same  Patient is satisfied with their life  Eyesight was rated as slightly worse  Hearing was rated as same  Patient feels that their emotional and mental health rating is slightly better  Patients states they are sometimes angry   Patient states they are sometimes unusually tired/fatigued  Pain experienced in the last 7 days has been none  Patient states that he has experienced no weight loss or gain in last 6 months  Depression Screening:   PHQ-2 Score: 0      Fall Risk Screening: In the past year, patient has experienced: history of falling in past year    Number of falls: 1  Injured during fall?: No    Feels unsteady when standing or walking?: No    Worried about falling?: No      Home Safety:  Patient does not have trouble with stairs inside or outside of their home  Patient has working smoke alarms and has working carbon monoxide detector  Home safety hazards include: none  Nutrition:   Current diet is Regular  Medications:   Patient is currently taking over-the-counter supplements  OTC medications include: see medication list  Patient is able to manage medications  Activities of Daily Living (ADLs)/Instrumental Activities of Daily Living (IADLs):   Walk and transfer into and out of bed and chair?: Yes  Dress and groom yourself?: Yes    Bathe or shower yourself?: Yes    Feed yourself? Yes  Do your laundry/housekeeping?: Yes  Manage your money, pay your bills and track your expenses?: Yes  Make your own meals?: Yes    Do your own shopping?: Yes    Previous Hospitalizations:   Any hospitalizations or ED visits within the last 12 months?: No      Advance Care Planning:   Living will: No    Durable POA for healthcare:  Yes    Advanced directive: No      Cognitive Screening:   Provider or family/friend/caregiver concerned regarding cognition?: No    PREVENTIVE SCREENINGS      Cardiovascular Screening:    General: Risks and Benefits Discussed    Due for: Lipid Panel      Diabetes Screening:     General: Risks and Benefits Discussed    Due for: Blood Glucose      Colorectal Cancer Screening:     General: Risks and Benefits Discussed    Due for: Colonoscopy - Low Risk      Prostate Cancer Screening:    General: Risks and Benefits Discussed    Due for: PSA Osteoporosis Screening:    General: Screening Not Indicated      Abdominal Aortic Aneurysm (AAA) Screening:    Risk factors include: age between 73-69 yo        General: Screening Not Indicated      Lung Cancer Screening:     General: Screening Not Indicated      Hepatitis C Screening:    General: Risks and Benefits Discussed    Hep C Screening Accepted: Yes      Screening, Brief Intervention, and Referral to Treatment (SBIRT)    Screening  Typical number of drinks in a day: 2  Typical number of drinks in a week: 14  Interpretation: Low risk drinking behavior  Single Item Drug Screening:  How often have you used an illegal drug (including marijuana) or a prescription medication for non-medical reasons in the past year? never    Single Item Drug Screen Score: 0  Interpretation: Negative screen for possible drug use disorder    Other Counseling Topics:   Car/seat belt/driving safety, skin self-exam, sunscreen and calcium and vitamin D intake and regular weightbearing exercise         TOM Cohn

## 2021-09-24 NOTE — PROGRESS NOTES
BMI Counseling: Body mass index is 22 35 kg/m²  Assessment and Plan:    Problem List Items Addressed This Visit        Other    Low back pain     No current issues regarding this  Anemia - Primary     CBC and iron panel ordered to assess the status of this  Relevant Orders    CBC and Platelet    Iron Panel (Includes Ferritin, Iron Sat%, Iron, and TIBC)    Alcohol abuse     Patient's daughter requested referral to alcohol rehabilitation  Referral placed as well as referral to social work  Relevant Orders    Ambulatory Referral to Alcohol Rehabilitation    Ambulatory referral to social work care management program      Other Visit Diagnoses     Need for hepatitis C screening test        Relevant Orders    Hepatitis C Antibody ([facility])    Screening for colorectal cancer        Relevant Orders    Ambulatory referral to Colorectal Surgery    Screening for lipid disorders        Relevant Orders    Lipid Panel with Direct LDL reflex    Screening for diabetes mellitus        Relevant Orders    Comprehensive metabolic panel    UA w Reflex to Microscopic w Reflex to Culture -Lab Collect    HEMOGLOBIN A1C W/ EAG ESTIMATION    Screening for thyroid disorder        Relevant Orders    TSH, 3rd generation    Screening PSA (prostate specific antigen)        Relevant Orders    PSA, Total Screen    Hyperglycemia        Relevant Orders    HEMOGLOBIN A1C W/ EAG ESTIMATION    Routine podiatry visit        Relevant Orders    Ambulatory referral to Podiatry    Stopped smoking with greater than 30 pack year history        Relevant Orders    CT lung screening program                 Diagnoses and all orders for this visit:    Anemia, unspecified type  -     CBC and Platelet; Future  -     Iron Panel (Includes Ferritin, Iron Sat%, Iron, and TIBC); Future    Need for hepatitis C screening test  -     Hepatitis C Antibody ([facility]);  Future    Screening for colorectal cancer  -     Ambulatory referral to Colorectal Surgery; Future    Screening for lipid disorders  -     Lipid Panel with Direct LDL reflex; Future    Screening for diabetes mellitus  -     Comprehensive metabolic panel; Future  -     UA w Reflex to Microscopic w Reflex to Culture -Lab Collect; Future  -     HEMOGLOBIN A1C W/ EAG ESTIMATION; Future    Screening for thyroid disorder  -     TSH, 3rd generation; Future    Screening PSA (prostate specific antigen)  -     PSA, Total Screen; Future    Alcohol abuse  -     Ambulatory Referral to Alcohol Rehabilitation; Future  -     Ambulatory referral to social work care management program; Future    Hyperglycemia  -     HEMOGLOBIN A1C W/ EAG ESTIMATION; Future    Routine podiatry visit  -     Ambulatory referral to Podiatry; Future    Stopped smoking with greater than 30 pack year history  -     CT lung screening program; Future    Chronic bilateral low back pain without sciatica    Other orders  -     Cancel: Cologuard; Future              Subjective:      Patient ID: Vernon Courtney is a 80 y o  male  CC:    Chief Complaint   [facility]   [facility]      pt here today with daughter to [facility]  [facility]       HPI:    Alcohol abuse: The patient reports drinking 1 beer per day currently  The patient's daughter reports every few weeks he drinks more beer than usual and may get lost or confused  Anemia:  Most recent CBC was completed in October 2020 and did show decreased hemoglobin level  Patient denies any increased fatigue or cold intolerance  IFG:  Noted on most recent CMP from October 2020  Patient denies any polydipsia, polyuria, or polyphagia  COVID 19 vaccination:  Patient and his daughter advised that the patient is at increased risk of complications if he contracted COVID-19 due to his history of anemia  Therefore, patient was advised to receive the COVID vaccine as soon as possible      Chronic low back pain:  Patient states this is from an injury over 30 years ago   He denies any radiculopathy or sciatic symptoms  He reports this is well controlled with as needed use of Tylenol or NSAIDs  The following portions of the patient's history were reviewed and updated as appropriate: allergies, current medications, past family history, past medical history, past social history, past surgical history and problem list       Review of Systems   Constitutional: Negative for chills and fever  HENT: Negative for ear pain and sore throat  Eyes: Negative for pain and visual disturbance  Respiratory: Negative for cough, chest tightness, shortness of breath and wheezing  Cardiovascular: Negative for chest pain, palpitations and leg swelling  Gastrointestinal: Negative for abdominal pain, constipation, diarrhea, nausea and vomiting  Endocrine: Negative for cold intolerance and heat intolerance  Genitourinary: Negative for decreased urine volume, dysuria and hematuria  Musculoskeletal: Negative for arthralgias, back pain and myalgias  Skin: Negative for color change and rash  Allergic/Immunologic: Negative for environmental allergies  Neurological: Negative for dizziness, seizures, syncope, weakness, light-headedness, numbness and headaches  Hematological: Negative for adenopathy  Psychiatric/Behavioral: Negative for confusion  The patient is not nervous/anxious  All other systems reviewed and are negative  Data to review:       Objective:    Vitals:    09/24/21 0915   BP: 118/72   BP Location: Left arm   Patient Position: Sitting   Cuff Size: Standard   Pulse: 64   Resp: 16   Weight: 70 7 kg (155 lb 12 8 oz)   Height: 5' 10" (1 778 m)        Physical Exam  Vitals and nursing note reviewed  Constitutional:       General: He is not in acute distress  Appearance: Normal appearance  He is well-developed  He is not ill-appearing  HENT:      Head: Normocephalic and atraumatic     Eyes:      Conjunctiva/sclera: Conjunctivae normal  Cardiovascular:      Rate and Rhythm: Normal rate and regular rhythm  Pulses: Normal pulses  Carotid pulses are 2+ on the right side and 2+ on the left side  Posterior tibial pulses are 2+ on the right side and 2+ on the left side  Heart sounds: Normal heart sounds  No murmur heard  Pulmonary:      Effort: Pulmonary effort is normal  No respiratory distress  Breath sounds: Normal breath sounds  No wheezing or rhonchi  Abdominal:      General: Abdomen is flat  Bowel sounds are normal  There is no distension  Palpations: Abdomen is soft  Tenderness: There is no abdominal tenderness  There is no guarding  Musculoskeletal:         General: Normal range of motion  Cervical back: Normal range of motion and neck supple  Right lower leg: No edema  Left lower leg: No edema  Skin:     General: Skin is warm and dry  Capillary Refill: Capillary refill takes less than 2 seconds  Neurological:      General: No focal deficit present  Mental Status: He is alert and oriented to person, place, and time  Psychiatric:         Mood and Affect: Mood normal          Behavior: Behavior normal          Thought Content: Thought content normal          Judgment: Judgment normal              Depression Screening and Follow-up Plan:   Patient was screened for depression during today's encounter  They screened negative with a PHQ-2 score of 0

## 2021-09-24 NOTE — ASSESSMENT & PLAN NOTE
Patient's daughter requested referral to alcohol rehabilitation  Referral placed as well as referral to social work

## 2021-09-27 ENCOUNTER — PATIENT OUTREACH (OUTPATIENT)
Dept: FAMILY MEDICINE CLINIC | Facility: CLINIC | Age: 71
End: 2021-09-27

## 2021-09-27 ENCOUNTER — TELEPHONE (OUTPATIENT)
Dept: FAMILY MEDICINE CLINIC | Facility: CLINIC | Age: 71
End: 2021-09-27

## 2021-09-27 NOTE — TELEPHONE ENCOUNTER
PT DAUGHTER CALLED IN BECAUSE DR Antonio Calloway FIRST APPT IN OUR OFFICE IS IN DEC AND SHE NEEDS A SOONER APPT IN [place], WANTS ANOTHER REFERRAL TO A PODIATRIST

## 2021-09-27 NOTE — PROGRESS NOTES
OP CM called to pts home number again and called to dtrs cell  Dtr states pt resides with her  Pt is indep with al ADLs  Dtr drives pt to appts or he uses [employer]  Pts dtr states pt has been better with drinking  Dtr states she started giving him non alcoholic beer and he does not know the difference  Dtr given the number to [facility] [phone] and [facility] [phone] in case pt starts drinking again

## 2021-09-28 ENCOUNTER — TELEPHONE (OUTPATIENT)
Dept: FAMILY MEDICINE CLINIC | Facility: CLINIC | Age: 71
End: 2021-09-28

## 2021-09-28 DIAGNOSIS — Z00.8 ROUTINE PODIATRY VISIT: Primary | ICD-10-CM

## 2021-09-28 NOTE — TELEPHONE ENCOUNTER
PT DAUGHTER CALLING ASKING FOR A PODIATRIST THAT YOU COULD SUGGEST IN THE [place] AREA    CARLOS CAN BE REACHED [phone]

## 2021-09-28 NOTE — TELEPHONE ENCOUNTER
Pt's daughter states she wants someone else, when telling her that it all depends on the insurance but I can look up some names she hung up the phone

## 2025-04-23 ENCOUNTER — OFFICE VISIT (OUTPATIENT)
Dept: FAMILY MEDICINE CLINIC | Facility: CLINIC | Age: 75
End: 2025-04-23
Payer: MEDICARE

## 2025-04-23 ENCOUNTER — APPOINTMENT (OUTPATIENT)
Dept: LAB | Facility: CLINIC | Age: 75
End: 2025-04-23

## 2025-04-23 VITALS
HEART RATE: 85 BPM | WEIGHT: 155 LBS | SYSTOLIC BLOOD PRESSURE: 118 MMHG | BODY MASS INDEX: 22.19 KG/M2 | OXYGEN SATURATION: 98 % | DIASTOLIC BLOOD PRESSURE: 62 MMHG | HEIGHT: 70 IN

## 2025-04-23 DIAGNOSIS — R41.3 MEMORY DEFICIT: Primary | ICD-10-CM

## 2025-04-23 DIAGNOSIS — R25.1 TREMOR: ICD-10-CM

## 2025-04-23 DIAGNOSIS — R41.3 MEMORY DEFICIT: ICD-10-CM

## 2025-04-23 DIAGNOSIS — E78.00 HYPERCHOLESTEROLEMIA: ICD-10-CM

## 2025-04-23 DIAGNOSIS — Z12.5 SCREENING FOR PROSTATE CANCER: ICD-10-CM

## 2025-04-23 DIAGNOSIS — Z23 ENCOUNTER FOR IMMUNIZATION: ICD-10-CM

## 2025-04-23 DIAGNOSIS — Z11.59 NEED FOR HEPATITIS C SCREENING TEST: ICD-10-CM

## 2025-04-23 DIAGNOSIS — Z00.00 HEALTHCARE MAINTENANCE: ICD-10-CM

## 2025-04-23 DIAGNOSIS — Z12.11 SCREENING FOR COLON CANCER: ICD-10-CM

## 2025-04-23 DIAGNOSIS — D64.9 ANEMIA, UNSPECIFIED TYPE: ICD-10-CM

## 2025-04-23 DIAGNOSIS — M15.0 PRIMARY OSTEOARTHRITIS INVOLVING MULTIPLE JOINTS: ICD-10-CM

## 2025-04-23 PROBLEM — M54.50 LOW BACK PAIN: Status: RESOLVED | Noted: 2017-09-29 | Resolved: 2025-04-23
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
## 2025-04-23 PROBLEM — F10.10 ALCOHOL ABUSE: Status: RESOLVED | Noted: 2018-08-29 | Resolved: 2025-04-23

## 2025-04-23 LAB
ERYTHROCYTE [DISTWIDTH] IN BLOOD BY AUTOMATED COUNT: 13.2 % (ref 11.6–15.1)
FOLATE SERPL-MCNC: >22.3 NG/ML
HCT VFR BLD AUTO: 35.4 % (ref 36.5–49.3)
HGB BLD-MCNC: 11.6 G/DL (ref 12–17)
MCH RBC QN AUTO: 29.5 PG (ref 26.8–34.3)
MCHC RBC AUTO-ENTMCNC: 32.8 G/DL (ref 31.4–37.4)
MCV RBC AUTO: 90 FL (ref 82–98)
PLATELET # BLD AUTO: 276 THOUSANDS/UL (ref 149–390)
PMV BLD AUTO: 10.5 FL (ref 8.9–12.7)
PSA SERPL-MCNC: 2.45 NG/ML (ref 0–4)
RBC # BLD AUTO: 3.93 MILLION/UL (ref 3.88–5.62)
TSH SERPL DL<=0.05 MIU/L-ACNC: 3.56 UIU/ML (ref 0.45–4.5)
VIT B12 SERPL-MCNC: 87 PG/ML (ref 180–914)
WBC # BLD AUTO: 4.49 THOUSAND/UL (ref 4.31–10.16)

## 2025-04-23 PROCEDURE — 36415 COLL VENOUS BLD VENIPUNCTURE: CPT

## 2025-04-23 PROCEDURE — 82746 ASSAY OF FOLIC ACID SERUM: CPT

## 2025-04-23 PROCEDURE — 84443 ASSAY THYROID STIM HORMONE: CPT | Performed by: FAMILY MEDICINE

## 2025-04-23 PROCEDURE — G0103 PSA SCREENING: HCPCS

## 2025-04-23 PROCEDURE — 90677 PCV20 VACCINE IM: CPT | Performed by: FAMILY MEDICINE

## 2025-04-23 PROCEDURE — 82175 ASSAY OF ARSENIC: CPT

## 2025-04-23 PROCEDURE — 99204 OFFICE O/P NEW MOD 45 MIN: CPT | Performed by: FAMILY MEDICINE

## 2025-04-23 PROCEDURE — G0009 ADMIN PNEUMOCOCCAL VACCINE: HCPCS | Performed by: FAMILY MEDICINE

## 2025-04-23 PROCEDURE — 80061 LIPID PANEL: CPT | Performed by: FAMILY MEDICINE

## 2025-04-23 PROCEDURE — 86780 TREPONEMA PALLIDUM: CPT

## 2025-04-23 PROCEDURE — 80053 COMPREHEN METABOLIC PANEL: CPT | Performed by: FAMILY MEDICINE

## 2025-04-23 PROCEDURE — 86618 LYME DISEASE ANTIBODY: CPT

## 2025-04-23 PROCEDURE — 86803 HEPATITIS C AB TEST: CPT

## 2025-04-23 PROCEDURE — G0439 PPPS, SUBSEQ VISIT: HCPCS | Performed by: FAMILY MEDICINE

## 2025-04-23 PROCEDURE — 86592 SYPHILIS TEST NON-TREP QUAL: CPT

## 2025-04-23 PROCEDURE — 82607 VITAMIN B-12: CPT

## 2025-04-23 PROCEDURE — 85027 COMPLETE CBC AUTOMATED: CPT

## 2025-04-23 PROCEDURE — 83825 ASSAY OF MERCURY: CPT

## 2025-04-23 PROCEDURE — 83655 ASSAY OF LEAD: CPT

## 2025-04-23 RX ORDER — ACETAMINOPHEN 325 MG/1
650 TABLET ORAL EVERY 6 HOURS PRN
COMMUNITY

## 2025-04-23 NOTE — PATIENT INSTRUCTIONS
I recommend obtaining RSV and shingles vaccine at local pharmacy  Complete fasting blood work in May use Tylenol as needed for arthritis pain  Follow-up with neurology for tremors  Follow with [facility] (geriatrics) for memory loss  Gastroenterology will call and set up appointment for colonoscopy  Complete MRI of brain for memory loss  Recheck 4 weeks for office visit and MMSE      Medicare Preventive Visit Patient Instructions  Thank you for completing your Welcome to Medicare Visit or Medicare Annual Wellness Visit today. Your next wellness visit will be due in one year (4/24/2026).  The screening/preventive services that you may require over the next 5-10 years are detailed below. Some tests may not apply to you based off risk factors and/or age. Screening tests ordered at today's visit but not completed yet may show as past due. Also, please note that scanned in results may not display below.  Preventive Screenings:  Service Recommendations Previous Testing/Comments   Colorectal Cancer Screening  Colonoscopy    Fecal Occult Blood Test (FOBT)/Fecal Immunochemical Test (FIT)  Fecal DNA/Cologuard Test  Flexible Sigmoidoscopy Age: 45-75 years old   Colonoscopy: every 10 years (May be performed more frequently if at higher risk)  OR  FOBT/FIT: every 1 year  OR  Cologuard: every 3 years  OR  Sigmoidoscopy: every 5 years  Screening may be recommended earlier than age 45 if at higher risk for colorectal cancer. Also, an individualized decision between you and your healthcare provider will decide whether screening between the ages of 76-85 would be appropriate. Colonoscopy: Not on file  FOBT/FIT: Not on file  Cologuard: Not on file  Sigmoidoscopy: Not on file    Risks and Benefits Discussed  Due for Cologuard     Prostate Cancer Screening Individualized decision between patient and health care provider in men between ages of 55-69   Medicare will cover every 12 months beginning on the day after your 50th birthday PSA:  No results in last 5 years     Risks and Benefits Discussed  Due for PSA     Hepatitis C Screening Once for adults born between 1945 and 1965  More frequently in patients at high risk for Hepatitis C Hep C Antibody: Not on file    Risks and Benefits Discussed  History Hepatitis C   Diabetes Screening 1-2 times per year if you're at risk for diabetes or have pre-diabetes Fasting glucose: No results in last 5 years (No results in last 5 years)  A1C: No results in last 5 years (No results in last 5 years)  Risks and Benefits Discussed  Due for Blood Glucose   Cholesterol Screening Once every 5 years if you don't have a lipid disorder. May order more often based on risk factors. Lipid panel: Not on file  Screening Not Indicated  History Lipid Disorder  Risks and Benefits Discussed  Due for Lipid Panel      Other Preventive Screenings Covered by Medicare:  Abdominal Aortic Aneurysm (AAA) Screening: covered once if your at risk. You're considered to be at risk if you have a family history of AAA or a male between the age of 65-75 who smoking at least 100 cigarettes in your lifetime.  Lung Cancer Screening: covers low dose CT scan once per year if you meet all of the following conditions: (1) Age 55-77; (2) No signs or symptoms of lung cancer; (3) Current smoker or have quit smoking within the last 15 years; (4) You have a tobacco smoking history of at least 20 pack years (packs per day x number of years you smoked); (5) You get a written order from a healthcare provider.  Glaucoma Screening: covered annually if you're considered high risk: (1) You have diabetes OR (2) Family history of glaucoma OR (3)  aged 50 and older OR (4)  American aged 65 and older  Osteoporosis Screening: covered every 2 years if you meet one of the following conditions: (1) Have a vertebral abnormality; (2) On glucocorticoid therapy for more than 3 months; (3) Have primary hyperparathyroidism; (4) On osteoporosis  medications and need to assess response to drug therapy.  HIV Screening: covered annually if you're between the age of 15-65. Also covered annually if you are younger than 15 and older than 65 with risk factors for HIV infection. For pregnant patients, it is covered up to 3 times per pregnancy.    Immunizations:  Immunization Recommendations   Influenza Vaccine Annual influenza vaccination during flu season is recommended for all persons aged >= 6 months who do not have contraindications   Pneumococcal Vaccine   * Pneumococcal conjugate vaccine = PCV13 (Prevnar 13), PCV15 (Vaxneuvance), PCV20 (Prevnar 20)  * Pneumococcal polysaccharide vaccine = PPSV23 (Pneumovax) Adults 19-65 yo with certain risk factors or if 65+ yo  If never received any pneumonia vaccine: recommend Prevnar 20 (PCV20)  Give PCV20 if previously received 1 dose of PCV13 or PPSV23   Hepatitis B Vaccine 3 dose series if at intermediate or high risk (ex: diabetes, end stage renal disease, liver disease)   Respiratory syncytial virus (RSV) Vaccine - COVERED BY MEDICARE PART D  * RSVPreF3 (Arexvy) CDC recommends that adults 60 years of age and older may receive a single dose of RSV vaccine using shared clinical decision-making (SCDM)   Tetanus (Td) Vaccine - COST NOT COVERED BY MEDICARE PART B Following completion of primary series, a booster dose should be given every 10 years to maintain immunity against tetanus. Td may also be given as tetanus wound prophylaxis.   Tdap Vaccine - COST NOT COVERED BY MEDICARE PART B Recommended at least once for all adults. For pregnant patients, recommended with each pregnancy.   Shingles Vaccine (Shingrix) - COST NOT COVERED BY MEDICARE PART B  2 shot series recommended in those 19 years and older who have or will have weakened immune systems or those 50 years and older     Health Maintenance Due:      Topic Date Due    Hepatitis C Screening  Never done    Colorectal Cancer Screening  Never done     Immunizations  Due:      Topic Date Due    Pneumococcal Vaccine: 65+ Years (2 of 2 - PPSV23) 11/19/2021    Influenza Vaccine (1) 09/01/2024    COVID-19 Vaccine (1 - 2024-25 season) Never done     Advance Directives   What are advance directives?  Advance directives are legal documents that state your wishes and plans for medical care. These plans are made ahead of time in case you lose your ability to make decisions for yourself. Advance directives can apply to any medical decision, such as the treatments you want, and if you want to donate organs.   What are the types of advance directives?  There are many types of advance directives, and each state has rules about how to use them. You may choose a combination of any of the following:  Living will:  This is a written record of the treatment you want. You can also choose which treatments you do not want, which to limit, and which to stop at a certain time. This includes surgery, medicine, IV fluid, and tube feedings.   Durable power of  for healthcare (DPAHC):  This is a written record that states who you want to make healthcare choices for you when you are unable to make them for yourself. This person, called a proxy, is usually a family member or a friend. You may choose more than 1 proxy.  Do not resuscitate (DNR) order:  A DNR order is used in case your heart stops beating or you stop breathing. It is a request not to have certain forms of treatment, such as CPR. A DNR order may be included in other types of advance directives.  Medical directive:  This covers the care that you want if you are in a coma, near death, or unable to make decisions for yourself. You can list the treatments you want for each condition. Treatment may include pain medicine, surgery, blood transfusions, dialysis, IV or tube feedings, and a ventilator (breathing machine).  Values history:  This document has questions about your views, beliefs, and how you feel and think about life. This  information can help others choose the care that you would choose.  Why are advance directives important?  An advance directive helps you control your care. Although spoken wishes may be used, it is better to have your wishes written down. Spoken wishes can be misunderstood, or not followed. Treatments may be given even if you do not want them. An advance directive may make it easier for your family to make difficult choices about your care.       © Copyright [employer] 2018 Information is for End User's use only and may not be sold, redistributed or otherwise used for commercial purposes. All illustrations and images included in CareNotes® are the copyrighted property of [employer] or [employer]

## 2025-04-23 NOTE — ASSESSMENT & PLAN NOTE
Blood work ordered, neuro quant ordered refer to Senior care  Will have patient return in 4 weeks and attempt Mini-Mental status    Orders:  •  Ambulatory Referral to [facility]; Future  •  CBC; Future  •  Comprehensive metabolic panel  •  Lipid Panel with Direct LDL reflex  •  TSH, 3rd generation with Free T4 reflex  •  UA (URINE) with reflex to Scope; Future  •  PSA, Total Screen; Future  •  Vitamin B12; Future  •  Folate; Future  •  Lyme Total AB W Reflex to IGM/IGG; Future  •  RPR, Rfx Qn RPR/Confirm TP; Future  •  Heavy metals, blood; Future  •  MRI brain NeuroQuant wo and w contrast; Future

## 2025-04-23 NOTE — ASSESSMENT & PLAN NOTE
Blood work ordered  Orders:  •  CBC; Future  •  Comprehensive metabolic panel  •  Lipid Panel with Direct LDL reflex  •  TSH, 3rd generation with Free T4 reflex  •  UA (URINE) with reflex to Scope; Future  •  PSA, Total Screen; Future  •  Vitamin B12; Future  •  Folate; Future  •  Lyme Total AB W Reflex to IGM/IGG; Future  •  RPR, Rfx Qn RPR/Confirm TP; Future  •  Heavy metals, blood; Future

## 2025-04-23 NOTE — ASSESSMENT & PLAN NOTE
PSA is ordered  Orders:  •  CBC; Future  •  Comprehensive metabolic panel  •  Lipid Panel with Direct LDL reflex  •  TSH, 3rd generation with Free T4 reflex  •  UA (URINE) with reflex to Scope; Future  •  PSA, Total Screen; Future  •  Vitamin B12; Future  •  Folate; Future  •  Lyme Total AB W Reflex to IGM/IGG; Future  •  RPR, Rfx Qn RPR/Confirm TP; Future  •  Heavy metals, blood; Future

## 2025-04-23 NOTE — ASSESSMENT & PLAN NOTE
Patient average risk with no family history.  Discussed colonoscopy versus Cologuard.  Patient and family chose colonoscopy referred to GI  Orders:  •  Ambulatory Referral to Gastroenterology; Future  •  CBC; Future  •  Comprehensive metabolic panel  •  Lipid Panel with Direct LDL reflex  •  TSH, 3rd generation with Free T4 reflex  •  UA (URINE) with reflex to Scope; Future  •  PSA, Total Screen; Future  •  Vitamin B12; Future  •  Folate; Future  •  Lyme Total AB W Reflex to IGM/IGG; Future  •  RPR, Rfx Qn RPR/Confirm TP; Future  •  Heavy metals, blood; Future

## 2025-04-23 NOTE — ASSESSMENT & PLAN NOTE
AWV completed, hepatitis C screening discussed and ordered  Prevnar 20 given in office  Patient told to get RSV and shingles vaccination at local pharmacy

## 2025-04-23 NOTE — PROGRESS NOTES
Name: Mo Berry      : 1950      MRN: [id number]  Encounter Provider: Selvin Dumont DO  Encounter Date: 2025   Encounter department: [facility]  :  Assessment & Plan  Encounter for immunization    Orders:  •  Pneumococcal Conjugate Vaccine 20-valent (Pcv20)    Memory deficit  Blood work ordered, neuro quant ordered refer to Senior care  Will have patient return in 4 weeks and attempt Mini-Mental status    Orders:  •  Ambulatory Referral to Senior Care; Future  •  CBC; Future  •  Comprehensive metabolic panel  •  Lipid Panel with Direct LDL reflex  •  TSH, 3rd generation with Free T4 reflex  •  UA (URINE) with reflex to Scope; Future  •  PSA, Total Screen; Future  •  Vitamin B12; Future  •  Folate; Future  •  Lyme Total AB W Reflex to IGM/IGG; Future  •  RPR, Rfx Qn RPR/Confirm TP; Future  •  Heavy metals, blood; Future  •  MRI brain NeuroQuant wo and w contrast; Future    Tremor  Consult neurology  Orders:  •  Ambulatory Referral to Neurology; Future  •  CBC; Future  •  Comprehensive metabolic panel  •  Lipid Panel with Direct LDL reflex  •  TSH, 3rd generation with Free T4 reflex  •  UA (URINE) with reflex to Scope; Future  •  PSA, Total Screen; Future  •  Vitamin B12; Future  •  Folate; Future  •  Lyme Total AB W Reflex to IGM/IGG; Future  •  RPR, Rfx Qn RPR/Confirm TP; Future  •  Heavy metals, blood; Future    Healthcare maintenance  AWV completed, hepatitis C screening discussed and ordered  Prevnar 20 given in office  Patient told to get RSV and shingles vaccination at local pharmacy         Hypercholesterolemia  Blood work ordered  Orders:  •  CBC; Future  •  Comprehensive metabolic panel  •  Lipid Panel with Direct LDL reflex  •  TSH, 3rd generation with Free T4 reflex  •  UA (URINE) with reflex to Scope; Future  •  PSA, Total Screen; Future  •  Vitamin B12; Future  •  Folate; Future  •  Lyme Total AB W Reflex to IGM/IGG; Future  •  RPR, Rfx Qn RPR/Confirm TP;  Future  •  Heavy metals, blood; Future    Anemia, unspecified type    Blood work ordered  Orders:  •  CBC; Future  •  Comprehensive metabolic panel  •  Lipid Panel with Direct LDL reflex  •  TSH, 3rd generation with Free T4 reflex  •  UA (URINE) with reflex to Scope; Future  •  PSA, Total Screen; Future  •  Vitamin B12; Future  •  Folate; Future  •  Lyme Total AB W Reflex to IGM/IGG; Future  •  RPR, Rfx Qn RPR/Confirm TP; Future  •  Heavy metals, blood; Future    Screening for colon cancer  Patient average risk with no family history.  Discussed colonoscopy versus Cologuard.  Patient and family chose colonoscopy referred to GI  Orders:  •  Ambulatory Referral to Gastroenterology; Future  •  CBC; Future  •  Comprehensive metabolic panel  •  Lipid Panel with Direct LDL reflex  •  TSH, 3rd generation with Free T4 reflex  •  UA (URINE) with reflex to Scope; Future  •  PSA, Total Screen; Future  •  Vitamin B12; Future  •  Folate; Future  •  Lyme Total AB W Reflex to IGM/IGG; Future  •  RPR, Rfx Qn RPR/Confirm TP; Future  •  Heavy metals, blood; Future    Screening for prostate cancer  PSA is ordered  Orders:  •  CBC; Future  •  Comprehensive metabolic panel  •  Lipid Panel with Direct LDL reflex  •  TSH, 3rd generation with Free T4 reflex  •  UA (URINE) with reflex to Scope; Future  •  PSA, Total Screen; Future  •  Vitamin B12; Future  •  Folate; Future  •  Lyme Total AB W Reflex to IGM/IGG; Future  •  RPR, Rfx Qn RPR/Confirm TP; Future  •  Heavy metals, blood; Future    Need for hepatitis C screening test    Orders:  •  Hepatitis C Antibody; Future  •  CBC; Future  •  Comprehensive metabolic panel  •  Lipid Panel with Direct LDL reflex  •  TSH, 3rd generation with Free T4 reflex  •  UA (URINE) with reflex to Scope; Future  •  PSA, Total Screen; Future  •  Vitamin B12; Future  •  Folate; Future  •  Lyme Total AB W Reflex to IGM/IGG; Future  •  RPR, Rfx Qn RPR/Confirm TP; Future  •  Heavy metals, blood; Future    Primary  osteoarthritis involving multiple joints  I recommend Tylenol as needed         Depression Screening and Follow-up Plan: Patient was screened for depression during today's encounter. They screened negative with a PHQ-2 score of 2.        Preventive health issues were discussed with patient, and age appropriate screening tests were ordered as noted in patient's After Visit Summary. Personalized health advice and appropriate referrals for health education or preventive services given if needed, as noted in patient's After Visit Summary.    History of Present Illness     Patient was brought in by his daughter.  Is not seeing any physician or medical care for the past almost 4 years.  Daughter is worried he has severe memory issues and tremors.  Patient also having some arthritis pain no medication was taken       Patient Care Team:  Selvin Dumont DO as PCP - General (Family Medicine)  Tarsha Cornejo MD    Review of Systems   Constitutional: Negative.    HENT: Negative.     Eyes: Negative.    Respiratory: Negative.     Cardiovascular: Negative.    Gastrointestinal: Negative.    Endocrine: Negative.    Genitourinary: Negative.    Musculoskeletal:         HPI   Skin: Negative.    Allergic/Immunologic: Negative.    Neurological:         HPI   Hematological: Negative.    Psychiatric/Behavioral: Negative.       Medical History Reviewed by provider this encounter:  Tobacco  Allergies  Meds  Problems  Med Hx  Surg Hx  Fam Hx       Annual Wellness Visit Questionnaire   Mo is here for his Subsequent Wellness visit.     Health Risk Assessment:   Patient rates overall health as poor. Patient feels that their physical health rating is much worse. Patient is satisfied with their life. Eyesight was rated as slightly worse. Hearing was rated as same. Patient feels that their emotional and mental health rating is much worse. Patient states they are never, rarely unusually tired/fatigued. Pain experienced in the last 7  days has been some. Patient's pain rating has been 8/10. Patient states that he has experienced weight loss or gain in last 6 months.     Depression Screening:   PHQ-2 Score: 2      Fall Risk Screening:   In the past year, patient has experienced: no history of falling in past year      Home Safety:  Patient has trouble with stairs inside or outside of their home. Patient has working smoke alarms and has working carbon monoxide detector. Home safety hazards include: none.     Nutrition:   Current diet is Regular.     Medications:   Patient is not currently taking any over-the-counter supplements. Patient is not able to manage medications.     Activities of Daily Living (ADLs)/Instrumental Activities of Daily Living (IADLs):   Walk and transfer into and out of bed and chair?: Yes  Dress and groom yourself?: No    Bathe or shower yourself?: No    Feed yourself? Yes  Do your laundry/housekeeping?: No  Manage your money, pay your bills and track your expenses?: No  Make your own meals?: No    Do your own shopping?: No    Previous Hospitalizations:   Any hospitalizations or ED visits within the last 12 months?: No      Advance Care Planning:   Living will: No    Durable POA for healthcare: Yes    Advanced directive: No    Advanced directive counseling given: Yes    ACP document given: Yes    Patient declined ACP directive: No    End of Life Decisions reviewed with patient: Yes    Provider agrees with end of life decisions: Yes      Cognitive Screening:   Provider or family/friend/caregiver concerned regarding cognition?: No    Preventive Screenings      Cardiovascular Screening:    General: Screening Not Indicated, History Lipid Disorder and Risks and Benefits Discussed    Due for: Lipid Panel      Diabetes Screening:     General: Risks and Benefits Discussed    Due for: Blood Glucose      Colorectal Cancer Screening:     General: Risks and Benefits Discussed    Due for: Cologuard      Prostate Cancer Screening:     "General: Risks and Benefits Discussed    Due for: PSA      Osteoporosis Screening:    General: Screening Not Indicated      Abdominal Aortic Aneurysm (AAA) Screening:    Risk factors include: age between 65-76 yo and tobacco use        General: Screening Not Indicated      Lung Cancer Screening:     General: Screening Not Indicated      Hepatitis C Screening:    General: Risks and Benefits Discussed and History Hepatitis C    Immunizations:  - Immunizations due: Influenza, Prevnar 20 and Zoster (Shingrix)    Screening, Brief Intervention, and Referral to Treatment (SBIRT)     Screening  Typical number of drinks in a day: 0  Typical number of drinks in a week: 0  Interpretation: Low risk drinking behavior.    Single Item Drug Screening:  How often have you used an illegal drug (including marijuana) or a prescription medication for non-medical reasons in the past year? never    Single Item Drug Screen Score: 0  Interpretation: Negative screen for possible drug use disorder    Social Drivers of Health     Food Insecurity: No Food Insecurity (4/23/2025)    Hunger Vital Sign    • Worried About Running Out of Food in the Last Year: Never true    • Ran Out of Food in the Last Year: Never true   Transportation Needs: No Transportation Needs (4/23/2025)    PRAPARE - Transportation    • Lack of Transportation (Medical): No    • Lack of Transportation (Non-Medical): No   Housing Stability: Low Risk  (4/23/2025)    Housing Stability Vital Sign    • Unable to Pay for Housing in the Last Year: No    • Number of Times Moved in the Last Year: 0    • Homeless in the Last Year: No   Utilities: Not At Risk (4/23/2025)    [facility] Utilities    • Threatened with loss of utilities: No     No results found.    Objective   /62 (BP Location: Right arm, Patient Position: Sitting, Cuff Size: Standard)   Pulse 85   Ht 5' 10\" (1.778 m)   Wt 70.3 kg (155 lb)   SpO2 98%   BMI 22.24 kg/m²     Physical Exam  Vitals and nursing note reviewed. "   Constitutional:       Appearance: Normal appearance.   HENT:      Head: Normocephalic and atraumatic.      Right Ear: Tympanic membrane normal.      Left Ear: Tympanic membrane normal.      Nose: Nose normal.      Mouth/Throat:      Mouth: Mucous membranes are moist.      Pharynx: Oropharynx is clear. No oropharyngeal exudate or posterior oropharyngeal erythema.   Eyes:      General: No scleral icterus.  Neck:      Vascular: No carotid bruit.   Cardiovascular:      Rate and Rhythm: Normal rate and regular rhythm.      Heart sounds: Normal heart sounds.   Pulmonary:      Effort: Pulmonary effort is normal.      Breath sounds: Normal breath sounds.   Abdominal:      General: Bowel sounds are normal.      Palpations: Abdomen is soft.      Tenderness: There is no abdominal tenderness.   Musculoskeletal:      Cervical back: Neck supple.      Right lower leg: No edema.      Left lower leg: No edema.   Skin:     General: Skin is warm and dry.   Neurological:      General: No focal deficit present.      Mental Status: He is alert.      Comments: Oriented to person only positive resting tremors   Psychiatric:         Mood and Affect: Mood normal.

## 2025-04-23 NOTE — ASSESSMENT & PLAN NOTE
Consult neurology  Orders:  •  Ambulatory Referral to Neurology; Future  •  CBC; Future  •  Comprehensive metabolic panel  •  Lipid Panel with Direct LDL reflex  •  TSH, 3rd generation with Free T4 reflex  •  UA (URINE) with reflex to Scope; Future  •  PSA, Total Screen; Future  •  Vitamin B12; Future  •  Folate; Future  •  Lyme Total AB W Reflex to IGM/IGG; Future  •  RPR, Rfx Qn RPR/Confirm TP; Future  •  Heavy metals, blood; Future

## 2025-04-24 ENCOUNTER — TELEPHONE (OUTPATIENT)
Age: 75
End: 2025-04-24

## 2025-04-24 ENCOUNTER — RESULTS FOLLOW-UP (OUTPATIENT)
Dept: FAMILY MEDICINE CLINIC | Facility: CLINIC | Age: 75
End: 2025-04-24

## 2025-04-24 DIAGNOSIS — R25.1 TREMOR: ICD-10-CM

## 2025-04-24 DIAGNOSIS — A53.0 POSITIVE RPR TEST: ICD-10-CM

## 2025-04-24 DIAGNOSIS — R41.3 MEMORY DEFICIT: ICD-10-CM

## 2025-04-24 DIAGNOSIS — E53.8 B12 DEFICIENCY: Primary | ICD-10-CM

## 2025-04-24 LAB
ALBUMIN SERPL BCG-MCNC: 4.3 G/DL (ref 3.5–5)
ALP SERPL-CCNC: 81 U/L (ref 34–104)
ALT SERPL W P-5'-P-CCNC: 13 U/L (ref 7–52)
ANION GAP SERPL CALCULATED.3IONS-SCNC: 9 MMOL/L (ref 4–13)
AST SERPL W P-5'-P-CCNC: 22 U/L (ref 13–39)
B BURGDOR IGG+IGM SER QL IA: NEGATIVE
BILIRUB SERPL-MCNC: 0.56 MG/DL (ref 0.2–1)
BUN SERPL-MCNC: 27 MG/DL (ref 5–25)
CALCIUM SERPL-MCNC: 9.5 MG/DL (ref 8.4–10.2)
CHLORIDE SERPL-SCNC: 101 MMOL/L (ref 96–108)
CHOLEST SERPL-MCNC: 207 MG/DL (ref ?–200)
CO2 SERPL-SCNC: 26 MMOL/L (ref 21–32)
CREAT SERPL-MCNC: 2.07 MG/DL (ref 0.6–1.3)
GFR SERPL CREATININE-BSD FRML MDRD: 30 ML/MIN/1.73SQ M
GLUCOSE SERPL-MCNC: 101 MG/DL (ref 65–140)
HCV AB SER QL: NORMAL
HDLC SERPL-MCNC: 59 MG/DL
LDLC SERPL CALC-MCNC: 138 MG/DL (ref 0–100)
POTASSIUM SERPL-SCNC: 4.4 MMOL/L (ref 3.5–5.3)
PROT SERPL-MCNC: 7.5 G/DL (ref 6.4–8.4)
RPR SER QL: NORMAL
SODIUM SERPL-SCNC: 136 MMOL/L (ref 135–147)
TREPONEMA PALLIDUM IGG+IGM AB [PRESENCE] IN SERUM OR PLASMA BY IMMUNOASSAY: REACTIVE
TRIGL SERPL-MCNC: 49 MG/DL (ref ?–150)

## 2025-04-24 RX ORDER — LANOLIN ALCOHOL/MO/W.PET/CERES
1000 CREAM (GRAM) TOPICAL DAILY
COMMUNITY

## 2025-04-24 RX ORDER — CYANOCOBALAMIN 1000 UG/ML
1000 INJECTION, SOLUTION INTRAMUSCULAR; SUBCUTANEOUS WEEKLY
Status: SHIPPED | OUTPATIENT
Start: 2025-04-24 | End: 2025-05-22

## 2025-04-24 NOTE — TELEPHONE ENCOUNTER
Pt daughter called to schedule her father with ID , I advise Karime patient referral has to be triage , once its triage will contact with the next following steps

## 2025-04-24 NOTE — TELEPHONE ENCOUNTER
Patient's daughter called regarding phone number to schedule referral to Infectious disease. Provided [phone].

## 2025-04-25 LAB — T PALLIDUM AB SER QL AGGL: REACTIVE

## 2025-04-29 ENCOUNTER — TELEPHONE (OUTPATIENT)
Age: 75
End: 2025-04-29

## 2025-04-29 ENCOUNTER — CLINICAL SUPPORT (OUTPATIENT)
Dept: FAMILY MEDICINE CLINIC | Facility: CLINIC | Age: 75
End: 2025-04-29
Payer: MEDICARE

## 2025-04-29 DIAGNOSIS — E53.8 B12 DEFICIENCY: Primary | ICD-10-CM

## 2025-04-29 PROCEDURE — 96372 THER/PROPH/DIAG INJ SC/IM: CPT

## 2025-04-29 RX ADMIN — CYANOCOBALAMIN 1000 MCG: 1000 INJECTION, SOLUTION INTRAMUSCULAR; SUBCUTANEOUS at 14:50

## 2025-04-29 NOTE — TELEPHONE ENCOUNTER
Patient's daughter calling to schedule consultation with ID.  Patient states the referral was received 6 days ago, and she is very concerned she didn't hear from anyone.  Daughter is asking for a call back today    (Phone) [phone]

## 2025-04-30 LAB
ARSENIC BLD-MCNC: 1 UG/L (ref 0–9)
LEAD BLD-MCNC: 1.8 UG/DL (ref 0–3.4)
MERCURY BLD-MCNC: <1 UG/L (ref 0–14.9)

## 2025-04-30 NOTE — TELEPHONE ENCOUNTER
Per ID office ok to schedule. If pt or daughter call back schedule with any provider.      The [facility] of Health do evaluations and treatments that are free, and they have a well-defined treatment and follow-up process in place.    Are you diagnosed with Neurosyphilis? no    If, YES- schedule in the office within 3-4 weeks  If, NO- can still schedule appointment (see scripting) if patient wants appointment- can schedule in 3-4 weeks    Are you diagnosed with HIV? no    If, YES- schedule in the office within 3-4 weeks  If, NO- can still schedule appointment (see scripting) if patient wants appointment- can schedule in 3-4 weeks    Are there new neurologic deficits? no    If YES, send to ED.  If, NO- can still schedule appointment (see scripting) if patient wants appointment- can schedule in 3-4 weeks    Are there any new ocular deficits? no    If YES, send to ED.  If NO, recommend [facility] for treatment (see scripting)    If patient does not wish to contact [facility] and asymptomatic, referring provider should place E consult

## 2025-05-01 NOTE — TELEPHONE ENCOUNTER
Reached out to patient. Unfortunately, patient was scheduled with a PA by [facility]. For this consult, a provider will need to see. Left a message for patient/dtr to CB to scheduled with a physician. Patient contact and emergency contact are the same number.

## 2025-05-01 NOTE — TELEPHONE ENCOUNTER
Reached out once more to try and reschedule with a physician. Offered a few appointments and there were scheduling conflicts for her, however ultimately was able to offer 5/23 at 10 or 11 with Dr. Marvin. She said she is driving and doesn't have access to her calendar. She is going to call us back.

## 2025-05-02 NOTE — TELEPHONE ENCOUNTER
I called and spoke to Lily about changing her fathers appt. She agreed to 5/16 at 10:00 in [place] office with Dr. Almonte.

## 2025-05-12 ENCOUNTER — TELEPHONE (OUTPATIENT)
Dept: INFECTIOUS DISEASES | Facility: CLINIC | Age: 75
End: 2025-05-12

## 2025-05-12 NOTE — TELEPHONE ENCOUNTER
Received message from Dr. Almonte today.   Per Dr. Almonte please reach out to [facility] and see if they have previous records on testing or treatments for patient prior to 4/23/25.     Called [facility] and spoke with Agnes mcneal.   Informed Agnes I was calling see if they have previous records on testing or treatments for patient prior to 4/23/25. Agnes states they have nothing prior to 4/23/25 as that is the only results they have.

## 2025-05-13 ENCOUNTER — NURSE TRIAGE (OUTPATIENT)
Age: 75
End: 2025-05-13

## 2025-05-13 NOTE — TELEPHONE ENCOUNTER
"Daughter reports that patient has been occasionally incontinent of urine for a few years but in the past week it has been constant. No other S/S of UTI. She would like to know if he needs a urology referral. Explained that he should be seen in case he has a UTI. Has a B12 shot scheduled for mehnaz. OV scheduled before his B12 appt.         Reason for Disposition   Patient wants to be seen    Answer Assessment - Initial Assessment Questions  1. SYMPTOM: \"What's the main symptom you're concerned about?\" (e.g., frequency, incontinence)      Incontinence  2. ONSET: \"When did the  incontinenece  start?\"     Daily x 1 week    3. PAIN: \"Is there any pain?\" If Yes, ask: \"How bad is it?\" (Scale: 1-10; mild, moderate, severe)      no  4. CAUSE: \"What do you think is causing the symptoms?\"      Incont for a few years but in the last week it has been constant  5. OTHER SYMPTOMS: \"Do you have any other symptoms?\" (e.g., blood in urine, fever, flank pain, pain with urination)      No other symptoms    Protocols used: Urinary Symptoms-Adult-[place]    "

## 2025-05-14 ENCOUNTER — TELEPHONE (OUTPATIENT)
Age: 75
End: 2025-05-14

## 2025-05-14 ENCOUNTER — OFFICE VISIT (OUTPATIENT)
Dept: FAMILY MEDICINE CLINIC | Facility: CLINIC | Age: 75
End: 2025-05-14
Payer: MEDICARE

## 2025-05-14 VITALS
TEMPERATURE: 97.8 F | RESPIRATION RATE: 14 BRPM | HEIGHT: 70 IN | HEART RATE: 58 BPM | SYSTOLIC BLOOD PRESSURE: 104 MMHG | BODY MASS INDEX: 21.7 KG/M2 | DIASTOLIC BLOOD PRESSURE: 58 MMHG | WEIGHT: 151.6 LBS | OXYGEN SATURATION: 98 %

## 2025-05-14 DIAGNOSIS — R32 URINARY INCONTINENCE, UNSPECIFIED TYPE: Primary | ICD-10-CM

## 2025-05-14 DIAGNOSIS — E53.8 B12 DEFICIENCY: ICD-10-CM

## 2025-05-14 DIAGNOSIS — A53.0 POSITIVE RPR TEST: ICD-10-CM

## 2025-05-14 DIAGNOSIS — D63.8 ANEMIA OF CHRONIC DISEASE: ICD-10-CM

## 2025-05-14 DIAGNOSIS — R41.3 MEMORY DEFICIT: ICD-10-CM

## 2025-05-14 DIAGNOSIS — N18.32 CHRONIC KIDNEY DISEASE, STAGE 3B (HCC): ICD-10-CM

## 2025-05-14 PROBLEM — N18.6 ANEMIA DUE TO CHRONIC KIDNEY DISEASE, ON CHRONIC DIALYSIS (HCC): Status: RESOLVED | Noted: 2025-05-14 | Resolved: 2025-05-14

## 2025-05-14 PROBLEM — Z99.2 ANEMIA DUE TO CHRONIC KIDNEY DISEASE, ON CHRONIC DIALYSIS (HCC): Status: ACTIVE | Noted: 2025-05-14

## 2025-05-14 PROBLEM — D63.1 ANEMIA DUE TO CHRONIC KIDNEY DISEASE, ON CHRONIC DIALYSIS (HCC): Status: ACTIVE | Noted: 2025-05-14

## 2025-05-14 PROBLEM — D63.1 ANEMIA DUE TO CHRONIC KIDNEY DISEASE, ON CHRONIC DIALYSIS (HCC): Status: RESOLVED | Noted: 2025-05-14 | Resolved: 2025-05-14

## 2025-05-14 PROBLEM — Z99.2 ANEMIA DUE TO CHRONIC KIDNEY DISEASE, ON CHRONIC DIALYSIS (HCC): Status: RESOLVED | Noted: 2025-05-14 | Resolved: 2025-05-14

## 2025-05-14 PROBLEM — N18.6 ANEMIA DUE TO CHRONIC KIDNEY DISEASE, ON CHRONIC DIALYSIS (HCC): Status: ACTIVE | Noted: 2025-05-14

## 2025-05-14 PROBLEM — D64.9 ANEMIA: Status: RESOLVED | Noted: 2017-10-05 | Resolved: 2025-05-14

## 2025-05-14 PROCEDURE — 96372 THER/PROPH/DIAG INJ SC/IM: CPT | Performed by: FAMILY MEDICINE

## 2025-05-14 PROCEDURE — 99214 OFFICE O/P EST MOD 30 MIN: CPT | Performed by: FAMILY MEDICINE

## 2025-05-14 RX ORDER — INCONTINENCE PAD,LINER,DISP
EACH MISCELLANEOUS
Qty: 150 EACH | Refills: 3 | Status: SHIPPED | OUTPATIENT
Start: 2025-05-14

## 2025-05-14 RX ADMIN — CYANOCOBALAMIN 1000 MCG: 1000 INJECTION, SOLUTION INTRAMUSCULAR; SUBCUTANEOUS at 13:36

## 2025-05-14 NOTE — PATIENT INSTRUCTIONS
Patient will drop urine off at lab  Increase p.o. fluids  Check BMP next week  Return at scheduled appointment  Complete B12 injections weekly for the next 4 weeks

## 2025-05-14 NOTE — PROGRESS NOTES
Name: Mo Berry      : 1950      MRN: [id number]  Encounter Provider: Selvin Dumont DO  Encounter Date: 2025   Encounter department: [facility]  Return at scheduled appointment  :  Assessment & Plan  Urinary incontinence, unspecified type  Patient's daughter wanted him to see urologist, ordered, patient will complete urinalysis and drop at the lab.  Patient's PSA was normal.  Daughter would like him to have depends, ordered I gave daughter a hat and a urine container she will obtain a urine and drop it off at the lab  Orders:    Incontinence Supply Disposable (Depend Undergarments) MISC; Use every 4-6 hours as needed    Ambulatory Referral to Urology; Future    Chronic kidney disease, stage 3b (HCC)  Lab Results   Component Value Date    EGFR 30 2025    EGFR Not performed when sex is unknown. 10/28/2020    EGFR 79 2018    EGFR 82 2018    CREATININE 2.07 (H) 2025    CREATININE 1.38 (H) 10/28/2020    CREATININE 1.11 2018    CREATININE 1.08 2018   Increase p.o. fluids repeat BMP in 1 week  Orders:    Basic metabolic panel; Future    Anemia of chronic disease    CBC is stable continue to monitor       Memory deficit  Patient upcoming appointment with geriatrics.  MRI has been scheduled.       B12 deficiency  Patient receiving B12 1000 mcg IM for 4 weeks       Positive RPR test  Patient is upcoming appoint with infectious disease question if new or old, question neurosyphilis              History of Present Illness   Per daughter patient is getting incontinent of urine over the past few weeks.  Increasing fatigue.  Patient does have consultations with geriatrics and infectious disease ordered.  Patient has MRI of brain scheduled.      Review of Systems   Constitutional:  Positive for fatigue. Negative for appetite change and fever.   Respiratory:  Negative for chest tightness and shortness of breath.    Cardiovascular:  Negative for chest  "pain, palpitations and leg swelling.   Gastrointestinal:  Negative for abdominal pain.        Incontenience   Genitourinary:  Negative for difficulty urinating.        HPI   Musculoskeletal:  Positive for gait problem. Negative for arthralgias and myalgias.   Skin:  Negative for wound.   Neurological:  Negative for dizziness, light-headedness and headaches.        HPI   Psychiatric/Behavioral:  Negative for dysphoric mood and sleep disturbance. The patient is not nervous/anxious.        Objective   /58 (BP Location: Right arm, Patient Position: Sitting, Cuff Size: Adult)   Pulse 58   Temp 97.8 °F (36.6 °C) (Temporal)   Resp 14   Ht 5' 10\" (1.778 m)   Wt 68.8 kg (151 lb 9.6 oz)   SpO2 98%   BMI 21.75 kg/m²      Physical Exam  Vitals and nursing note reviewed.   Constitutional:       Appearance: Normal appearance.   HENT:      Head: Normocephalic and atraumatic.      Mouth/Throat:      Mouth: Mucous membranes are moist.     Eyes:      General: No scleral icterus.      Cardiovascular:      Rate and Rhythm: Normal rate and regular rhythm.      Heart sounds: Normal heart sounds.   Pulmonary:      Effort: Pulmonary effort is normal.      Breath sounds: Normal breath sounds.   Abdominal:      Palpations: Abdomen is soft.      Tenderness: There is no abdominal tenderness.     Musculoskeletal:      Cervical back: Neck supple. No rigidity.      Right lower leg: No edema.      Left lower leg: No edema.     Skin:     General: Skin is warm and dry.     Neurological:      Mental Status: He is alert.      Comments: Follow simple verbal commands   Psychiatric:         Mood and Affect: Mood normal.         "

## 2025-05-14 NOTE — ASSESSMENT & PLAN NOTE
Lab Results   Component Value Date    EGFR 30 04/23/2025    EGFR Not performed when sex is unknown. 10/28/2020    EGFR 79 08/18/2018    EGFR 82 08/18/2018    CREATININE 2.07 (H) 04/23/2025    CREATININE 1.38 (H) 10/28/2020    CREATININE 1.11 08/18/2018    CREATININE 1.08 08/18/2018   Increase p.o. fluids repeat BMP in 1 week  Orders:    Basic metabolic panel; Future

## 2025-05-14 NOTE — TELEPHONE ENCOUNTER
Pt daughter calling to r/s consult for 5/16. No available apts and per office they are unsure if apt can be r/s. Advised to send encounter and office will return daughter call to help r/s

## 2025-05-14 NOTE — ASSESSMENT & PLAN NOTE
Patient is upcoming appoint with infectious disease question if new or old, question neurosyphilis

## 2025-05-14 NOTE — ASSESSMENT & PLAN NOTE
Patient's daughter wanted him to see urologist, ordered, patient will complete urinalysis and drop at the lab.  Patient's PSA was normal.  Daughter would like him to have depends, ordered I gave daughter a hat and a urine container she will obtain a urine and drop it off at the lab  Orders:    Incontinence Supply Disposable (Depend Undergarments) MISC; Use every 4-6 hours as needed    Ambulatory Referral to Urology; Future

## 2025-05-14 NOTE — TELEPHONE ENCOUNTER
I called Ludivina to r/s Marcie appt. No answer, I left a message to call the office back to schedule.  He needs to be scheduled with a doctor for a consult. Dr. Almonte has an opening in [place] 5/22 if they would be willing to go to that office. Dread Marvin has openings in [place] 5/20 and 5/21.

## 2025-05-19 ENCOUNTER — TELEPHONE (OUTPATIENT)
Age: 75
End: 2025-05-19

## 2025-05-19 NOTE — TELEPHONE ENCOUNTER
New Patient      Insurance   Current Insurance?Medicare [facility]    Insurance E-verified? yes     History   Reason for appointment/active symptoms?  Urinary incontinence, unspecified type      Has the patient had any previous Urologist(s)? no     Was the patient seen in the ED? no     Labs/Imaging(Including Out Of Network)? labs 5/2025 and will be completing MRI this week     Records Requested? no  Records Visible in EPIC? yes      Personal history of cancer? no     Appointment   Office location preference:  [facility]   ?   Appointment Details   Date:  6/10/25  Time:  10am  Location:  [facility]   Provider:  Shruthi  Does the appointment need further review? no

## 2025-05-23 PROBLEM — Z12.5 SCREENING FOR PROSTATE CANCER: Status: RESOLVED | Noted: 2025-04-23 | Resolved: 2025-05-23

## 2025-05-23 PROBLEM — Z00.00 HEALTHCARE MAINTENANCE: Status: RESOLVED | Noted: 2025-04-23 | Resolved: 2025-05-23

## 2025-05-23 PROBLEM — Z12.11 SCREENING FOR COLON CANCER: Status: RESOLVED | Noted: 2025-04-23 | Resolved: 2025-05-23

## 2025-05-30 ENCOUNTER — CONSULT (OUTPATIENT)
Dept: INFECTIOUS DISEASES | Facility: CLINIC | Age: 75
End: 2025-05-30
Payer: MEDICARE

## 2025-05-30 VITALS
BODY MASS INDEX: 22.05 KG/M2 | OXYGEN SATURATION: 98 % | TEMPERATURE: 96.7 F | HEIGHT: 70 IN | SYSTOLIC BLOOD PRESSURE: 122 MMHG | RESPIRATION RATE: 18 BRPM | DIASTOLIC BLOOD PRESSURE: 70 MMHG | WEIGHT: 154 LBS | HEART RATE: 60 BPM

## 2025-05-30 DIAGNOSIS — A52.3 NEUROSYPHILIS: Primary | ICD-10-CM

## 2025-05-30 DIAGNOSIS — R41.3 MEMORY DEFICIT: ICD-10-CM

## 2025-05-30 DIAGNOSIS — R25.1 TREMOR: ICD-10-CM

## 2025-05-30 PROCEDURE — 99204 OFFICE O/P NEW MOD 45 MIN: CPT | Performed by: INTERNAL MEDICINE

## 2025-05-30 PROCEDURE — G2211 COMPLEX E/M VISIT ADD ON: HCPCS | Performed by: INTERNAL MEDICINE

## 2025-05-30 NOTE — ASSESSMENT & PLAN NOTE
Patient likely has dementia.  However, we need to consider the possibility of neurosyphilis, as above.    Outpatient records reviewed in detail.  Discussed with patient and his daughter in detail regarding the above plan.  Recommend admission for lumbar puncture, IV penicillin initiation and transition to short-term rehab.  We will see patient in the hospital after admission.

## 2025-05-30 NOTE — ASSESSMENT & PLAN NOTE
Patient has significantly increased tremor and unsteadiness with walking over the last 6 months.  Concern for neurosyphilis, as above.  A known neurological deficit of neurosyphilis is loss of posterior column function, resulting in unsteadiness with walking.  Monitor symptoms after neurosyphilis treatment.

## 2025-05-30 NOTE — PROGRESS NOTES
Name: Mo Berry      : 1950      MRN: [id number]  Encounter Provider: Mehdi Marvin MD  Encounter Date: 2025   Encounter department: [facility]  :  Assessment & Plan  Neurosyphilis  As part of memory loss workup, patient had syphilis screen done, showing positive treponema antibody screen, negative RPR and positive second treponema antibody screen.  The serological picture is consistent with prior syphilis, either untreated or prior treatment.  Since we do not have any record of prior syphilis treatment, patient will need to be treated for syphilis.  Furthermore, due to worsening memory loss and unsteadiness in walking, especially with recent exacerbation, we need to consider the possibility of neurosyphilis.  Patient will need lumbar puncture.  Unless his CSF is completely normal, he will likely need treatment for neurosyphilis.  If patient needs treatment for his neurosyphilis, he will most likely need to be in short-term rehabilitation, since he would be unable to administer it by himself at home.  Plan for lumbar puncture with CSF assess for routine studies and VDRL.  Unless CSF is completely normal, will likely treat patient for neurosyphilis with high-dose IV PCN at 4 million units every 4 hours dosing for 14 days.  Recommend HIV screen during admission also.  Tremor  Patient has significantly increased tremor and unsteadiness with walking over the last 6 months.  Concern for neurosyphilis, as above.  A known neurological deficit of neurosyphilis is loss of posterior column function, resulting in unsteadiness with walking.  Monitor symptoms after neurosyphilis treatment.  Memory deficit  Patient likely has dementia.  However, we need to consider the possibility of neurosyphilis, as above.    Outpatient records reviewed in detail.  Discussed with patient and his daughter in detail regarding the above plan.  Recommend admission for lumbar puncture, IV penicillin  "initiation and transition to short-term rehab.  We will see patient in the hospital after admission.    History of Present Illness   Reason for Consult: Concern for neurosyphilis.  HPI: Mo Berry is a 75 y.o. year old male, with a few stable medical problems, has developed worsening memory deficit and ambulatory difficulty over the last few years.  As part of evaluation of possible dementia, he had syphilis screen done.  This came back Treponema positive and RPR negative.  For these reasons, we are asked to evaluate the patient.    Patient is quite confused.  It is difficult to get any information from him.  Fortunately, his daughter, who accompanies him, is able to provide significant history.  She states that he has had worsening memory and ambulatory issues last few years.  However, over the last 6 months, she has seen quite significant acceleration of memory loss and unsteadiness when walking.  No focal weakness.    Patient's daughter has no knowledge of patient's prior medical history, sexual contact, any STD or any prior treatment for syphilis.    Patient lives at home with his daughter and her family.  However, patient is away at work all day and patient is at home by himself.    ROS: A complete review of systems are negative other than that noted in the HPI   Medical History Reviewed by provider this encounter:     .     Antibiotics: None    Objective   /70   Pulse 60   Temp (!) 96.7 °F (35.9 °C)   Resp 18   Ht 5' 10\" (1.778 m)   Wt 69.9 kg (154 lb)   SpO2 98%   BMI 22.10 kg/m²      General: Alert, interactive, appearing well, nontoxic, no acute distress.  Significant confusion and minimal memory of events.  Throat: Oropharynx moist without lesions.   Lungs: Clear to auscultation bilaterally; no audible wheezes, rhonchi or rales; respirations unlabored  Heart: RRR; no murmur, rub or gallop  Abdomen: Soft, non-tender, non-distended, positive bowel sounds.    Extremities: No clubbing, cyanosis " or edema  Skin: No new rashes or lesions. No new draining wounds.    Lab Results: I have personally reviewed pertinent labs.  Lab Results   Component Value Date    K 4.4 04/23/2025     04/23/2025    CO2 26 04/23/2025    BUN 27 (H) 04/23/2025    CREATININE 2.07 (H) 04/23/2025    CALCIUM 9.5 04/23/2025    AST 22 04/23/2025    ALT 13 04/23/2025    ALKPHOS 81 04/23/2025    EGFR 30 04/23/2025     Lab Results   Component Value Date    WBC 4.49 04/23/2025    HGB 11.6 (L) 04/23/2025    HCT 35.4 (L) 04/23/2025    MCV 90 04/23/2025     04/23/2025     Lab Results   Component Value Date    ESR 16 (H) 10/04/2017             Administrative Statements   I have spent a total time of 45 minutes in caring for this patient on the day of the visit/encounter including Diagnostic results, Prognosis, Risks and benefits of tx options, Impressions, Counseling / Coordination of care, Documenting in the medical record, Reviewing/placing orders in the medical record (including tests, medications, and/or procedures), and Obtaining or reviewing history  .

## 2025-05-31 ENCOUNTER — HOSPITAL ENCOUNTER (INPATIENT)
Facility: HOSPITAL | Age: 75
LOS: 7 days | Discharge: NURSING, REHAB OR LONG-TERM CARE | DRG: 884 | End: 2025-06-07
Attending: EMERGENCY MEDICINE
Payer: MEDICARE

## 2025-05-31 ENCOUNTER — APPOINTMENT (EMERGENCY)
Dept: RADIOLOGY | Facility: HOSPITAL | Age: 75
DRG: 884 | End: 2025-05-31
Payer: MEDICARE

## 2025-05-31 DIAGNOSIS — G93.40 ENCEPHALOPATHY ACUTE: ICD-10-CM

## 2025-05-31 DIAGNOSIS — R41.82 ALTERED MENTAL STATUS: ICD-10-CM

## 2025-05-31 DIAGNOSIS — A53.9 SYPHILIS: ICD-10-CM
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
## 2025-05-31 DIAGNOSIS — A53.0 POSITIVE RPR TEST: ICD-10-CM

## 2025-05-31 LAB
ALBUMIN SERPL BCG-MCNC: 4 G/DL (ref 3.5–5)
ALP SERPL-CCNC: 77 U/L (ref 34–104)
ALT SERPL W P-5'-P-CCNC: 20 U/L (ref 7–52)
ANION GAP SERPL CALCULATED.3IONS-SCNC: 7 MMOL/L (ref 4–13)
AST SERPL W P-5'-P-CCNC: 20 U/L (ref 13–39)
ATRIAL RATE: 76 BPM
BACTERIA UR QL AUTO: ABNORMAL /HPF
BASOPHILS # BLD AUTO: 0.01 THOUSANDS/ÂΜL (ref 0–0.1)
BASOPHILS NFR BLD AUTO: 0 % (ref 0–1)
BILIRUB SERPL-MCNC: 0.32 MG/DL (ref 0.2–1)
BILIRUB UR QL STRIP: NEGATIVE
BUN SERPL-MCNC: 28 MG/DL (ref 5–25)
CALCIUM SERPL-MCNC: 9.6 MG/DL (ref 8.4–10.2)
CHLORIDE SERPL-SCNC: 110 MMOL/L (ref 96–108)
CK SERPL-CCNC: 183 U/L (ref 39–308)
CLARITY UR: CLEAR
CO2 SERPL-SCNC: 24 MMOL/L (ref 21–32)
COLOR UR: YELLOW
CREAT SERPL-MCNC: 1.52 MG/DL (ref 0.6–1.3)
EOSINOPHIL # BLD AUTO: 0.06 THOUSAND/ÂΜL (ref 0–0.61)
EOSINOPHIL NFR BLD AUTO: 2 % (ref 0–6)
ERYTHROCYTE [DISTWIDTH] IN BLOOD BY AUTOMATED COUNT: 13.6 % (ref 11.6–15.1)
GFR SERPL CREATININE-BSD FRML MDRD: 44 ML/MIN/1.73SQ M
GLUCOSE SERPL-MCNC: 135 MG/DL (ref 65–140)
GLUCOSE UR STRIP-MCNC: ABNORMAL MG/DL
HCT VFR BLD AUTO: 33.7 % (ref 36.5–49.3)
HGB BLD-MCNC: 10.7 G/DL (ref 12–17)
HGB UR QL STRIP.AUTO: ABNORMAL
HYALINE CASTS #/AREA URNS LPF: ABNORMAL /LPF
IMM GRANULOCYTES # BLD AUTO: 0.03 THOUSAND/UL (ref 0–0.2)
IMM GRANULOCYTES NFR BLD AUTO: 1 % (ref 0–2)
INR PPP: 0.99 (ref 0.85–1.19)
KETONES UR STRIP-MCNC: NEGATIVE MG/DL
LEUKOCYTE ESTERASE UR QL STRIP: NEGATIVE
LIPASE SERPL-CCNC: 30 U/L (ref 11–82)
LYMPHOCYTES # BLD AUTO: 1.25 THOUSANDS/ÂΜL (ref 0.6–4.47)
LYMPHOCYTES NFR BLD AUTO: 32 % (ref 14–44)
MCH RBC QN AUTO: 29.1 PG (ref 26.8–34.3)
MCHC RBC AUTO-ENTMCNC: 31.8 G/DL (ref 31.4–37.4)
MCV RBC AUTO: 92 FL (ref 82–98)
MONOCYTES # BLD AUTO: 0.28 THOUSAND/ÂΜL (ref 0.17–1.22)
MONOCYTES NFR BLD AUTO: 7 % (ref 4–12)
MUCOUS THREADS UR QL AUTO: ABNORMAL
NEUTROPHILS # BLD AUTO: 2.3 THOUSANDS/ÂΜL (ref 1.85–7.62)
NEUTS SEG NFR BLD AUTO: 58 % (ref 43–75)
NITRITE UR QL STRIP: NEGATIVE
NON-SQ EPI CELLS URNS QL MICRO: ABNORMAL /HPF
NRBC BLD AUTO-RTO: 0 /100 WBCS
P AXIS: 104 DEGREES
PH UR STRIP.AUTO: 5.5 [PH]
PLATELET # BLD AUTO: 216 THOUSANDS/UL (ref 149–390)
PMV BLD AUTO: 11.1 FL (ref 8.9–12.7)
POTASSIUM SERPL-SCNC: 4.4 MMOL/L (ref 3.5–5.3)
PR INTERVAL: 180 MS
PROT SERPL-MCNC: 7 G/DL (ref 6.4–8.4)
PROT UR STRIP-MCNC: ABNORMAL MG/DL
PROTHROMBIN TIME: 13.4 SECONDS (ref 12.3–15)
QRS AXIS: 73 DEGREES
QRSD INTERVAL: 74 MS
QT INTERVAL: 358 MS
QTC INTERVAL: 402 MS
RBC # BLD AUTO: 3.68 MILLION/UL (ref 3.88–5.62)
RBC #/AREA URNS AUTO: ABNORMAL /HPF
SODIUM SERPL-SCNC: 141 MMOL/L (ref 135–147)
SP GR UR STRIP.AUTO: 1.03 (ref 1–1.03)
T WAVE AXIS: 23 DEGREES
UROBILINOGEN UR STRIP-ACNC: <2 MG/DL
VENTRICULAR RATE: 76 BPM
WBC # BLD AUTO: 3.93 THOUSAND/UL (ref 4.31–10.16)
WBC #/AREA URNS AUTO: ABNORMAL /HPF

## 2025-05-31 PROCEDURE — 83690 ASSAY OF LIPASE: CPT

## 2025-05-31 PROCEDURE — 80053 COMPREHEN METABOLIC PANEL: CPT

## 2025-05-31 PROCEDURE — 72125 CT NECK SPINE W/O DYE: CPT

## 2025-05-31 PROCEDURE — 99223 1ST HOSP IP/OBS HIGH 75: CPT

## 2025-05-31 PROCEDURE — 009U3ZZ DRAINAGE OF SPINAL CANAL, PERCUTANEOUS APPROACH: ICD-10-PCS

## 2025-05-31 PROCEDURE — 96365 THER/PROPH/DIAG IV INF INIT: CPT

## 2025-05-31 PROCEDURE — B01B1ZZ FLUOROSCOPY OF SPINAL CORD USING LOW OSMOLAR CONTRAST: ICD-10-PCS

## 2025-05-31 PROCEDURE — 93010 ELECTROCARDIOGRAM REPORT: CPT | Performed by: INTERNAL MEDICINE

## 2025-05-31 PROCEDURE — 99285 EMERGENCY DEPT VISIT HI MDM: CPT

## 2025-05-31 PROCEDURE — 93005 ELECTROCARDIOGRAM TRACING: CPT

## 2025-05-31 PROCEDURE — 4A00X4Z MEASUREMENT OF CENTRAL NERVOUS ELECTRICAL ACTIVITY, EXTERNAL APPROACH: ICD-10-PCS | Performed by: PSYCHIATRY & NEUROLOGY

## 2025-05-31 PROCEDURE — 85610 PROTHROMBIN TIME: CPT | Performed by: RADIOLOGY

## 2025-05-31 PROCEDURE — 82550 ASSAY OF CK (CPK): CPT

## 2025-05-31 PROCEDURE — 74176 CT ABD & PELVIS W/O CONTRAST: CPT

## 2025-05-31 PROCEDURE — 71250 CT THORAX DX C-: CPT

## 2025-05-31 PROCEDURE — 81001 URINALYSIS AUTO W/SCOPE: CPT

## 2025-05-31 PROCEDURE — 70450 CT HEAD/BRAIN W/O DYE: CPT

## 2025-05-31 PROCEDURE — 36415 COLL VENOUS BLD VENIPUNCTURE: CPT

## 2025-05-31 PROCEDURE — 85025 COMPLETE CBC W/AUTO DIFF WBC: CPT

## 2025-05-31 PROCEDURE — 87389 HIV-1 AG W/HIV-1&-2 AB AG IA: CPT

## 2025-05-31 RX ORDER — SODIUM CHLORIDE, SODIUM GLUCONATE, SODIUM ACETATE, POTASSIUM CHLORIDE, MAGNESIUM CHLORIDE, SODIUM PHOSPHATE, DIBASIC, AND POTASSIUM PHOSPHATE .53; .5; .37; .037; .03; .012; .00082 G/100ML; G/100ML; G/100ML; G/100ML; G/100ML; G/100ML; G/100ML
500 INJECTION, SOLUTION INTRAVENOUS ONCE
Status: COMPLETED | OUTPATIENT
Start: 2025-05-31 | End: 2025-05-31

## 2025-05-31 RX ORDER — ENOXAPARIN SODIUM 100 MG/ML
40 INJECTION SUBCUTANEOUS DAILY
Status: DISCONTINUED | OUTPATIENT
Start: 2025-06-01 | End: 2025-05-31

## 2025-05-31 RX ORDER — ONDANSETRON 2 MG/ML
4 INJECTION INTRAMUSCULAR; INTRAVENOUS EVERY 6 HOURS PRN
Status: DISCONTINUED | OUTPATIENT
Start: 2025-05-31 | End: 2025-06-07 | Stop reason: HOSPADM

## 2025-05-31 RX ORDER — ACETAMINOPHEN 325 MG/1
650 TABLET ORAL EVERY 6 HOURS PRN
Status: DISCONTINUED | OUTPATIENT
Start: 2025-05-31 | End: 2025-06-07 | Stop reason: HOSPADM

## 2025-05-31 RX ORDER — LIDOCAINE HYDROCHLORIDE 10 MG/ML
10 INJECTION, SOLUTION EPIDURAL; INFILTRATION; INTRACAUDAL; PERINEURAL ONCE
Status: COMPLETED | OUTPATIENT
Start: 2025-05-31 | End: 2025-05-31

## 2025-05-31 RX ADMIN — LIDOCAINE HYDROCHLORIDE 10 ML: 10 INJECTION, SOLUTION EPIDURAL; INFILTRATION; INTRACAUDAL; PERINEURAL at 17:29

## 2025-05-31 RX ADMIN — SODIUM CHLORIDE, SODIUM GLUCONATE, SODIUM ACETATE, POTASSIUM CHLORIDE, MAGNESIUM CHLORIDE, SODIUM PHOSPHATE, DIBASIC, AND POTASSIUM PHOSPHATE 500 ML: .53; .5; .37; .037; .03; .012; .00082 INJECTION, SOLUTION INTRAVENOUS at 15:10

## 2025-05-31 NOTE — ASSESSMENT & PLAN NOTE
Lab Results   Component Value Date    EGFR 44 05/31/2025    EGFR 30 04/23/2025    EGFR Not performed when sex is unknown. 10/28/2020    CREATININE 1.52 (H) 05/31/2025    CREATININE 2.07 (H) 04/23/2025    CREATININE 1.38 (H) 10/28/2020   Creatinine currently at baseline

## 2025-05-31 NOTE — ED ATTENDING ATTESTATION
5/31/2025  I, Contreras Bone DO, saw and evaluated the patient. I have discussed the patient with the resident/non-physician practitioner and agree with the resident's/non-physician practitioner's findings, Plan of Care, and MDM as documented in the resident's/non-physician practitioner's note, except where noted. All available labs and Radiology studies were reviewed.  I was present for key portions of any procedure(s) performed by the resident/non-physician practitioner and I was immediately available to provide assistance.       At this point I agree with the current assessment done in the Emergency Department.  I have conducted an independent evaluation of this patient a history and physical is as follows:    75-year-old male presenting for altered mental status and generalized overall decline.  Patient has been steadily declining over the past few months was able to walk and talk and perform activities of daily living according to the daughter.  Now he will essentially do anything can barely walk has urinary incontinence.  Was seen infectious disease who noted that the patient had a history of syphilis which showed a positive treponema antibody screen and a positive secondary treponema screen.    Patient himself currently does not have any complaints does not really communicate, sometimes follows commands no evidence of any trauma overall appears well cared for although does have dry stool in his boxers.  No rashes otherwise lower extremities have what appeared to be old scars but nothing new.  No evidence of any head trauma is afebrile slightly hypotensive but systolic above 90    Plan CT head, labs for metabolic derangement anemia thrombocytopenia, CT chest abdomen pelvis as the patient was found on the ground, urinalysis LP rule out syphilis or meningitis    ED Course         Critical Care Time  Procedures

## 2025-05-31 NOTE — ASSESSMENT & PLAN NOTE
Patient has had rapid decline previously independent 6 weeks prior and oriented x 4 according to the daughter.  Deteriorated the past 6 weeks where now patient is fully dependent with all ADLs and is no longer oriented and just recently started having bowel and bladder incontinence  TSH is normal,  Hepatitis C, Lyme, heavy metals, were all negative  RPR is positive and treponema antibody screen was also positive  Followed with infectious disease on 5/30/2025  Was diagnosed as neurosyphilis.  Plan to do lumbar puncture  Plan:  There was an attempt to do a lumbar puncture in the emergency department however was not able to be done  Will get IR lumbar puncture. Reached out to IR and patient will possibly get one tomorrow.   Unless patient's CSF is completely normal we will likely treat the patient with neurosyphilis with high-dose IV PCN at 4,000,000 units every 4 hours for 14 days  Discussed with ID and since patient is not septic can hold off ab for now  HIV is also pending  Incontinence.   EEG   Nonurgent neuro consult  Holding Enoxaparin for LP tomorrow

## 2025-05-31 NOTE — ED PROCEDURE NOTE
Procedure  Lumbar puncture    Date/Time: 5/31/2025 4:54 PM    Performed by: Alex Pineda MD  Authorized by: Alex Pineda MD    Universal Protocol:  Consent: Verbal consent obtained. Written consent obtained  Risks and benefits: risks, benefits and alternatives were discussed  Consent given by: daughter.    Patient location:  ED  Indications:     Indications: evaluation for infection and evaluation for altered mental status    Anesthesia (see MAR for exact dosages):     Anesthesia method:  Local infiltration    Local anesthetic:  Lidocaine 1% w/o epi  Procedure details:     Lumbar space:  L3-L4 interspace    Equipment: Lumbar puncture kit used      Needle gauge:  22G x 3.5in    Needle type:  Spinal needle - Quincke tip    Ultrasound guidance: no      Number of attempts:  3  Post-procedure:     Puncture site:  Adhesive bandage applied    Patient tolerance of procedure:  Tolerated well, no immediate complications    Patient instructed to lie flat for one(1) hour post lumbar puncture.: Yes    Comments:      Nonsuccessful lumbar puncture likely due to arthritis.                   Alex Pineda MD  05/31/25 2096

## 2025-05-31 NOTE — H&P
H&P - Hospitalist   Name: Mo Berry 75 y.o. male I MRN: [id number]  Unit/Bed#: ED 01 I Date of Admission: 5/31/2025   Date of Service: 5/31/2025 I Hospital Day: 0     Assessment & Plan  Positive RPR test  Patient has had rapid decline previously independent 6 weeks prior and oriented x 4 according to the daughter.  Deteriorated the past 6 weeks where now patient is fully dependent with all ADLs and is no longer oriented and just recently started having bowel and bladder incontinence  TSH is normal,  Hepatitis C, Lyme, heavy metals, were all negative  RPR is positive and treponema antibody screen was also positive  Followed with infectious disease on 5/30/2025  Was diagnosed as neurosyphilis.  Plan to do lumbar puncture  Plan:  There was an attempt to do a lumbar puncture in the emergency department however was not able to be done  Will get IR lumbar puncture. Reached out to IR and patient will possibly get one tomorrow.   Unless patient's CSF is completely normal we will likely treat the patient with neurosyphilis with high-dose IV PCN at 4,000,000 units every 4 hours for 14 days  Discussed with ID and since patient is not septic can hold off ab for now  HIV is also pending  Incontinence.   EEG   Nonurgent neuro consult  Holding Enoxaparin for LP tomorrow  Chronic kidney disease, stage 3b (HCC)  Lab Results   Component Value Date    EGFR 44 05/31/2025    EGFR 30 04/23/2025    EGFR Not performed when sex is unknown. 10/28/2020    CREATININE 1.52 (H) 05/31/2025    CREATININE 2.07 (H) 04/23/2025    CREATININE 1.38 (H) 10/28/2020   Creatinine currently at baseline  Encephalopathy acute  Plan noted above      VTE Pharmacologic Prophylaxis:   Moderate Risk (Score 3-4) - Pharmacological DVT Prophylaxis Ordered: heparin.  Code Status: Level 1 full code  Discussion with family: Updated  (daughter) via phone.    Anticipated Length of Stay: Patient will be admitted on an inpatient basis with an anticipated  length of stay of greater than 2 midnights secondary to ams.    History of Present Illness   Chief Complaint: sukhdev Berry is a 75 y.o. male with a usually patient is oriented and independent.  However patient has had a fast decline in the past 6 weeks.  Now only oriented to self.  Recently became fully dependent in terms of all ADLs  Workup was done in the outpatient setting and syphilis was positive.  Was diagnosed as neurosyphilis    Review of Systems   Constitutional:  Negative for chills and fever.   HENT:  Negative for ear pain and sore throat.    Eyes:  Negative for pain and visual disturbance.   Respiratory:  Negative for cough and shortness of breath.    Cardiovascular:  Negative for chest pain and palpitations.   Gastrointestinal:  Negative for abdominal pain and vomiting.   Genitourinary:  Negative for dysuria and hematuria.   Musculoskeletal:  Negative for arthralgias and back pain.   Skin:  Negative for color change and rash.   Neurological:  Negative for seizures and syncope.   All other systems reviewed and are negative.      Historical Information   Past Medical History[1]  Past Surgical History[2]  Social History[3]  E-Cigarette/Vaping    E-Cigarette Use Never User      E-Cigarette/Vaping Substances    Nicotine No     THC No     CBD No     Flavoring No     Other No     Unknown No      Family history non-contributory    Meds/Allergies   I have reviewed home medications with patient personally.  Prior to Admission medications    Medication Sig Start Date End Date Taking? Authorizing Provider   acetaminophen (TYLENOL) 325 mg tablet Take 650 mg by mouth every 6 (six) hours as needed for mild pain    Historical Provider, MD   Incontinence Supply Disposable (Depend Undergarments) MISC Use every 4-6 hours as needed 5/14/25   Selvin Dumont, DO   vitamin B-12 (VITAMIN B-12) 1,000 mcg tablet Take 1,000 mcg by mouth in the morning.    Historical Provider, MD     No Known Allergies    Objective  ":  HR:  [63-70] 66  BP: ()/(56-69) 128/69  Resp:  [14-20] 20  SpO2:  [94 %-100 %] 96 %  O2 Device: None (Room air)    Physical Exam  Vitals and nursing note reviewed.   Constitutional:       Appearance: He is well-developed and normal weight. He is ill-appearing.   HENT:      Head: Normocephalic and atraumatic.      Nose: Nose normal.      Mouth/Throat:      Mouth: Mucous membranes are moist.     Eyes:      Conjunctiva/sclera: Conjunctivae normal.       Cardiovascular:      Rate and Rhythm: Normal rate and regular rhythm.      Heart sounds: Normal heart sounds. No murmur heard.  Pulmonary:      Effort: Pulmonary effort is normal. No respiratory distress.      Breath sounds: Normal breath sounds.   Abdominal:      General: Abdomen is flat.      Palpations: Abdomen is soft.      Tenderness: There is no abdominal tenderness.     Musculoskeletal:         General: No swelling.      Cervical back: Neck supple.      Right lower leg: No edema.      Left lower leg: No edema.     Skin:     General: Skin is warm and dry.      Capillary Refill: Capillary refill takes less than 2 seconds.     Neurological:      Mental Status: He is alert.      Comments: Patient is nonresponsive, not oriented.   Psychiatric:         Mood and Affect: Mood normal.          Lines/Drains:            Lab Results: I have reviewed the following results:  Results from last 7 days   Lab Units 05/31/25  1447   WBC Thousand/uL 3.93*   HEMOGLOBIN g/dL 10.7*   HEMATOCRIT % 33.7*   PLATELETS Thousands/uL 216   SEGS PCT % 58   LYMPHO PCT % 32   MONO PCT % 7   EOS PCT % 2     Results from last 7 days   Lab Units 05/31/25  1447   SODIUM mmol/L 141   POTASSIUM mmol/L 4.4   CHLORIDE mmol/L 110*   CO2 mmol/L 24   BUN mg/dL 28*   CREATININE mg/dL 1.52*   ANION GAP mmol/L 7   CALCIUM mg/dL 9.6   ALBUMIN g/dL 4.0   TOTAL BILIRUBIN mg/dL 0.32   ALK PHOS U/L 77   ALT U/L 20   AST U/L 20   GLUCOSE RANDOM mg/dL 135             No results found for: \"HGBA1C\"    "         Administrative Statements   I have spent a total time of 80 minutes in caring for this patient on the day of the visit/encounter including Diagnostic results, Prognosis, Risks and benefits of tx options, Instructions for management, Patient and family education, Importance of tx compliance, Risk factor reductions, Impressions, Counseling / Coordination of care, Documenting in the medical record, Reviewing/placing orders in the medical record (including tests, medications, and/or procedures), Obtaining or reviewing history  , and Communicating with other healthcare professionals .    ** Please Note: This note has been constructed using a voice recognition system. **         [1]   Past Medical History:  Diagnosis Date    Alcohol intoxication (HCC) 08/17/2018    Anemia     Fall     Low back pain     For 25 years   [2] No past surgical history on file.  [3]   Social History  Tobacco Use    Smoking status: Former     Types: Cigarettes    Smokeless tobacco: Never    Tobacco comments:     Former smoker /quit 2010 as per [employer]   Vaping Use    Vaping status: Never Used   Substance and Sexual Activity    Alcohol use: Yes     Alcohol/week: 4.0 standard drinks of alcohol     Types: 4 Cans of beer per week     Comment: on weekends    Drug use: No

## 2025-05-31 NOTE — ED PROVIDER NOTES
Time reflects when diagnosis was documented in both MDM as applicable and the Disposition within this note       Time User Action Codes Description Comment    5/31/2025  2:30 PM Alex Pineda Add [W19.XXXA] Fall, initial encounter     5/31/2025  2:30 PM Alex Pineda Add [R41.82] Altered mental status     5/31/2025  5:55 PM Melvin Joyner Add [A53.0] Positive RPR test           ED Disposition       ED Disposition   Admit    Condition   Stable    Date/Time   Sat May 31, 2025  2:30 PM    Comment                  Assessment & Plan       Medical Decision Making  Patient is chronically ill-appearing on exam.  Not particularly interactive with me.  Will occasionally say thank you.  Otherwise will not talk.  Plan broad workup, syphilis and Lyme testing was already done as an outpatient.  HIV testing will be done here.  Head to pelvis CT.  Plan for LP.  LP performed according procedure note after daughter was consented via phone.  Platelets were adequate, CT head without any significant findings, patient is not on any blood thinners.  LP was not successful.  Suspect patient's lumbar arthritis and osseous changes due to age contributing to this.  Will plan for inpatient LP with IR.  Will admit to the hospital for further workup and possible placement if necessary.  Patient remained hemodynamically stable throughout his course in the emergency department.    History and physical exam most consistent with: Altered mental status of undetermined cause    Differential also includes but is not limited to: Electrolyte abnormality, anemia, dementia, neurosyphilis, fracture, traumatic injury, occult intracranial process    Patient denies any additional symptoms on direct questioning except those explicitly noted in the HPI and ROS.     Triage note was reviewed and patient asked directly about concerns mentioned in triage note.     I will order appropriate testing to narrow my differential    Unless otherwise noted:  - There is no  language barrier  - Chart was reviewed   - Labs and imaging were reviewed    Amount and/or Complexity of Data Reviewed  Labs: ordered.  Radiology: ordered.    Risk  Prescription drug management.  Decision regarding hospitalization.        ED Course as of 05/31/25 1824   Sat May 31, 2025   1524 EKG interpretation by me normal sinus rhythm rate 76.  No ST segment elevations or depressions.  Normal OR, QT, QRS.  Normal axis.  No significant change from July 25, 2017 EKG       Medications   acetaminophen (TYLENOL) tablet 650 mg (has no administration in time range)   ondansetron (ZOFRAN) injection 4 mg (has no administration in time range)   enoxaparin (LOVENOX) subcutaneous injection 40 mg (has no administration in time range)   multi-electrolyte (Plasmalyte-A/Isolyte-S PH 7.4/Normosol-R) IV bolus 500 mL (0 mL Intravenous Stopped 5/31/25 1540)   lidocaine (PF) (XYLOCAINE-MPF) 1 % injection 10 mL (10 mL Infiltration Given by Other 5/31/25 1729)       ED Risk Strat Scores                    No data recorded                            History of Present Illness       Chief Complaint   Patient presents with    Fall     Per EMS patient coming from home where he had an unwitnessed fall, found by daughter in fetal position. Unknown HS, unknown LOC, - thinners. EMS reports ongoing cognitive decline and has been being evaluated for dementia. Hx syphilis        Past Medical History[1]   Past Surgical History[2]   Family History[3]   Social History[4]   E-Cigarette/Vaping    E-Cigarette Use Never User       E-Cigarette/Vaping Substances    Nicotine No     THC No     CBD No     Flavoring No     Other No     Unknown No       I have reviewed and agree with the history as documented.     Patient is a 75-year-old male with a past medical history of anemia presents emergency department with report of multiple falls over the past month.  Generalized overall decline over the past 2 months as well per his daughter.  She found him on the  floor today in a fetal position.  She is unsure how long he was on the ground.  Notably given his rapid generalized decline he has been seen by infectious disease as an outpatient due to a positive syphilis antibody screen as well as a second positive syphilis antibody screen.  They are concerned he may have neurosyphilis and LP was scheduled for Monday of this week.  Per his daughter he has not had any vomiting, diarrhea, fever, chills.  He does not really tolerate p.o. intake much at home because of his decline he does not want to eat.         Review of Systems   Reason unable to perform ROS: Altered mental status.           Objective       ED Triage Vitals [05/31/25 1341]   Temp Pulse Blood Pressure Respirations SpO2 Patient Position - Orthostatic VS   -- 68 134/58 20 94 % Sitting      Temp src Heart Rate Source BP Location FiO2 (%) Pain Score    -- Monitor Left arm -- --      Vitals      Date and Time Temp Pulse SpO2 Resp BP Pain Score FACES Pain Rating User   05/31/25 1800 -- 65 100 % 13 133/68 -- -- HB   05/31/25 1701 -- 66 96 % 20 128/69 -- --    05/31/25 1601 -- 63 100 % 14 106/56 -- --    05/31/25 1510 -- 66 99 % 15 126/61 -- -- [facility]   05/31/25 1430 -- 68 98 % 16 -- -- -- [facility]   05/31/25 1401 -- 70 100 % 18 95/57 -- --    05/31/25 1341 -- 68 94 % 20 134/58 -- -- HB            Physical Exam  Constitutional:       General: He is not in acute distress.     Appearance: He is normal weight. He is not ill-appearing, toxic-appearing or diaphoretic.      Comments: Curled in fetal position on the bed, dried stool around his butt.  Head to toe physical exam does not reveal any wounds, redness, crepitus   HENT:      Head: Normocephalic and atraumatic.      Ears:      Comments: Bilateral TMs obscured by wax     Nose: Nose normal.      Mouth/Throat:      Mouth: Mucous membranes are moist.      Pharynx: Oropharynx is clear. No oropharyngeal exudate or posterior oropharyngeal erythema.     Eyes:      General: No  scleral icterus.        Right eye: No discharge.         Left eye: No discharge.      Extraocular Movements: Extraocular movements intact.      Conjunctiva/sclera: Conjunctivae normal.      Pupils: Pupils are equal, round, and reactive to light.       Cardiovascular:      Rate and Rhythm: Normal rate and regular rhythm.      Pulses: Normal pulses.      Heart sounds: Normal heart sounds. No murmur heard.     No friction rub. No gallop.   Pulmonary:      Effort: Pulmonary effort is normal. No respiratory distress.      Breath sounds: Normal breath sounds. No stridor. No wheezing, rhonchi or rales.   Chest:      Chest wall: No tenderness.   Abdominal:      General: Abdomen is flat. Bowel sounds are normal. There is no distension.      Palpations: Abdomen is soft. There is no mass.      Tenderness: There is no abdominal tenderness. There is no guarding or rebound.      Hernia: No hernia is present.     Musculoskeletal:      Cervical back: Normal range of motion and neck supple. No rigidity.      Comments: On head to toe physical exam, no signs of external trauma.  No CT or L-spine step-offs or deformities.  No hematomas, bleeding on the scalp.   No oral trauma or loose teeth.  No facial trauma.  No nasal septal hematoma.  No seatbelt sign.  Abdomen soft .   Pelvis is stable.     Lymphadenopathy:      Cervical: No cervical adenopathy.     Skin:     General: Skin is warm and dry.      Capillary Refill: Capillary refill takes less than 2 seconds.     Neurological:      Mental Status: He is disoriented.         Results Reviewed       Procedure Component Value Units Date/Time    Protime-INR [[id number]] Collected: 05/31/25 1815    Lab Status: In process Specimen: Blood from Arm, Left Updated: 05/31/25 1821    Urine Microscopic [[id number]]  (Abnormal) Collected: 05/31/25 1510    Lab Status: Final result Specimen: Urine, Straight Cath Updated: 05/31/25 1559     RBC, UA 10-20 /hpf      WBC, UA None Seen /hpf      Epithelial  Cells None Seen /hpf      Bacteria, UA None Seen /hpf      MUCUS THREADS Occasional     Hyaline Casts, UA 3-5 /lpf     UA w Reflex to Microscopic w Reflex to Culture [[id number]]  (Abnormal) Collected: 05/31/25 1510    Lab Status: Final result Specimen: Urine, Straight Cath Updated: 05/31/25 1540     Color, UA Yellow     Clarity, UA Clear     Specific Gravity, UA 1.029     pH, UA 5.5     Leukocytes, UA Negative     Nitrite, UA Negative     Protein, UA 50 (1+) mg/dl      Glucose,  (3/10%) mg/dl      Ketones, UA Negative mg/dl      Urobilinogen, UA <2.0 mg/dl      Bilirubin, UA Negative     Occult Blood, UA Small    Comprehensive metabolic panel [[id number]]  (Abnormal) Collected: 05/31/25 1447    Lab Status: Final result Specimen: Blood from Arm, Left Updated: 05/31/25 1521     Sodium 141 mmol/L      Potassium 4.4 mmol/L      Chloride 110 mmol/L      CO2 24 mmol/L      ANION GAP 7 mmol/L      BUN 28 mg/dL      Creatinine 1.52 mg/dL      Glucose 135 mg/dL      Calcium 9.6 mg/dL      AST 20 U/L      ALT 20 U/L      Alkaline Phosphatase 77 U/L      Total Protein 7.0 g/dL      Albumin 4.0 g/dL      Total Bilirubin 0.32 mg/dL      eGFR 44 ml/min/1.73sq m     Narrative:      [employer] guidelines for Chronic Kidney Disease (CKD):     Stage 1 with normal or high GFR (GFR > 90 mL/min/1.73 square meters)    Stage 2 Mild CKD (GFR = 60-89 mL/min/1.73 square meters)    Stage 3A Moderate CKD (GFR = 45-59 mL/min/1.73 square meters)    Stage 3B Moderate CKD (GFR = 30-44 mL/min/1.73 square meters)    Stage 4 Severe CKD (GFR = 15-29 mL/min/1.73 square meters)    Stage 5 End Stage CKD (GFR <15 mL/min/1.73 square meters)  Note: GFR calculation is accurate only with a steady state creatinine    Lipase [[id number]]  (Normal) Collected: 05/31/25 1447    Lab Status: Final result Specimen: Blood from Arm, Left Updated: 05/31/25 1521     Lipase 30 u/L     CK [[id number]]  (Normal) Collected: 05/31/25 1447    Lab  Status: Final result Specimen: Blood from Arm, Left Updated: 05/31/25 1521     Total  U/L     CBC and differential [[id number]]  (Abnormal) Collected: 05/31/25 1447    Lab Status: Final result Specimen: Blood from Arm, Left Updated: 05/31/25 1501     WBC 3.93 Thousand/uL      RBC 3.68 Million/uL      Hemoglobin 10.7 g/dL      Hematocrit 33.7 %      MCV 92 fL      MCH 29.1 pg      MCHC 31.8 g/dL      RDW 13.6 %      MPV 11.1 fL      Platelets 216 Thousands/uL      nRBC 0 /100 WBCs      Segmented % 58 %      Immature Grans % 1 %      Lymphocytes % 32 %      Monocytes % 7 %      Eosinophils Relative 2 %      Basophils Relative 0 %      Absolute Neutrophils 2.30 Thousands/µL      Absolute Immature Grans 0.03 Thousand/uL      Absolute Lymphocytes 1.25 Thousands/µL      Absolute Monocytes 0.28 Thousand/µL      Eosinophils Absolute 0.06 Thousand/µL      Basophils Absolute 0.01 Thousands/µL     HIV 1/2 AG/AB w Reflex [facility] for 2 yr old and above [[id number]] Collected: 05/31/25 1447    Lab Status: In process Specimen: Blood from Arm, Left Updated: 05/31/25 1450            CT head without contrast   Final Interpretation by Pallav N Shah, MD (05/31 1535)      No acute intracranial abnormality. Chronic microangiopathic changes.                  Workstation performed: [id number]         CT spine cervical without contrast   Final Interpretation by Pallav N Shah, MD (05/31 1540)      No cervical spine fracture or traumatic malalignment.                  Workstation performed: [id number]         CT chest abdomen pelvis wo contrast   Final Interpretation by Aleksandra Schaefer MD (05/31 1610)      No posttraumatic injury visualized.   Mild increased attenuation of the mesentery with small lymph nodes suggesting mesenteric panniculitis.   Mild emphysema and coronary calcifications.         Computerized Assisted Algorithm (CAA) may have aided analysis of applicable images.         Workstation performed: [id number]         IR  lumbar puncture    (Results Pending)   FL IN-patient lumbar puncture    (Results Pending)       Procedures    ED Medication and Procedure Management   Prior to Admission Medications   Prescriptions Last Dose Informant Patient Reported? Taking?   Incontinence Supply Disposable (Depend Undergarments) MISC   No No   Sig: Use every 4-6 hours as needed   acetaminophen (TYLENOL) 325 mg tablet  Self Yes No   Sig: Take 650 mg by mouth every 6 (six) hours as needed for mild pain   vitamin B-12 (VITAMIN B-12) 1,000 mcg tablet  Self Yes No   Sig: Take 1,000 mcg by mouth in the morning.      Facility-Administered Medications: None     Patient's Medications   Discharge Prescriptions    No medications on file     No discharge procedures on file.  ED SEPSIS DOCUMENTATION   Time reflects when diagnosis was documented in both MDM as applicable and the Disposition within this note       Time User Action Codes Description Comment    5/31/2025  2:30 PM Alex Pineda [W19.XXXA] Fall, initial encounter     5/31/2025  2:30 PM Alex Pineda [R41.82] Altered mental status     5/31/2025  5:55 PM Melvin Joyner Add [A53.0] Positive RPR test                    [1]   Past Medical History:  Diagnosis Date    Alcohol intoxication (HCC) 08/17/2018    Anemia     Fall     Low back pain     For 25 years   [2] No past surgical history on file.  [3]   Family History  Problem Relation Name Age of Onset    Hypertension Mother      Stroke Mother     [4]   Social History  Tobacco Use    Smoking status: Former     Types: Cigarettes    Smokeless tobacco: Never    Tobacco comments:     Former smoker /quit 2010 as per [employer]   Vaping Use    Vaping status: Never Used   Substance Use Topics    Alcohol use: Yes     Alcohol/week: 4.0 standard drinks of alcohol     Types: 4 Cans of beer per week     Comment: on weekends    Drug use: No        Alex Pineda MD  05/31/25 8054

## 2025-06-01 PROBLEM — F03.90 RAPIDLY PROGRESSIVE DEMENTIA (HCC): Status: ACTIVE | Noted: 2025-06-01

## 2025-06-01 PROBLEM — A53.9 SYPHILIS: Status: ACTIVE | Noted: 2025-04-24

## 2025-06-01 LAB — HIV 1+2 AB+HIV1 P24 AG SERPL QL IA: NORMAL

## 2025-06-01 PROCEDURE — 92610 EVALUATE SWALLOWING FUNCTION: CPT

## 2025-06-01 PROCEDURE — G0545 PR INHERENT VISIT TO INPT: HCPCS | Performed by: INTERNAL MEDICINE

## 2025-06-01 PROCEDURE — 99232 SBSQ HOSP IP/OBS MODERATE 35: CPT | Performed by: FAMILY MEDICINE

## 2025-06-01 PROCEDURE — 99222 1ST HOSP IP/OBS MODERATE 55: CPT | Performed by: INTERNAL MEDICINE

## 2025-06-01 PROCEDURE — 99223 1ST HOSP IP/OBS HIGH 75: CPT | Performed by: STUDENT IN AN ORGANIZED HEALTH CARE EDUCATION/TRAINING PROGRAM

## 2025-06-01 RX ORDER — FOLIC ACID 1 MG/1
1 TABLET ORAL DAILY
Status: DISCONTINUED | OUTPATIENT
Start: 2025-06-02 | End: 2025-06-03

## 2025-06-01 RX ORDER — CYANOCOBALAMIN 1000 UG/ML
1000 INJECTION, SOLUTION INTRAMUSCULAR; SUBCUTANEOUS
Status: DISCONTINUED | OUTPATIENT
Start: 2025-06-01 | End: 2025-06-03

## 2025-06-01 RX ADMIN — CYANOCOBALAMIN 1000 MCG: 1000 INJECTION, SOLUTION INTRAMUSCULAR; SUBCUTANEOUS at 18:33

## 2025-06-01 NOTE — PROGRESS NOTES
Patient is not allowing staff to attempt lab draw this morning, he became agitated and was pulling away from staff when attempting to draw labs. MIKI made aware.

## 2025-06-01 NOTE — ASSESSMENT & PLAN NOTE
Patient has had rapid decline previously independent 6 weeks prior and oriented x 4 according to the daughter.  Deteriorated the past 6 weeks where now patient is fully dependent with all ADLs and is no longer oriented and just recently started having bowel and bladder incontinence  TSH is normal,  Hepatitis C, Lyme, heavy metals, were all negative  RPR is positive and treponema antibody screen was also positive  Followed with infectious disease on 5/30/2025  Was diagnosed as neurosyphilis.  Plan to do lumbar puncture  There was an attempt to do a lumbar puncture in the emergency department however was not able to be done  IR consulted for lumbar puncture  As per previous ID progress note -unless patient's CSF is completely normal we will likely treat the patient with neurosyphilis with high-dose IV PCN at 4,000,000 units every 4 hours for 14 days  ID evaluation appreciated  Neurology evaluation appreciated  MRI pending  Monitor off of antibiotics for now

## 2025-06-01 NOTE — ASSESSMENT & PLAN NOTE
75 y.o. male with history of alcohol abuse, HLD, CKD stage 3, and rapidly progressive memory deficits x8 weeks and new progressive ambulatory dysfunction x3 weeks with positive treponema/negative RPR concerning for possible neurosyphilis who presented to [facility] on 5/31/2025 due to unwitnessed fall.  Patient was found covered in urine/feces.  CT head negative for acute intracranial abnormalities.    Neurology was consulted due to rapidly progressive cognitive decline and ambulatory dysfunction.  Per patient's daughter, patient has had memory issues/confusion for the past 6 months, but over the past 8 weeks he has had a rapid decline in his cognition.  He initially was just confused about the year and season, but this has now progressed to needing assistance with almost all ADLs and his speech is frequently incomprehensible.  Approximately 3 weeks ago, patient's gait significantly declined and he is very unsteady.  Patient's daughter states that he is now shuffling and requires 1-2 people to help with transfers.  He never needed any assistive devices previously.  In the outpatient setting, his PCP checked syphilis screen which revealed positive Treponema, but RPR negative.  Based on ID notes, the serologic picture was consistent with prior syphilis, untreated or prior treatment.  Given no prior records of syphilis treatment, ID recommended treating for syphilis and considering the possibility of neurosyphilis given rapid decline in memory and ambulation.  Patient was scheduled for LP this coming Monday.    LP was attempted in the ER, but was unsuccessful.  Patient is confused with global aphasia and incomprehensible speech.  It is possible that patient has neurosyphilis, but cannot rule out other causes for rapidly progressive dementia.    Plan:  - MRI brain w/wo contrast NeuroQuant pending  - LP pending with IR  - CSF labs pending:  culture and gram stain, WBC with diff, RBC, glucose, protein, ME panel, cytology,  cytometry, JANELL virus, VDRL, CMV, cryptococcal, MS panel, autoimmune dementia profile  - Serum labs:  Pending: autoimmune dementia profile, B1, folate, B12, B6  WNL: CK, HIV, UA negative for infection  - Once vitamin levels are drawn, recommend supplementation  - Conservative management of delirium: minimize noise, maintain day/night cycle, provide frequent redirection, avoid restraints if possible, etc.  - PT/OT as able  - Frequent neuro checks. Continue to monitor and notify neurology with any changes.   - Medical management and supportive care per primary team. Correction of any metabolic or infectious disturbances

## 2025-06-01 NOTE — ASSESSMENT & PLAN NOTE
Lab Results   Component Value Date    EGFR 44 05/31/2025    EGFR 30 04/23/2025    EGFR Not performed when sex is unknown. 10/28/2020    CREATININE 1.52 (H) 05/31/2025    CREATININE 2.07 (H) 04/23/2025    CREATININE 1.38 (H) 10/28/2020

## 2025-06-01 NOTE — CONSULTS
Consultation - Neurology   Name: Mo Berry 75 y.o. male I MRN: [id number]  Unit/Bed#: [facility] 917-01 I Date of Admission: 5/31/2025   Date of Service: 6/1/2025 I Hospital Day: 1   Inpatient consult to Neurology  Consult performed by: Shawna Redman PA-C  Consult ordered by: Melvin Joyner MD        Physician Requesting Evaluation: Krista Vergara MD   Reason for Evaluation / Principal Problem: Rapidly progressive dementia and ambulatory dysfunction    Assessment & Plan  Rapidly progressive dementia (HCC)  75 y.o. male with history of alcohol abuse, HLD, CKD stage 3, and rapidly progressive memory deficits x8 weeks and new progressive ambulatory dysfunction x3 weeks with positive treponema/negative RPR concerning for possible neurosyphilis who presented to [facility] on 5/31/2025 due to unwitnessed fall.  Patient was found covered in urine/feces.  CT head negative for acute intracranial abnormalities.    Neurology was consulted due to rapidly progressive cognitive decline and ambulatory dysfunction.  Per patient's daughter, patient has had memory issues/confusion for the past 6 months, but over the past 8 weeks he has had a rapid decline in his cognition.  He initially was just confused about the year and season, but this has now progressed to needing assistance with almost all ADLs and his speech is frequently incomprehensible.  Approximately 3 weeks ago, patient's gait significantly declined and he is very unsteady.  Patient's daughter states that he is now shuffling and requires 1-2 people to help with transfers.  He never needed any assistive devices previously.  In the outpatient setting, his PCP checked syphilis screen which revealed positive Treponema, but RPR negative.  Based on ID notes, the serologic picture was consistent with prior syphilis, untreated or prior treatment.  Given no prior records of syphilis treatment, ID recommended treating for syphilis and considering the possibility of  neurosyphilis given rapid decline in memory and ambulation.  Patient was scheduled for LP this coming Monday.    LP was attempted in the ER, but was unsuccessful.  Patient is confused with global aphasia and incomprehensible speech.  It is possible that patient has neurosyphilis, but cannot rule out other causes for rapidly progressive dementia.    Plan:  - MRI brain w/wo contrast NeuroQuant pending  - LP pending with IR  - CSF labs pending:  culture and gram stain, WBC with diff, RBC, glucose, protein, ME panel, cytology, cytometry, JANELL virus, VDRL, CMV, cryptococcal, MS panel, autoimmune dementia profile  - Serum labs:  Pending: autoimmune dementia profile, B1, folate, B12, B6  WNL: CK, HIV, UA negative for infection  - Once vitamin levels are drawn, recommend supplementation  - Conservative management of delirium: minimize noise, maintain day/night cycle, provide frequent redirection, avoid restraints if possible, etc.  - PT/OT as able  - Frequent neuro checks. Continue to monitor and notify neurology with any changes.   - Medical management and supportive care per primary team. Correction of any metabolic or infectious disturbances  Syphilis  - Positive treponemal test although negative RPR consistent with either previously treated syphilis versus waning RPR with persistent positive treponemal test  - ID following; appreciate recommendations  - Given cognitive decline, need to consider possibility of neurosyphilis  - LP pending as above      Recommendations for outpatient neurological follow up have yet to be determined.    History of Present Illness   Mo Berry is a 75 y.o. male with history of alcohol abuse, HLD, CKD stage 3, and rapidly progressive memory deficits x8 weeks and ambulatory dysfunction x3 weeks with positive treponema/negative RPR concerning for possible neurosyphilis who presented to [facility] on 5/31/2025 due to unwitnessed fall.    History obtained per patient's daughter over the phone as well as  chart review.  Patient was recently seen by the infectious disease team on 5/30/2025.  Per their note, over the past few years, patient has developed worsening memory deficits and ambulatory dysfunction.  However, per patient's daughter, patient has only had memory issues/confusion for the past 6 months, but approximately 8 weeks ago he had a rapid decline in his cognition.  He initially was just confused about the year and season, but this has now progressed to needing assistance with almost all ADLs and speech is frequently incomprehensible.  Approximately 3 weeks ago, patient's gait significantly declined and he is very unsteady.  Patient's daughter states that he is now shuffling and requires 1-2 people to help with transfers.  He never needed any assistive devices previously.  As part of the evaluation for possible dementia, patient had syphilis screen completed outpatient by his PCP.  Labs revealed positive Treponema, but RPR negative.  Based on ID notes, the serologic picture was consistent with prior syphilis, untreated or prior treatment.  Given no prior records of syphilis treatment, ID recommended treating for syphilis and considering the possibility of neurosyphilis given rapid decline in memory and ambulation.  Patient was scheduled for LP this coming Monday.    Yesterday, patient was found on the floor in a fetal position covered in stool and urine.  It is unclear how long the patient was on the ground, but likely <2 hours.  His daughter gave him breakfast around 9:30 AM and then checked on him again around 11:15 AM, which is when he was on the ground.  Given his fall and rapid decline in memory/ambulation, patient was brought to the ED.    Upon arrival, /58.  Temperature 97.4 °F.  CT head negative for acute intracranial abnormalities.  CT cervical spine negative for fracture or malalignment.  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  Labs revealed WBC 3.93, hemoglobin 10.7, and UA positive for blood/glucose, but no nitrites or leukocytes.    LP was attempted in the ED, but after 3 attempts, unable to obtain CSF.    On exam today, patient is awake and sitting on the side of the bed.  He has a heavy [place]ien accent and is often difficult to understand.  He will smile and laugh, but cannot answer any questions.    Review of Systems   Unable to perform ROS: Mental status change        Medical History Review: I have reviewed the patient's PMH, PSH, Social History, Family History, Meds, and Allergies   Historical Information   Past Medical History[1]  Past Surgical History[2]  Social History[3]  E-Cigarette/Vaping    E-Cigarette Use Never User      E-Cigarette/Vaping Substances    Nicotine No     THC No     CBD No     Flavoring No     Other No     Unknown No      Family History[4]  Social History[5]    Current Facility-Administered Medications:     acetaminophen (TYLENOL) tablet 650 mg, Q6H PRN    ondansetron (ZOFRAN) injection 4 mg, Q6H PRN  Prior to Admission Medications   Prescriptions Last Dose Informant Patient Reported? Taking?   Incontinence Supply Disposable (Depend Undergarments) MISC   No No   Sig: Use every 4-6 hours as needed   acetaminophen (TYLENOL) 325 mg tablet  Self Yes No   Sig: Take 650 mg by mouth every 6 (six) hours as needed for mild pain   vitamin B-12 (VITAMIN B-12) 1,000 mcg tablet  Self Yes No   Sig: Take 1,000 mcg by mouth in the morning.      Facility-Administered Medications: None     Patient has no known allergies.    Objective :  Temp:  [97.3 °F (36.3 °C)-97.4 °F (36.3 °C)] 97.3 °F (36.3 °C)  HR:  [63-70] 64  BP: ()/(56-84) 118/73  Resp:  [13-20] 16  SpO2:  [94 %-100 %] 96 %  O2 Device: None (Room air)    Physical Exam  Vitals and nursing note reviewed.   Constitutional:       Appearance: Normal appearance.      Comments: Pleasantly confused male sitting comfortably on the edge of the bed in no acute  "distress   HENT:      Head: Normocephalic and atraumatic.      Mouth/Throat:      Mouth: Mucous membranes are moist.      Pharynx: Oropharynx is clear.     Eyes:      Extraocular Movements: Extraocular movements intact.      Conjunctiva/sclera: Conjunctivae normal.      Comments: Difficult to assess pupils due to patient closing eyes and resisting eyelid opening   Pulmonary:      Effort: Pulmonary effort is normal.     Skin:     General: Skin is warm and dry.     Neurological:      Mental Status: He is alert.     Neurological Exam  Mental Status  Alert.  Patient awake and alert, but confused  Patient's speech is mostly incomprehensible.  Appeared to have receptive and expressive aphasia.  Could not name any objects and had difficulty following commands.    Patient's daughter states that patient does have a heavy [place] accent at baseline, but usually speech is understandable.  Over the last few weeks, patient speaks \"gibberish\" per the daughter..    Cranial Nerves  CN II: Vision test: Difficult to assess pupils due to patient closing eyes and resisting eyelid opening.  CN III, IV, VI: Extraocular movements intact bilaterally.  Attempted to visualize pupils, but patient closed his eyelids each time the light was shown into the his eyes.  Patient resisted passive eyelid opening  Tracks examiner around the room.  No gaze preference.  Appeared to have a diminished EOMs with upward gaze.  No clear facial asymmetry at rest or when smiling socially  Difficulty assess remainder of cranial nerves due to AMS.    Motor      Unclear patient has increased tone in both arms or if he is resisting  5/5 strength bilateral upper extremities  Patient did not participate with lower extremity motor testing.    Sensory  Unable to assess due to mental status.    Coordination    Patient did not understand coordination testing.    Gait    Deferred for patient safety.        Lab Results: I have reviewed the following results:I have " "personally reviewed pertinent reports.  , CBC:   Results from last 7 days   Lab Units 05/31/25  1447   WBC Thousand/uL 3.93*   RBC Million/uL 3.68*   HEMOGLOBIN g/dL 10.7*   HEMATOCRIT % 33.7*   MCV fL 92   PLATELETS Thousands/uL 216   , BMP/CMP:   Results from last 7 days   Lab Units 05/31/25  1447   SODIUM mmol/L 141   POTASSIUM mmol/L 4.4   CHLORIDE mmol/L 110*   CO2 mmol/L 24   BUN mg/dL 28*   CREATININE mg/dL 1.52*   CALCIUM mg/dL 9.6   AST U/L 20   ALT U/L 20   ALK PHOS U/L 77   EGFR ml/min/1.73sq m 44   , Vitamin B12:   , HgBA1C:   , TSH:   , Coagulation:   Results from last 7 days   Lab Units 05/31/25  1815   INR  0.99   , Lipid Profile:   , Ammonia:   , Urinalysis:   Results from last 7 days   Lab Units 05/31/25  1510   COLOR UA  Yellow   CLARITY UA  Clear   SPEC GRAV UA  1.029   PH UA  5.5   LEUKOCYTES UA  Negative   NITRITE UA  Negative   GLUCOSE UA mg/dl 300 (3/10%)*   KETONES UA mg/dl Negative   BILIRUBIN UA  Negative   BLOOD UA  Small*   , Drug Screen:   , Medication Drug Levels:       Invalid input(s): \"CARBAMAZEPINE\", \"OXCARBAZEPINE\"  Recent Labs     05/31/25  1447   WBC 3.93*   HGB 10.7*   HCT 33.7*      SODIUM 141   K 4.4   *   CO2 24   BUN 28*   CREATININE 1.52*   GLUC 135     Imaging Results Review: I reviewed radiology reports from this admission including: CTH, CT cervical spine, CT CAP.      VTE Prophylaxis: VTE covered by:    None    and Sequential compression device (Venodyne)            [1]   Past Medical History:  Diagnosis Date    Alcohol intoxication (HCC) 08/17/2018    Anemia     Fall     Low back pain     For 25 years   [2] No past surgical history on file.  [3]   Social History  Tobacco Use    Smoking status: Former     Types: Cigarettes    Smokeless tobacco: Never    Tobacco comments:     Former smoker /quit 2010 as per [employer]   Vaping Use    Vaping status: Never Used   Substance and Sexual Activity    Alcohol use: Yes     Alcohol/week: 4.0 standard drinks of alcohol "     Types: 4 Cans of beer per week     Comment: on weekends    Drug use: No   [4]   Family History  Problem Relation Name Age of Onset    Hypertension Mother      Stroke Mother     [5]   Social History  Tobacco Use    Smoking status: Former     Types: Cigarettes    Smokeless tobacco: Never    Tobacco comments:     Former smoker /quit 2010 as per [employer]   Vaping Use    Vaping status: Never Used   Substance and Sexual Activity    Alcohol use: Yes     Alcohol/week: 4.0 standard drinks of alcohol     Types: 4 Cans of beer per week     Comment: on weekends    Drug use: No

## 2025-06-01 NOTE — PROGRESS NOTES
Progress Note - Hospitalist   Name: Mo Berry 75 y.o. male I MRN: [id number]  Unit/Bed#: [facility] 917-01 I Date of Admission: 5/31/2025   Date of Service: 6/1/2025 I Hospital Day: 1    Assessment & Plan  Syphilis  Patient has had rapid decline previously independent 6 weeks prior and oriented x 4 according to the daughter.  Deteriorated the past 6 weeks where now patient is fully dependent with all ADLs and is no longer oriented and just recently started having bowel and bladder incontinence  TSH is normal,  Hepatitis C, Lyme, heavy metals, were all negative  RPR is positive and treponema antibody screen was also positive  Followed with infectious disease on 5/30/2025  Was diagnosed as neurosyphilis.  Plan to do lumbar puncture  There was an attempt to do a lumbar puncture in the emergency department however was not able to be done  IR consulted for lumbar puncture  As per previous ID progress note -unless patient's CSF is completely normal we will likely treat the patient with neurosyphilis with high-dose IV PCN at 4,000,000 units every 4 hours for 14 days  ID evaluation appreciated  Neurology evaluation appreciated  MRI pending  Monitor off of antibiotics for now  Chronic kidney disease, stage 3b (HCC)  Lab Results   Component Value Date    EGFR 44 05/31/2025    EGFR 30 04/23/2025    EGFR Not performed when sex is unknown. 10/28/2020    CREATININE 1.52 (H) 05/31/2025    CREATININE 2.07 (H) 04/23/2025    CREATININE 1.38 (H) 10/28/2020   Creatinine currently at baseline    Encephalopathy acute  Plan noted above  Rapidly progressive dementia (HCC)  Rule out neurosyphilis.  Neurology evaluation appreciated.  MRI brain pending  Vitamin B12 deficiency  Recent vitamin B12 level is 87 showing vitamin B12 deficiency.  Vitamin B-12 supplementation  Started on supplementation by PCP as outpatient.    Will give one-time dose of IM vitamin B12    VTE Pharmacologic Prophylaxis:   Pharmacological DVT prophylaxis on hold due to  pending lumbar puncture    Mobility:   Basic Mobility Inpatient Raw Score: 12  JH-HLM Goal: 4: Move to chair/commode  JH-HLM Achieved: 5: Stand (1 or more minutes)  JH-HLM Goal achieved. Continue to encourage appropriate mobility.    Patient Centered Rounds: I performed bedside rounds with nursing staff today.   Discussions with Specialists or Other Care Team Provider:     Education and Discussions with Family / Patient: Updated  (daughter) via phone.    Current Length of Stay: 1 day(s)  Current Patient Status: Inpatient   Certification Statement: The patient will continue to require additional inpatient hospital stay due to pending MRI lumbar puncture  Discharge Plan: Anticipate discharge in >72 hrs to discharge location to be determined pending rehab evaluations.    Code Status: Level 1 - Full Code    Subjective   Patient seen and examined.  Patient denies any specific complaints.  Very pleasant.  Unable to answer the orientation questions    Objective :  Temp:  [97.3 °F (36.3 °C)-97.8 °F (36.6 °C)] 97.8 °F (36.6 °C)  HR:  [63-76] 76  BP: ()/(56-84) 90/57  Resp:  [13-20] 17  SpO2:  [96 %-100 %] 97 %  O2 Device: None (Room air)    There is no height or weight on file to calculate BMI.     Input and Output Summary (last 24 hours):     Intake/Output Summary (Last 24 hours) at 6/1/2025 1524  Last data filed at 5/31/2025 1854  Gross per 24 hour   Intake 500 ml   Output 500 ml   Net 0 ml       Physical Exam  Constitutional:       General: He is not in acute distress.  HENT:      Head: Normocephalic and atraumatic.      Nose: Nose normal.     Eyes:      General: No scleral icterus.      Cardiovascular:      Rate and Rhythm: Normal rate and regular rhythm.      Heart sounds: No murmur heard.  Pulmonary:      Effort: No respiratory distress.      Breath sounds: No stridor.   Abdominal:      General: There is no distension.      Tenderness: There is no abdominal tenderness.     Musculoskeletal:          General: No swelling.     Skin:     General: Skin is warm.      Coloration: Skin is not jaundiced.     Neurological:      Mental Status: He is alert.      Cranial Nerves: No cranial nerve deficit.      Motor: No weakness.      Comments: Not  oriented to time or place         Lines/Drains:              Lab Results: I have reviewed the following results:   Results from last 7 days   Lab Units 05/31/25  1447   WBC Thousand/uL 3.93*   HEMOGLOBIN g/dL 10.7*   HEMATOCRIT % 33.7*   PLATELETS Thousands/uL 216   SEGS PCT % 58   LYMPHO PCT % 32   MONO PCT % 7   EOS PCT % 2     Results from last 7 days   Lab Units 05/31/25  1447   SODIUM mmol/L 141   POTASSIUM mmol/L 4.4   CHLORIDE mmol/L 110*   CO2 mmol/L 24   BUN mg/dL 28*   CREATININE mg/dL 1.52*   ANION GAP mmol/L 7   CALCIUM mg/dL 9.6   ALBUMIN g/dL 4.0   TOTAL BILIRUBIN mg/dL 0.32   ALK PHOS U/L 77   ALT U/L 20   AST U/L 20   GLUCOSE RANDOM mg/dL 135     Results from last 7 days   Lab Units 05/31/25  1815   INR  0.99                   Recent Cultures (last 7 days):         Imaging Results Review: I reviewed radiology reports from this admission including: CT chest, CT abdomen/pelvis, and CT head.  Other Study Results Review: EKG was reviewed.     Last 24 Hours Medication List:     Current Facility-Administered Medications:     acetaminophen (TYLENOL) tablet 650 mg, Q6H PRN    ondansetron (ZOFRAN) injection 4 mg, Q6H PRN    Administrative Statements   Today, Patient Was Seen By: Krista Vergara MD      **Please Note: This note may have been constructed using a voice recognition system.**

## 2025-06-01 NOTE — ASSESSMENT & PLAN NOTE
In the setting of a patient with a progressive cognitive decline.  Differential diagnosis quite broad including infectious and noninfectious etiologies.  CT of the brain without a source  -Consult neurology  -Await lumbar puncture and additional CSF studies as per neurology  -Check MRI of the brain  -Additional workup as needed

## 2025-06-01 NOTE — CONSULTS
Consultation - Infectious Disease   Name: Mo Berry 75 y.o. male I MRN: [id number]  Unit/Bed#: [facility] 917-01 I Date of Admission: 5/31/2025   Date of Service: 6/1/2025 I Hospital Day: 1   Inpatient consult to Infectious Diseases  Consult performed by: Pierre Godwin MD  Consult ordered by: Krista Vergara MD        Physician Requesting Evaluation: Krista Vergara MD   Reason for Evaluation / Principal Problem: Syphilis    Assessment & Plan  Syphilis  With a positive treponemal test although negative RPR.  This is consistent with either previous treated syphilis versus waning RPR with persistent positive treponemal test.  Unable to obtain any detailed history as far as possible previous diagnosis and treatment.  With the patient's cognitive decline need to consider the possibility of neurosyphilis.  -Check lumbar puncture with CSF analysis including cell counts, protein, glucose, CSF VRDL  -If any new abnormality seen in the CSF we will need to treat empirically for possible neurosyphilis.  -Additional CSF studies as per neurology  -If CSF analysis completely normal, will need to treat for late latent syphilis with benzathine penicillin 2,400,000 units weekly IM x 3  -Follow-up HIV screen  Chronic kidney disease, stage 3b (HCC)  Renal function seems to be within baseline.  -Recheck BMP to make sure no worsening  -Volume management  -Dose adjust any antibiotics as needed  Encephalopathy acute  In the setting of a patient with a progressive cognitive decline.  Differential diagnosis quite broad including infectious and noninfectious etiologies.  CT of the brain without a source  -Consult neurology  -Await lumbar puncture and additional CSF studies as per neurology  -Check MRI of the brain  -Additional workup as needed    I have discussed with the primary service the above plan to monitor off all antibiotics awaiting additional data.  The primary service agrees with the plan.    Antibiotics:  None    History of  Present Illness   Mo Berry is a 75 y.o. year old male with chronic kidney disease who we are asked to assist with management of possible neurosyphilis.  The patient's developed worsening memory and ambulatory difficulties over the last few years.  He has undergone workup for the dementia which include a syphilis screen for which the treponemal test came back positive although the RPR was negative.  Apparently his cognitive decline has sped up quite a bit more recently and he has developed more unsteadiness while walking.  He was referred to the hospital for admission to expedite the workup in the setting of progressive neurologic decline.  He is awake and attempts to answer simple questions appropriately.  He denies any headache or stiff neck, denies any nausea vomiting or diarrhea, denies any fever chills or sweats, denies any dysuria or hematuria.  He has not any recent visual changes.  He is not having any chest pain or abdominal pain.  He does admit to chronic low back pain.    A complete review of systems is negative other than that noted in the HPI.    Medical History Review: I have reviewed the patient's PMH, PSH, Social History, Family History, Meds, and Allergies     Objective :  Temp:  [97.3 °F (36.3 °C)-97.4 °F (36.3 °C)] 97.3 °F (36.3 °C)  HR:  [63-70] 64  BP: ()/(56-84) 118/73  Resp:  [13-20] 16  SpO2:  [94 %-100 %] 96 %  O2 Device: None (Room air)    General:  No acute distress but confused  Psychiatric:  Awake and alert, confused  Pulmonary:  Normal respiratory excursion without accessory muscle use  Abdomen:  Soft, nontender  Extremities:  No edema  Skin:  No rashes      Lab Results: I have reviewed the following results:  Results from last 7 days   Lab Units 05/31/25  1447   WBC Thousand/uL 3.93*   HEMOGLOBIN g/dL 10.7*   PLATELETS Thousands/uL 216     Results from last 7 days   Lab Units 05/31/25  1447   SODIUM mmol/L 141   POTASSIUM mmol/L 4.4   CHLORIDE mmol/L 110*   CO2 mmol/L 24   BUN  mg/dL 28*   CREATININE mg/dL 1.52*   EGFR ml/min/1.73sq m 44   CALCIUM mg/dL 9.6   AST U/L 20   ALT U/L 20   ALK PHOS U/L 77   ALBUMIN g/dL 4.0                           Imaging Results Review: I personally reviewed the following image studies in PACS and associated radiology reports: CT chest, CT abdomen/pelvis, and CT head. My interpretation of the radiology images/reports is: Emphysematous changes in the lung without consolidation.  No acute intra-abdominal or pelvic process.  No acute intracranial process..

## 2025-06-01 NOTE — ASSESSMENT & PLAN NOTE
Recent vitamin B12 level is 87 showing vitamin B12 deficiency.  Vitamin B-12 supplementation  Started on supplementation by PCP as outpatient.    Will give one-time dose of IM vitamin B12

## 2025-06-01 NOTE — SPEECH THERAPY NOTE
Speech Language/Pathology  Speech-Language Pathology Bedside Swallow Evaluation        Patient Name: Mo Berry    Today's Date: 6/1/2025     Problem List  Principal Problem:    Syphilis  Active Problems:    Chronic kidney disease, stage 3b (HCC)    Encephalopathy acute         Past Medical History  Past Medical History[1]    Past Surgical History  Past Surgical History[2]    Summary    Pt presents with oropharyngeal swallow that appears WNL. Mastication was prompt, and there were no overt s/s of aspiration. Current diet (regular/thin liquids) appears appropriate. Risk of aspiration appears low. No further dysphagia intervention indicated at this time. Pt may benefit from motor speech and language assessment.      Recommendations:   Diet: regular diet and thin liquids   Meds: whole with liquid   Frequent Oral care  Aspiration precautions and compensatory swallowing strategies: upright posture and only feed when fully alert  Other Recommendations/ considerations: No further dysphagia intervention indicated. Consider motor speech/language assessment, given mumbled speech and recent cognitive decline.        Current Medical Status  Copied from admission/physician notes:  Mo Berry is a 75 y.o. year old male with chronic kidney disease who we are asked to assist with management of possible neurosyphilis.  The patient's developed worsening memory and ambulatory difficulties over the last few years.  He has undergone workup for the dementia which include a syphilis screen for which the treponemal test came back positive although the RPR was negative.  Apparently his cognitive decline has sped up quite a bit more recently and he has developed more unsteadiness while walking.  He was referred to the hospital for admission to expedite the workup in the setting of progressive neurologic decline.  He is awake and attempts to answer simple questions appropriately.  He denies any headache or stiff neck, denies any nausea  vomiting or diarrhea, denies any fever chills or sweats, denies any dysuria or hematuria.  He has not any recent visual changes.  He is not having any chest pain or abdominal pain.  He does admit to chronic low back pain.    Past medical history:   Please see H&P for details    Special Studies:  CT head-  No acute intracranial abnormality. Chronic microangiopathic changes.    CT chest-  No posttraumatic injury visualized.  Mild increased attenuation of the mesentery with small lymph nodes suggesting mesenteric panniculitis.  Mild emphysema and coronary calcifications.     Social/Education/Vocational Hx:  Pt lives with family    Swallow Information   Current Risks for Dysphagia & Aspiration: progressive cognitive impairment  Current Symptoms/Concerns: no specific complaints  Current Diet: regular diet and thin liquids   Baseline Diet: regular diet and thin liquids    Baseline Assessment   Behavior/Cognition: alert  Speech/Language Status: able to follow commands and limited verbal output, speech is mumbled with reduced intelligibility  Patient Positioning: upright in bed     Swallow Mechanism Exam   Facial: symmetrical  Labial: WFL  Lingual: decreased ROM  Velum: unable to visualize  Mandible:  decreased ROM  Dentition: full dentures  Vocal quality:clear/adequate   Volitional Cough: strong/productive   Respiratory: Room air    Consistencies Assessed and Performance   Consistencies Administered: thin liquids, puree, soft solids, and hard solids  -chicken breast with gravy, cooked baby carrots, mashed potatoes, lemon meringue pie, thin liquids via straw    Oral Stage: Adequate bolus retrieval and draw from straw. Mastication appeared prompt. No residue or pocketing. Adequate lip seal.     Pharyngeal Stage: Hyolaryngeal elevation observed and palpated. There were no overt s/s of aspiration.       Esophageal Concerns: none reported      Results Reviewed with: patient and RN   Dysphagia Goals: none at this time  Discharge  recommendation: no follow up needed for swallowing             [1]   Past Medical History:  Diagnosis Date    Alcohol intoxication (HCC) 08/17/2018    Anemia     Fall     Low back pain     For 25 years   [2] No past surgical history on file.

## 2025-06-01 NOTE — PLAN OF CARE
Problem: SAFETY ADULT  Goal: Patient will remain free of falls  Description: INTERVENTIONS:  - Educate patient/family on patient safety including physical limitations  - Instruct patient to call for assistance with activity   - Consider consulting OT/PT to assist with strengthening/mobility based on AM PAC & JH-HLM score  - Consult OT/PT to assist with strengthening/mobility   - Keep Call bell within reach  - Keep bed low and locked with side rails adjusted as appropriate  - Keep care items and personal belongings within reach  - Initiate and maintain comfort rounds  - Make Fall Risk Sign visible to staff  - Offer Toileting every  Hours, in advance of need  - Initiate/Maintain alarm  - Obtain necessary fall risk management equipment:     Problem: DISCHARGE PLANNING  Goal: Discharge to home or other facility with appropriate resources  Description: INTERVENTIONS:  - Identify barriers to discharge w/patient and caregiver  - Arrange for needed discharge resources and transportation as appropriate  - Identify discharge learning needs (meds, wound care, etc.)  - Arrange for interpretive services to assist at discharge as needed  - Refer to Case Management Department for coordinating discharge planning if the patient needs post-hospital services based on physician/advanced practitioner order or complex needs related to functional status, cognitive ability, or social support system  Outcome: Progressing     - Apply yellow socks and bracelet for high fall risk patients  - Consider moving patient to room near nurses station  Outcome: Progressing

## 2025-06-01 NOTE — ASSESSMENT & PLAN NOTE
Renal function seems to be within baseline.  -Recheck BMP to make sure no worsening  -Volume management  -Dose adjust any antibiotics as needed

## 2025-06-01 NOTE — ASSESSMENT & PLAN NOTE
- Positive treponemal test although negative RPR consistent with either previously treated syphilis versus waning RPR with persistent positive treponemal test  - ID following; appreciate recommendations  - Given cognitive decline, need to consider possibility of neurosyphilis  - LP pending as above

## 2025-06-01 NOTE — CASE MANAGEMENT
Case Management Assessment & Discharge Planning Note    Patient name Mo Berry  Location [facility] 917-01 MRN [id number]  : 1950 Date 2025       Current Admission Date: 2025  Current Admission Diagnosis:Syphilis   Patient Active Problem List    Diagnosis Date Noted    Encephalopathy acute 2025    Urinary incontinence 2025    Chronic kidney disease, stage 3b (HCC) 2025    B12 deficiency 2025    Syphilis 2025    Hypercholesterolemia 2025    Memory deficit 2025    Tremor 2025    Primary osteoarthritis involving multiple joints 2025    Anemia of chronic disease 10/05/2017      LOS (days): 1  Geometric Mean LOS (GMLOS) (days):   Days to GMLOS:     OBJECTIVE:    Risk of Unplanned Readmission Score: 10.17         Current admission status: Inpatient       Preferred Pharmacy:   [id number] - [place] - [address]  [address]  [place] [address]  Phone: [phone] Fax: [phone]    Primary Care Provider: Selvin Dumont DO    Primary Insurance: MEDICARE  Secondary Insurance: [facility]    ASSESSMENT:  Active Health Care Proxies    There are no active Health Care Proxies on file.          Readmission Root Cause  30 Day Readmission: No    Patient Information  Admitted from:: Home  Mental Status: Alert  During Assessment patient was accompanied by: Other-Comment  Assessment information provided by:: Daughter  Primary Caregiver: Self  Support Systems: Self, Children  County of Residence: [place]  What city do you live in?: [place]  Home entry access options. Select all that apply.: Stairs  Number of steps to enter home.: 3  Type of Current Residence: 3 story home  Upon entering residence, is there a bedroom on the main floor (no further steps)?: No  A bedroom is located on the following floor levels of residence (select all that apply):: Basement  Upon entering residence, is there a bathroom on the  main floor (no further steps)?: Yes  Number of steps to basement from main floor: One Flight  Living Arrangements: Lives w/ Daughter  Is patient a ?: No    Activities of Daily Living Prior to Admission  Functional Status: Independent  Completes ADLs independently?: Yes  Ambulates independently?: Yes  Does patient use assisted devices?: No  Does patient currently own DME?: No  Does patient have a history of Outpatient Therapy (PT/OT)?: No  Does the patient have a history of Short-Term Rehab?: No  Does patient have a history of HHC?: No  Does patient currently have HHC?: No         Patient Information Continued  Income Source: Pension/long term  Does patient have prescription coverage?: Yes  Can the patient afford their medications and any related supplies (such as glucometers or test strips)?: Yes  Does patient receive dialysis treatments?: No  Does patient have a history of substance abuse?: No  Does patient have a history of Mental Health Diagnosis?: No     Means of Transportation  [facility]          DISCHARGE DETAILS:    Discharge planning discussed with:: Patient's daughter Ludivina  Freedom of Choice: Yes     CM contacted family/caregiver?: Yes  Were Treatment Team discharge recommendations reviewed with patient/caregiver?: Yes  Did patient/caregiver verbalize understanding of patient care needs?: Yes  Were patient/caregiver advised of the risks associated with not following Treatment Team discharge recommendations?: Yes    Contacts  Patient Contacts: Ludivina Berry  Relationship to Patient:: Family  Contact Method: Phone  Phone Number: [phone]  Reason/Outcome: Continuity of Care, Emergency Contact, Referral, Discharge Planning       Additional Comments: CM student spoke with the patient's daughterLudivina via phone to complete open assessment. Patient lives with his daughter in a 3-story home where the patient resides in the basement of the home. Daughter reports  that patient was previously independent with ADLs but has recently required assistance with some tasks. Patient does not drive and relies on family for transportation to appoitments. Daughter expressed concern regarding her father's rapid decline over the past few weeks. CM will remain available for discharge needs.

## 2025-06-01 NOTE — ASSESSMENT & PLAN NOTE
With a positive treponemal test although negative RPR.  This is consistent with either previous treated syphilis versus waning RPR with persistent positive treponemal test.  Unable to obtain any detailed history as far as possible previous diagnosis and treatment.  With the patient's cognitive decline need to consider the possibility of neurosyphilis.  -Check lumbar puncture with CSF analysis including cell counts, protein, glucose, CSF VRDL  -If any new abnormality seen in the CSF we will need to treat empirically for possible neurosyphilis.  -Additional CSF studies as per neurology  -If CSF analysis completely normal, will need to treat for late latent syphilis with benzathine penicillin 2,400,000 units weekly IM x 3  -Follow-up HIV screen

## 2025-06-02 ENCOUNTER — APPOINTMENT (INPATIENT)
Dept: RADIOLOGY | Facility: HOSPITAL | Age: 75
DRG: 884 | End: 2025-06-02
Payer: MEDICARE

## 2025-06-02 ENCOUNTER — APPOINTMENT (INPATIENT)
Dept: NEUROLOGY | Facility: CLINIC | Age: 75
DRG: 884 | End: 2025-06-02
Payer: MEDICARE

## 2025-06-02 PROBLEM — R41.82 ALTERED MENTAL STATUS: Status: ACTIVE | Noted: 2025-06-02

## 2025-06-02 LAB
ANION GAP SERPL CALCULATED.3IONS-SCNC: 10 MMOL/L (ref 4–13)
APPEARANCE CSF: CLEAR
APTT PPP: 27 SECONDS (ref 23–34)
BUN SERPL-MCNC: 25 MG/DL (ref 5–25)
CALCIUM SERPL-MCNC: 9.5 MG/DL (ref 8.4–10.2)
CHLORIDE SERPL-SCNC: 106 MMOL/L (ref 96–108)
CO2 SERPL-SCNC: 21 MMOL/L (ref 21–32)
CREAT SERPL-MCNC: 1.37 MG/DL (ref 0.6–1.3)
CRYPTOC AG CSF QL IA: NEGATIVE
ERYTHROCYTE [DISTWIDTH] IN BLOOD BY AUTOMATED COUNT: 13.4 % (ref 11.6–15.1)
FOLATE SERPL-MCNC: 11.3 NG/ML
GFR SERPL CREATININE-BSD FRML MDRD: 50 ML/MIN/1.73SQ M
GLUCOSE CSF-MCNC: 64 MG/DL (ref 40–70)
GLUCOSE SERPL-MCNC: 166 MG/DL (ref 65–140)
GRAM STN SPEC: NORMAL
HCT VFR BLD AUTO: 33.3 % (ref 36.5–49.3)
HGB BLD-MCNC: 10.6 G/DL (ref 12–17)
INR PPP: 1.01 (ref 0.85–1.19)
LYMPHOCYTES NFR CSF MANUAL: 80 %
MCH RBC QN AUTO: 28.8 PG (ref 26.8–34.3)
MCHC RBC AUTO-ENTMCNC: 32.4 G/DL (ref 31.4–37.4)
MCV RBC AUTO: 91 FL (ref 82–98)
MONOS+MACROS CSF MANUAL: 20 %
PLATELET # BLD AUTO: 204 THOUSANDS/UL (ref 149–390)
PLATELET # BLD AUTO: 204 THOUSANDS/UL (ref 149–390)
PMV BLD AUTO: 10.9 FL (ref 8.9–12.7)
PMV BLD AUTO: 10.9 FL (ref 8.9–12.7)
POTASSIUM SERPL-SCNC: 4.3 MMOL/L (ref 3.5–5.3)
PROT CSF-MCNC: 43 MG/DL (ref 15–45)
PROTHROMBIN TIME: 13.6 SECONDS (ref 12.3–15)
RBC # BLD AUTO: 3.68 MILLION/UL (ref 3.88–5.62)
RBC # CSF MANUAL: 0 UL (ref 0–10)
SODIUM SERPL-SCNC: 137 MMOL/L (ref 135–147)
TOTAL CELLS COUNTED BLD: NO
TOTAL CELLS COUNTED SPEC: 10
TUBE # CSF: 4
VIT B12 SERPL-MCNC: >7500 PG/ML (ref 180–914)
WBC # BLD AUTO: 4.64 THOUSAND/UL (ref 4.31–10.16)
WBC # CSF AUTO: 1 /UL (ref 0–5)

## 2025-06-02 PROCEDURE — G0545 PR INHERENT VISIT TO INPT: HCPCS | Performed by: INTERNAL MEDICINE

## 2025-06-02 PROCEDURE — 99232 SBSQ HOSP IP/OBS MODERATE 35: CPT | Performed by: FAMILY MEDICINE

## 2025-06-02 PROCEDURE — 86341 ISLET CELL ANTIBODY: CPT | Performed by: PHYSICIAN ASSISTANT

## 2025-06-02 PROCEDURE — 86255 FLUORESCENT ANTIBODY SCREEN: CPT | Performed by: PHYSICIAN ASSISTANT

## 2025-06-02 PROCEDURE — 87798 DETECT AGENT NOS DNA AMP: CPT

## 2025-06-02 PROCEDURE — 83873 ASSAY OF CSF PROTEIN: CPT

## 2025-06-02 PROCEDURE — 82746 ASSAY OF FOLIC ACID SERUM: CPT | Performed by: PHYSICIAN ASSISTANT

## 2025-06-02 PROCEDURE — 84207 ASSAY OF VITAMIN B-6: CPT | Performed by: PHYSICIAN ASSISTANT

## 2025-06-02 PROCEDURE — 82945 GLUCOSE OTHER FLUID: CPT

## 2025-06-02 PROCEDURE — 87496 CYTOMEG DNA AMP PROBE: CPT

## 2025-06-02 PROCEDURE — 84157 ASSAY OF PROTEIN OTHER: CPT

## 2025-06-02 PROCEDURE — 85730 THROMBOPLASTIN TIME PARTIAL: CPT | Performed by: PHYSICIAN ASSISTANT

## 2025-06-02 PROCEDURE — 62328 DX LMBR SPI PNXR W/FLUOR/CT: CPT

## 2025-06-02 PROCEDURE — 82042 OTHER SOURCE ALBUMIN QUAN EA: CPT

## 2025-06-02 PROCEDURE — 97167 OT EVAL HIGH COMPLEX 60 MIN: CPT

## 2025-06-02 PROCEDURE — 88312 SPECIAL STAINS GROUP 1: CPT | Performed by: STUDENT IN AN ORGANIZED HEALTH CARE EDUCATION/TRAINING PROGRAM

## 2025-06-02 PROCEDURE — 80048 BASIC METABOLIC PNL TOTAL CA: CPT | Performed by: FAMILY MEDICINE

## 2025-06-02 PROCEDURE — 87483 CNS DNA AMP PROBE TYPE 12-25: CPT | Performed by: PHYSICIAN ASSISTANT

## 2025-06-02 PROCEDURE — 88184 FLOWCYTOMETRY/ TC 1 MARKER: CPT

## 2025-06-02 PROCEDURE — 85027 COMPLETE CBC AUTOMATED: CPT | Performed by: FAMILY MEDICINE

## 2025-06-02 PROCEDURE — 82040 ASSAY OF SERUM ALBUMIN: CPT

## 2025-06-02 PROCEDURE — 82607 VITAMIN B-12: CPT | Performed by: PHYSICIAN ASSISTANT

## 2025-06-02 PROCEDURE — 89051 BODY FLUID CELL COUNT: CPT

## 2025-06-02 PROCEDURE — 97163 PT EVAL HIGH COMPLEX 45 MIN: CPT

## 2025-06-02 PROCEDURE — 88108 CYTOPATH CONCENTRATE TECH: CPT | Performed by: STUDENT IN AN ORGANIZED HEALTH CARE EDUCATION/TRAINING PROGRAM

## 2025-06-02 PROCEDURE — 84425 ASSAY OF VITAMIN B-1: CPT | Performed by: PHYSICIAN ASSISTANT

## 2025-06-02 PROCEDURE — 87899 AGENT NOS ASSAY W/OPTIC: CPT

## 2025-06-02 PROCEDURE — 85610 PROTHROMBIN TIME: CPT | Performed by: PHYSICIAN ASSISTANT

## 2025-06-02 PROCEDURE — 99232 SBSQ HOSP IP/OBS MODERATE 35: CPT | Performed by: INTERNAL MEDICINE

## 2025-06-02 PROCEDURE — 86592 SYPHILIS TEST NON-TREP QUAL: CPT

## 2025-06-02 PROCEDURE — 95816 EEG AWAKE AND DROWSY: CPT

## 2025-06-02 PROCEDURE — 85049 AUTOMATED PLATELET COUNT: CPT | Performed by: PHYSICIAN ASSISTANT

## 2025-06-02 PROCEDURE — 88185 FLOWCYTOMETRY/TC ADD-ON: CPT

## 2025-06-02 PROCEDURE — 82784 ASSAY IGA/IGD/IGG/IGM EACH: CPT

## 2025-06-02 PROCEDURE — 83916 OLIGOCLONAL BANDS: CPT

## 2025-06-02 PROCEDURE — 89050 BODY FLUID CELL COUNT: CPT

## 2025-06-02 PROCEDURE — 84165 PROTEIN E-PHORESIS SERUM: CPT

## 2025-06-02 PROCEDURE — 87070 CULTURE OTHR SPECIMN AEROBIC: CPT

## 2025-06-02 RX ADMIN — CYANOCOBALAMIN TAB 500 MCG 1000 MCG: 500 TAB at 09:01

## 2025-06-02 RX ADMIN — FOLIC ACID 1 MG: 1 TABLET ORAL at 09:01

## 2025-06-02 NOTE — PROGRESS NOTES
Progress Note - Hospitalist   Name: Mo Berry 75 y.o. male I MRN: [id number]  Unit/Bed#: [facility] 917-01 I Date of Admission: 5/31/2025   Date of Service: 6/2/2025 I Hospital Day: 2    Assessment & Plan  Syphilis  Patient has had rapid decline previously independent 6 weeks prior and oriented x 4 according to the daughter.  Deteriorated the past 6 weeks where now patient is fully dependent with all ADLs and is no longer oriented and just recently started having bowel and bladder incontinence  TSH is normal,  Hepatitis C, Lyme, heavy metals, were all negative  RPR is positive and treponema antibody screen was also positive  Followed with infectious disease on 5/30/2025  Was diagnosed as neurosyphilis.  Plan to do lumbar puncture  There was an attempt to do a lumbar puncture in the emergency department however was not able to be done  IR consulted for lumbar puncture  As per previous ID progress note -unless patient's CSF is completely normal we will likely treat the patient with neurosyphilis with high-dose IV PCN at 4,000,000 units every 4 hours for 14 days  ID evaluation appreciated  Neurology evaluation appreciated  MRI pending  Monitor off of antibiotics for now  Status post lumbar puncture on 6/2.  CSF total count within normal limits.  Protein within normal limits.  Follow-up on the cultures and VDRL.  Monitor off of antibiotics  ID evaluation appreciated  Chronic kidney disease, stage 3b (HCC)  Lab Results   Component Value Date    EGFR 50 06/02/2025    EGFR 44 05/31/2025    EGFR 30 04/23/2025    CREATININE 1.37 (H) 06/02/2025    CREATININE 1.52 (H) 05/31/2025    CREATININE 2.07 (H) 04/23/2025   Creatinine currently at baseline  Trend creatinine  Encephalopathy acute  Plan noted above  Rapidly progressive dementia (HCC)  Rule out neurosyphilis.  Neurology evaluation appreciated.  MRI brain pending  Follow-up on the CSF results  Vitamin B12 deficiency  Recent vitamin B12 level is 87 showing vitamin B12  deficiency.  Vitamin B-12 supplementation  Started on supplementation by PCP as outpatient.     B12 level noted to be elevated  Anemia  Anemia noted.  Hemoglobin remains stable.  Noted to have vitamin B 12 deficiency on previous blood work.  Today vitamin B12 level is elevated.  Hold further supplementation    VTE Pharmacologic Prophylaxis:   Moderate Risk (Score 3-4) - Pharmacological DVT Prophylaxis Ordered: enoxaparin (Lovenox).    Mobility:   Basic Mobility Inpatient Raw Score: 7  JH-HLM Goal: 2: Bed activities/Dependent transfer  JH-HLM Achieved: 3: Sit at edge of bed  JH-HLM Goal achieved. Continue to encourage appropriate mobility.    Patient Centered Rounds: I performed bedside rounds with nursing staff today.   Discussions with Specialists or Other Care Team Provider:     Education and Discussions with Family / Patient: Updated  (daughter) via phone.    Current Length of Stay: 2 day(s)  Current Patient Status: Inpatient   Certification Statement: The patient will continue to require additional inpatient hospital stay due to encephalopathy  Discharge Plan:     Code Status: Level 1 - Full Code    Subjective   Patient seen and examined.  Patient is very pleasant.  Not oriented to time or place  Follow commands intermittently    Objective :  Temp:  [97 °F (36.1 °C)-98.3 °F (36.8 °C)] 97.4 °F (36.3 °C)  HR:  [75-92] 80  BP: (106-135)/(71-84) 106/71  Resp:  [18] 18  SpO2:  [96 %-97 %] 96 %  O2 Device: None (Room air)    There is no height or weight on file to calculate BMI.     Input and Output Summary (last 24 hours):   No intake or output data in the 24 hours ending 06/02/25 7053    Physical Exam  Constitutional:       General: He is not in acute distress.     Appearance: Normal appearance. He is not ill-appearing.   HENT:      Head: Normocephalic and atraumatic.      Nose: No congestion.     Eyes:      General: No scleral icterus.      Cardiovascular:      Rate and Rhythm: Normal rate and regular  rhythm.      Heart sounds: No murmur heard.  Pulmonary:      Effort: No respiratory distress.   Abdominal:      General: There is no distension.     Musculoskeletal:         General: No swelling.     Skin:     Coloration: Skin is not jaundiced.     Neurological:      Mental Status: He is alert.      Comments: Patient is alert awake.  Not oriented to time or place.  Follows commands intermittently  Strength appears equal bilateral.  No focal neurological deficits noted         Lines/Drains:              Lab Results: I have reviewed the following results:   Results from last 7 days   Lab Units 06/02/25  0956 05/31/25  1447   WBC Thousand/uL 4.64 3.93*   HEMOGLOBIN g/dL 10.6* 10.7*   HEMATOCRIT % 33.3* 33.7*   PLATELETS Thousands/uL 204  204 216   SEGS PCT %  --  58   LYMPHO PCT %  --  32   MONO PCT %  --  7   EOS PCT %  --  2     Results from last 7 days   Lab Units 06/02/25  0930 05/31/25  1447   SODIUM mmol/L 137 141   POTASSIUM mmol/L 4.3 4.4   CHLORIDE mmol/L 106 110*   CO2 mmol/L 21 24   BUN mg/dL 25 28*   CREATININE mg/dL 1.37* 1.52*   ANION GAP mmol/L 10 7   CALCIUM mg/dL 9.5 9.6   ALBUMIN g/dL  --  4.0   TOTAL BILIRUBIN mg/dL  --  0.32   ALK PHOS U/L  --  77   ALT U/L  --  20   AST U/L  --  20   GLUCOSE RANDOM mg/dL 166* 135     Results from last 7 days   Lab Units 06/02/25  0930   INR  1.01                   Recent Cultures (last 7 days):               Last 24 Hours Medication List:     Current Facility-Administered Medications:     acetaminophen (TYLENOL) tablet 650 mg, Q6H PRN    [Held by provider] cyanocobalamin (VITAMIN B-12) tablet 1,000 mcg, Daily    [Held by provider] cyanocobalamin injection 1,000 mcg, Q30 Days    [Held by provider] folic acid (FOLVITE) tablet 1 mg, Daily    ondansetron (ZOFRAN) injection 4 mg, Q6H PRN    Administrative Statements   Today, Patient Was Seen By: Krista Vergara MD      **Please Note: This note may have been constructed using a voice recognition system.**

## 2025-06-02 NOTE — PLAN OF CARE
Problem: PHYSICAL THERAPY ADULT  Goal: Performs mobility at highest level of function for planned discharge setting.  See evaluation for individualized goals.  Description: Treatment/Interventions: Functional transfer training, LE strengthening/ROM, Therapeutic exercise, Endurance training, Cognitive reorientation, Bed mobility, Continued evaluation, OT          See flowsheet documentation for full assessment, interventions and recommendations.  Note: Prognosis: Guarded  Problem List: Decreased strength, Decreased endurance, Impaired balance, Decreased mobility, Decreased coordination, Decreased cognition, Impaired judgement, Decreased safety awareness  Assessment: Bed level assessment initiated. Pt is 75 y.o. male admitted with hx rapid decline and Dx of syphilis, Encephalopathy acute; undergoing w/u. Pt 's comorbidities affecting POC include:  anemia, Low back pain, and CKD and personal factors of: steps in the house. Pt's clinical presentation is currently  unstable/unpredictable which is evident in ongoing telem monitoring, additional testing pending (MRI and LP), abn lab values and ongoing management of current Dx. Pt presents w/ overall weakness, decreased endurance, impaired cognition, impaired sitting balance, bed mob deficits and inability to progress to OOB mobility at this time. Will cont to follow pt in PT for bed level activities at this time w/ progression to OOB mobility trials when clinically appropriate (PT to see the pt at that point). D/C recommendations are listed below; will follow  Barriers to Discharge: Inaccessible home environment     Rehab Resource Intensity Level, PT: II (Moderate Resource Intensity)    See flowsheet documentation for full assessment.

## 2025-06-02 NOTE — ASSESSMENT & PLAN NOTE
Rule out neurosyphilis.  Neurology evaluation appreciated.  MRI brain pending  Follow-up on the CSF results

## 2025-06-02 NOTE — ASSESSMENT & PLAN NOTE
Patient has had rapid decline previously independent 6 weeks prior and oriented x 4 according to the daughter.  Deteriorated the past 6 weeks where now patient is fully dependent with all ADLs and is no longer oriented and just recently started having bowel and bladder incontinence  TSH is normal,  Hepatitis C, Lyme, heavy metals, were all negative  RPR is positive and treponema antibody screen was also positive  Followed with infectious disease on 5/30/2025  Was diagnosed as neurosyphilis.  Plan to do lumbar puncture  There was an attempt to do a lumbar puncture in the emergency department however was not able to be done  IR consulted for lumbar puncture  As per previous ID progress note -unless patient's CSF is completely normal we will likely treat the patient with neurosyphilis with high-dose IV PCN at 4,000,000 units every 4 hours for 14 days  ID evaluation appreciated  Neurology evaluation appreciated  MRI pending  Monitor off of antibiotics for now  Status post lumbar puncture on 6/2.  CSF total count within normal limits.  Protein within normal limits.  Follow-up on the cultures and VDRL.  Monitor off of antibiotics  ID evaluation appreciated

## 2025-06-02 NOTE — ASSESSMENT & PLAN NOTE
Lab Results   Component Value Date    EGFR 50 06/02/2025    EGFR 44 05/31/2025    EGFR 30 04/23/2025    CREATININE 1.37 (H) 06/02/2025    CREATININE 1.52 (H) 05/31/2025    CREATININE 2.07 (H) 04/23/2025   Creatinine currently at baseline  Trend creatinine

## 2025-06-02 NOTE — PROGRESS NOTES
Progress Note - Infectious Disease   Name: Mo Berry 75 y.o. male I MRN: [id number]  Unit/Bed#: [facility] 917-01 I Date of Admission: 5/31/2025   Date of Service: 6/2/2025 I Hospital Day: 2     Assessment & Plan  Syphilis  With a positive treponemal test although negative RPR.  This is consistent with either previous treated syphilis versus waning RPR with persistent positive treponemal test.  Unable to obtain any detailed history as far as possible previous diagnosis and treatment.  With the patient's cognitive decline need to consider the possibility of neurosyphilis.  HIV screen negative.  -Hold off on empiric therapy for now to maximize lumbar puncture yield  -Check lumbar puncture with CSF analysis including cell counts, protein, glucose, CSF VRDL  -If any new abnormality seen in the CSF we will need to treat empirically for possible neurosyphilis.  -Additional CSF studies as per neurology  -If CSF analysis completely normal, will need to treat for late latent syphilis with benzathine penicillin 2,400,000 units weekly IM x 3 weeks  Encephalopathy acute  In the setting of a patient with a progressive cognitive decline, with more acute deterioration in the last 8 weeks.  Differential diagnosis quite broad including infectious and noninfectious etiologies.  CT of the brain without a source.  HIV screen negative.  -Appreciate neurology recommendations  -Await lumbar puncture and additional CSF studies as per neurology  - Follow-up MRI of the brain  -Additional workup as needed  Chronic kidney disease, stage 3b (HCC)  Renal function seems to be within baseline.  -Recheck BMP to make sure no worsening  -Volume management  -Dose adjust any antibiotics as needed  Vitamin B12 deficiency  Management per primary team    I have discussed the above management plan in detail with the primary service.  They agree with plan to follow-up lumbar puncture and MRI brain.    Antibiotics:  None    Subjective   Today patient is  oriented to self but not place or year.  He was able to answer some basic questions. Patient has no fever, chills.    Objective :  Temp:  [97.6 °F (36.4 °C)-97.8 °F (36.6 °C)] 97.6 °F (36.4 °C)  HR:  [75-76] 75  BP: ()/(57-82) 122/82  Resp:  [17] 17  SpO2:  [96 %-97 %] 97 %  O2 Device: None (Room air)    General:  No acute distress  Psychiatric:  Awake and alert  Pulmonary:  Normal respiratory excursion without accessory muscle use  Abdomen:  Soft, nontender  Extremities:  No edema  Skin:  No rashes      Lab Results: I have reviewed the following results:  Results from last 7 days   Lab Units 05/31/25  1447   WBC Thousand/uL 3.93*   HEMOGLOBIN g/dL 10.7*   PLATELETS Thousands/uL 216     Results from last 7 days   Lab Units 05/31/25  1447   SODIUM mmol/L 141   POTASSIUM mmol/L 4.4   CHLORIDE mmol/L 110*   CO2 mmol/L 24   BUN mg/dL 28*   CREATININE mg/dL 1.52*   EGFR ml/min/1.73sq m 44   CALCIUM mg/dL 9.6   AST U/L 20   ALT U/L 20   ALK PHOS U/L 77   ALBUMIN g/dL 4.0

## 2025-06-02 NOTE — ASSESSMENT & PLAN NOTE
Recent vitamin B12 level is 87 showing vitamin B12 deficiency.  Vitamin B-12 supplementation  Started on supplementation by PCP as outpatient.     B12 level noted to be elevated

## 2025-06-02 NOTE — ASSESSMENT & PLAN NOTE
With a positive treponemal test although negative RPR.  This is consistent with either previous treated syphilis versus waning RPR with persistent positive treponemal test.  Unable to obtain any detailed history as far as possible previous diagnosis and treatment.  With the patient's cognitive decline need to consider the possibility of neurosyphilis.  HIV screen negative.  -Hold off on empiric therapy for now to maximize lumbar puncture yield  -Check lumbar puncture with CSF analysis including cell counts, protein, glucose, CSF VRDL  -If any new abnormality seen in the CSF we will need to treat empirically for possible neurosyphilis.  -Additional CSF studies as per neurology  -If CSF analysis completely normal, will need to treat for late latent syphilis with benzathine penicillin 2,400,000 units weekly IM x 3 weeks

## 2025-06-02 NOTE — PHYSICAL THERAPY NOTE
Physical Therapy Evaluation     Patient's Name: Mo Berry    Admitting Diagnosis  Altered mental status [R41.82]  Positive RPR test [A53.0]  Unspecified multiple injuries, initial encounter [T07.XXXA]    Problem List  Problem List[1]    Past Medical History  Past Medical History[2]    Past Surgical History  Past Surgical History[3]       06/02/25 1212   PT Last Visit   PT Visit Date 06/02/25   Note Type   Note type Evaluation   Pain Assessment   Pain Assessment Tool FLACC   Pain Rating: FLACC (Rest) - Face 0   Pain Rating: FLACC (Rest) - Legs 0   Pain Rating: FLACC (Rest) - Activity 0   Pain Rating: FLACC (Rest) - Cry 0   Pain Rating: FLACC (Rest) - Consolability 0   Score: FLACC (Rest) 0   Pain Rating: FLACC (Activity) - Face 0   Pain Rating: FLACC (Activity) - Legs 0   Pain Rating: FLACC (Activity) - Activity 0   Pain Rating: FLACC (Activity) - Cry 0   Pain Rating: FLACC (Activity) - Consolability 0   Score: FLACC (Activity) 0   Restrictions/Precautions   Other Precautions Bed Alarm;Telemetry;Fall Risk;Cognitive;Droplet precautions   Home Living   Type of Home House   Home Layout Multi-level  (3 CHICHI)   Additional Comments Pt is a poor/limited historian; information obtained from EMR   Prior Function   Level of [place] Needs assistance with functional mobility  (recent decline in functional but was (I) prior to that)   Lives With Daughter   Receives Help From Family   General   Additional Pertinent History cleared for assessment by pk   Cognition   Overall Cognitive Status Impaired   Arousal/Participation Responsive   Attention Difficulty attending to directions   Orientation Level Oriented to person   Memory Decreased recall of biographical information;Decreased recall of recent events;Decreased recall of precautions   Following Commands Follows one step commands inconsistently   Subjective   Subjective Pt is observed in bed; pleasant and generally disoriented; mod difficulty w/ articulation and  "expressive speech; follows mainly tactile guiding but not as much verbal instructions   RUE Assessment   RUE Assessment WFL  (AROM)   LUE Assessment   LUE Assessment WFL  (AROM)   RLE Assessment   RLE Assessment WFL  (AROM)   Strength RLE   RLE Overall Strength   (at least fair (grossly))   LLE Assessment   LLE Assessment WFL  (AROM)   Strength LLE   LLE Overall Strength   (at least fair (grossly))   Bed Mobility   Supine to Sit 2  Maximal assistance   Additional items Assist x 2;HOB elevated;Increased time required;Verbal cues;LE management   Sit to Supine 2  Maximal assistance   Additional items Assist x 2;Increased time required;Verbal cues;LE management   Transfers   Sit to Stand Unable to assess  (pt adamantly declines trying to get up; states \"tomorrow\")   Stand to Sit Unable to assess   Balance   Static Sitting Poor   Dynamic Sitting Poor -   Activity Tolerance   Activity Tolerance Patient limited by fatigue;Other (Comment)  (cog status)   Medical Staff Made Aware Co-eval performed w/ OTR due to complexity of medical status and multiple comorbidities   Assessment   Prognosis Guarded   Problem List Decreased strength;Decreased endurance;Impaired balance;Decreased mobility;Decreased coordination;Decreased cognition;Impaired judgement;Decreased safety awareness   Assessment Bed level assessment initiated. Pt is 75 y.o. male admitted with hx rapid decline and Dx of syphilis, Encephalopathy acute; undergoing w/u. Pt 's comorbidities affecting POC include:  anemia, Low back pain, and CKD and personal factors of: steps in the house. Pt's clinical presentation is currently  unstable/unpredictable which is evident in ongoing telem monitoring, additional testing pending (MRI and LP), abn lab values and ongoing management of current Dx. Pt presents w/ overall weakness, decreased endurance, impaired cognition, impaired sitting balance, bed mob deficits and inability to progress to OOB mobility at this time. Will cont to " follow pt in PT for bed level activities at this time w/ progression to OOB mobility trials when clinically appropriate (PT to see the pt at that point). D/C recommendations are listed below; will follow   Barriers to Discharge Inaccessible home environment   Goals   Patient Goals to lay down   STG Expiration Date 06/12/25   Short Term Goal #1 7-10 days. Pt will achieve min (A)x1 level w/ bed mob in order to facilitate safety with OOB and back to bed transitions in the environment to decrease burden of care. Assess OOB transfers and amb as clinically appropriate; initiate LE therex to max strength and to facilitate progression w/ mobility skills.   PT Treatment Day 0   Plan   Treatment/Interventions Functional transfer training;LE strengthening/ROM;Therapeutic exercise;Endurance training;Cognitive reorientation;Bed mobility;Continued evaluation;OT   PT Frequency 2-3x/wk   Discharge Recommendation   Rehab Resource Intensity Level, PT II (Moderate Resource Intensity)   AM-PAC Basic Mobility Inpatient   Turning in Flat Bed Without Bedrails 2   Lying on Back to Sitting on Edge of Flat Bed Without Bedrails 1   Moving Bed to Chair 1   Standing Up From Chair Using Arms 1   Walk in Room 1   Climb 3-5 Stairs With Railing 1   Basic Mobility Inpatient Raw Score 7   [facility] Highest Level Of Mobility   -HL Goal 2: Bed activities/Dependent transfer   -HL Achieved 3: Sit at edge of bed   Modified [place] Scale   Modified [place] Scale 5   End of Consult   Patient Position at End of Consult Supine;Bed/Chair alarm activated;All needs within reach         Pradeep Jones, PT         [1]   Patient Active Problem List  Diagnosis    Anemia of chronic disease    Hypercholesterolemia    Memory deficit    Tremor    Primary osteoarthritis involving multiple joints    Vitamin B12 deficiency    Syphilis    Urinary incontinence    Chronic kidney disease, stage 3b (HCC)    Encephalopathy acute    Rapidly progressive dementia (HCC)   [2]    Past Medical History:  Diagnosis Date    Alcohol intoxication (HCC) 08/17/2018    Anemia     Fall     Low back pain     For 25 years   [3] No past surgical history on file.

## 2025-06-02 NOTE — ASSESSMENT & PLAN NOTE
In the setting of a patient with a progressive cognitive decline, with more acute deterioration in the last 8 weeks.  Differential diagnosis quite broad including infectious and noninfectious etiologies.  CT of the brain without a source.  HIV screen negative.  -Appreciate neurology recommendations  -Await lumbar puncture and additional CSF studies as per neurology  - Follow-up MRI of the brain  -Additional workup as needed

## 2025-06-02 NOTE — ASSESSMENT & PLAN NOTE
Anemia noted.  Hemoglobin remains stable.  Noted to have vitamin B 12 deficiency on previous blood work.  Today vitamin B12 level is elevated.  Hold further supplementation

## 2025-06-02 NOTE — OCCUPATIONAL THERAPY NOTE
Occupational Therapy Evaluation     Patient Name: Mo Berry  Today's Date: 6/2/2025  Problem List  Principal Problem:    Syphilis  Active Problems:    Vitamin B12 deficiency    Chronic kidney disease, stage 3b (HCC)    Encephalopathy acute    Rapidly progressive dementia (HCC)    Past Medical History  Past Medical History[1]  Past Surgical History  Past Surgical History[2]       06/02/25 1213   OT Last Visit   OT Visit Date 06/02/25   Note Type   Note type Evaluation   Pain Assessment   Pain Assessment Tool FLACC   Pain Rating: FLACC (Rest) - Face 0   Pain Rating: FLACC (Rest) - Legs 0   Pain Rating: FLACC (Rest) - Activity 0   Pain Rating: FLACC (Rest) - Cry 0   Pain Rating: FLACC (Rest) - Consolability 0   Score: FLACC (Rest) 0   Pain Rating: FLACC (Activity) - Face 0   Pain Rating: FLACC (Activity) - Legs 0   Pain Rating: FLACC (Activity) - Activity 0   Pain Rating: FLACC (Activity) - Cry 0   Pain Rating: FLACC (Activity) - Consolability 0   Score: FLACC (Activity) 0   Restrictions/Precautions   Weight Bearing Precautions Per Order No   Other Precautions Cognitive;Chair Alarm;Bed Alarm;Fall Risk;Droplet precautions  (r/o meningitis)   Home Living   Type of Home House   Home Layout Multi-level;Stairs to enter with rails  (3 SH w/ 3 CHICHI; Pt's bedroom is in the basement down FFoS and bathroom is on FF.)   Additional Comments Pt is a poor historian; information gathered via chart review. Will confirm w/ CM and/or family as able.   Prior Function   Level of [place] Needs assistance with ADLs;Needs assistance with functional mobility;Needs assistance with IADLS   Lives With Daughter  (+ daughter's family)   Receives Help From Family   IADLs Family/Friend/Other provides transportation   Vocational Retired   Comments Pt is a poor historian; information gathered via chart review. Will confirm w/ CM and/or family as able.   Lifestyle   Autonomy Per chart review, pt has been requiring increased physical  "assistance w/ ADLs and fxnl mobility for past few months. Pt is I w/ ADLs and fxnl mobility at his true baseline.   Reciprocal Relationships Pt lives w/ his daughter and her family.   Service to Others Pt is retired.   Intrinsic Gratification none stated; will continue to assess   General   Family/Caregiver Present No   ADL   Where Assessed Edge of bed   Eating Assistance 4  Minimal Assistance   Grooming Assistance 3  Moderate Assistance   UB Bathing Assistance 2  Maximal Assistance   LB Bathing Assistance 2  Maximal Assistance   UB Dressing Assistance 2  Maximal Assistance   LB Dressing Assistance 2  Maximal Assistance   Toileting Assistance  2  Maximal Assistance   Bed Mobility   Supine to Sit 2  Maximal assistance   Additional items Assist x 2;HOB elevated;Increased time required;Verbal cues;LE management   Sit to Supine 2  Maximal assistance   Additional items Assist x 2;Increased time required;Verbal cues;LE management   Additional Comments Pt required mod-max Ax1 to maintain sitting balance EOB; posterior lean. Pt supine in bed at end of OT evaluation w/ alarm activated and all needs within reach.   Transfers   Sit to Stand Unable to assess   Stand to Sit Unable to assess   Additional Comments Deferred OOB mobility at this time due to poor sitting balance EOB; pt stated that he will try a stand \"tomorrow.\" Will continue to assess as appropriate.   Activity Tolerance   Activity Tolerance Other (Comment)  (impaired cognition)   Medical Staff Made Aware PT, Pradeep, due to pt's medical complexity and multiple comorbidities   Nurse Made Aware RN clearance prior to session   RUE Assessment   RUE Assessment   (formal ROM/ MMT not tested due to impaired cognition; will continue to assess)   LUE Assessment   LUE Assessment   (formal ROM/ MMT not tested due to impaired cognition; will continue to assess)   Cognition   Overall Cognitive Status Impaired   Arousal/Participation Alert;Responsive   Attention Difficulty " attending to directions   Orientation Level Oriented to person  (Pt responded to his name; unable to recall birthday, place, or today's date.)   Following Commands Follows one step commands inconsistently   Comments Pt was identified by ID mariya. Pt was pleasantly confused t/o session; dysarthric and difficult to understand. Pt was distractable. Will continue to assess.   Assessment   Limitation Decreased ADL status;Decreased Safe judgement during ADL;Decreased cognition;Decreased endurance;Decreased self-care trans;Decreased high-level ADLs   Assessment Pt is a 75 y.o. male seen for OT evaluation s/p admission to [facility] on 5/31/2025 due to worsening cognition and generalized weakness. Pt diagnosed with Syphilis. Pt has a significant PMH impacting occupational performance including: Alcohol intoxication (HCC), Anemia, Fall, and Low back pain. Pt with active OT evaluation and treatment orders and activity orders. PTA, pt living with his daughter and his family in a 3 SH w/ 3 CHICHI. Per chart review, pt has been requiring A w/ ADLs, IADLs, and fxnl mobility for past few months; - driving. At his true baseline, pt is I w/ ADLs and fxnl mobility. Pt agreeable and willing to participate in OT evaluation. During evaluation, pt was min A for eating, mod A for grooming, and max A for LB ADLs, UB ADLs, and toileting. Pt also required max Ax2 for bed mobility and mod-max Ax1 to maintain sitting balance EOB. Performance deficits that affect the pt’s occupational performance during the initial evaluation include decreased ADL status, decreased activity tolerance, decreased endurance, decreased sitting tolerance, decreased sitting balance, decreased standing tolerance, decreased standing balance, decreased transfer skills, decreased fxnl mobility, decreased safety awareness, decreased insight into deficits, and impaired cognition . Based on pt’s functional performance and deficits the following occupations will be  addressed in OT treatments in order to maximize pt’s independence and overall occupational performance: grooming, bathing/showering, toileting and toilet hygiene, dressing, and functional mobility. Goals are listed below.  From OT perspective, recommend Level II (Moderate Resource Intensity) upon d/c when pt medically stable to d/c from acute care. Will continue to follow.   Goals   Patient Goals none stated at this time   LTG Time Frame 10-14   Plan   Treatment Interventions ADL retraining;Functional transfer training;Endurance training;Cognitive reorientation;Patient/family training;Equipment evaluation/education;Compensatory technique education;Continued evaluation;Energy conservation;Activityengagement   Goal Expiration Date 06/16/25   OT Treatment Day 0   OT Frequency 2-3x/wk   Discharge Recommendation   Rehab Resource Intensity Level, OT II (Moderate Resource Intensity)   AM-PAC Daily Activity Inpatient   Lower Body Dressing 2   Bathing 2   Toileting 2   Upper Body Dressing 2   Grooming 3   Eating 3   Daily Activity Raw Score 14   Daily Activity Standardized Score (Calc for Raw Score >=11) 33.39   AM-PAC Applied Cognition Inpatient   Following a Speech/Presentation 1   Understanding Ordinary Conversation 2   Taking Medications 1   Remembering Where Things Are Placed or Put Away 2   Remembering List of 4-5 Errands 1   Taking Care of Complicated Tasks 1   Applied Cognition Raw Score 8   Applied Cognition Standardized Score 19.32       The patient's raw score on the AM-PAC Daily Activity Inpatient Short Form is 14. A raw score of less than 19 suggests the patient may benefit from discharge to post-acute rehabilitation services. Please refer to the recommendation of the Occupational Therapist for safe discharge planning.    Goals: OTR to further assess pt's transfer skills and fxnl mobility as appropriate     - Pt will complete grooming tasks w/ S to maximize independence and return home.     - Pt will complete UB  ADLs w/ min A to maximize independence and return home.    - Pt will complete LB ADLs w/ mod A to maximize independence and return home.     - Pt will complete toileting routine (transfers, hygiene, and clothing management) w/ mod A to maximize independence and return to prior level of function.    - Pt will complete bed mobility supine >< sit w/ min Ax1 to maximize independence and return home.    - Pt will increase activity tolerance (and sitting tolerance) by eating all meals OOB in the chair.     - Pt will tolerate therapeutic activities for greater than 30 minutes in order to increase tolerance for functional activities.     - Pt will participate in ongoing OT evaluation of cognitive skills to assist with safe d/c planning/recommendations.      WILFREDO Carrasco, OTR/L                [1]   Past Medical History:  Diagnosis Date    Alcohol intoxication (HCC) 08/17/2018    Anemia     Fall     Low back pain     For 25 years   [2] No past surgical history on file.

## 2025-06-02 NOTE — PLAN OF CARE
Problem: OCCUPATIONAL THERAPY ADULT  Goal: Performs self-care activities at highest level of function for planned discharge setting.  See evaluation for individualized goals.  Description: Treatment Interventions: ADL retraining, Functional transfer training, Endurance training, Cognitive reorientation, Patient/family training, Equipment evaluation/education, Compensatory technique education, Continued evaluation, Energy conservation, Activityengagement          See flowsheet documentation for full assessment, interventions and recommendations.   6/2/2025 1513 by Nidia Cedeno OT  Note: Limitation: Decreased ADL status, Decreased Safe judgement during ADL, Decreased cognition, Decreased endurance, Decreased self-care trans, Decreased high-level ADLs     Assessment: Pt is a 75 y.o. male seen for OT evaluation s/p admission to [facility] on 5/31/2025 due to worsening cognition and generalized weakness. Pt diagnosed with Syphilis. Pt has a significant PMH impacting occupational performance including: Alcohol intoxication (HCC), Anemia, Fall, and Low back pain. Pt with active OT evaluation and treatment orders and activity orders. PTA, pt living with his daughter and his family in a 3 SH w/ 3 CHICHI. Per chart review, pt has been requiring A w/ ADLs, IADLs, and fxnl mobility for past few months; - driving. At his true baseline, pt is I w/ ADLs and fxnl mobility. Pt agreeable and willing to participate in OT evaluation. During evaluation, pt was min A for eating, mod A for grooming, and max A for LB ADLs, UB ADLs, and toileting. Pt also required max Ax2 for bed mobility and mod-max Ax1 to maintain sitting balance EOB. Performance deficits that affect the pt’s occupational performance during the initial evaluation include decreased ADL status, decreased activity tolerance, decreased endurance, decreased sitting tolerance, decreased sitting balance, decreased standing tolerance, decreased standing balance,  decreased transfer skills, decreased fxnl mobility, decreased safety awareness, decreased insight into deficits, and impaired cognition . Based on pt’s functional performance and deficits the following occupations will be addressed in OT treatments in order to maximize pt’s independence and overall occupational performance: grooming, bathing/showering, toileting and toilet hygiene, dressing, and functional mobility. Goals are listed below.  From OT perspective, recommend Level II (Moderate Resource Intensity) upon d/c when pt medically stable to d/c from acute care. Will continue to follow.     Rehab Resource Intensity Level, OT: II (Moderate Resource Intensity)       6/2/2025 1513 by Nidia Cedeno OT  Note: Limitation: Decreased ADL status, Decreased Safe judgement during ADL, Decreased cognition, Decreased endurance, Decreased self-care trans, Decreased high-level ADLs     Assessment: Pt is a 75 y.o. male seen for OT evaluation s/p admission to [facility] on 5/31/2025 due to worsening cognition and generalized weakness. Pt diagnosed with Syphilis. Pt has a significant PMH impacting occupational performance including: Alcohol intoxication (HCC), Anemia, Fall, and Low back pain. Pt with active OT evaluation and treatment orders and activity orders. PTA, pt living with his daughter and his family in a 3 SH w/ 3 CHICHI. Per chart review, pt has been requiring A w/ ADLs, IADLs, and fxnl mobility for past few months; - driving. At his true baseline, pt is I w/ ADLs and fxnl mobility. Pt agreeable and willing to participate in OT evaluation. During evaluation, pt was min A for eating, mod A for grooming, and max A for LB ADLs, UB ADLs, and toileting. Pt also required max Ax2 for bed mobility and mod-max Ax1 to maintain sitting balance EOB. Performance deficits that affect the pt’s occupational performance during the initial evaluation include decreased ADL status, decreased activity tolerance, decreased  endurance, decreased sitting tolerance, decreased sitting balance, decreased standing tolerance, decreased standing balance, decreased transfer skills, decreased fxnl mobility, decreased safety awareness, decreased insight into deficits, and impaired cognition . Based on pt’s functional performance and deficits the following occupations will be addressed in OT treatments in order to maximize pt’s independence and overall occupational performance: grooming, bathing/showering, toileting and toilet hygiene, dressing, and functional mobility. Goals are listed below.  From OT perspective, recommend Level II (Moderate Resource Intensity) upon d/c when pt medically stable to d/c from acute care. Will continue to follow.     Rehab Resource Intensity Level, OT: II (Moderate Resource Intensity)

## 2025-06-03 LAB
ALBUMIN SERPL ELPH-MCNC: 3.94 G/DL (ref 3.2–5.1)
ALBUMIN SERPL ELPH-MCNC: 55.5 % (ref 48–70)
ALPHA1 GLOB SERPL ELPH-MCNC: 0.4 G/DL (ref 0.15–0.47)
ALPHA1 GLOB SERPL ELPH-MCNC: 5.7 % (ref 1.8–7)
ALPHA2 GLOB SERPL ELPH-MCNC: 0.67 G/DL (ref 0.42–1.04)
ALPHA2 GLOB SERPL ELPH-MCNC: 9.4 % (ref 5.9–14.9)
BETA GLOB ABNORMAL SERPL ELPH-MCNC: 0.36 G/DL (ref 0.31–0.57)
BETA1 GLOB SERPL ELPH-MCNC: 5.1 % (ref 4.7–7.7)
BETA2 GLOB SERPL ELPH-MCNC: 5.4 % (ref 3.1–7.9)
BETA2+GAMMA GLOB SERPL ELPH-MCNC: 0.38 G/DL (ref 0.2–0.58)
GAMMA GLOB ABNORMAL SERPL ELPH-MCNC: 1.34 G/DL (ref 0.4–1.66)
GAMMA GLOB SERPL ELPH-MCNC: 18.9 % (ref 6.9–22.3)
IGG/ALB SER: 1.25 {RATIO} (ref 1.1–1.8)
PROT SERPL-MCNC: 7.1 G/DL (ref 6.4–8.4)

## 2025-06-03 PROCEDURE — G0545 PR INHERENT VISIT TO INPT: HCPCS | Performed by: INTERNAL MEDICINE

## 2025-06-03 PROCEDURE — 84165 PROTEIN E-PHORESIS SERUM: CPT | Performed by: PATHOLOGY

## 2025-06-03 PROCEDURE — 99232 SBSQ HOSP IP/OBS MODERATE 35: CPT | Performed by: FAMILY MEDICINE

## 2025-06-03 PROCEDURE — 95816 EEG AWAKE AND DROWSY: CPT | Performed by: PSYCHIATRY & NEUROLOGY

## 2025-06-03 PROCEDURE — 99232 SBSQ HOSP IP/OBS MODERATE 35: CPT | Performed by: INTERNAL MEDICINE

## 2025-06-03 RX ORDER — HEPARIN SODIUM 5000 [USP'U]/ML
5000 INJECTION, SOLUTION INTRAVENOUS; SUBCUTANEOUS EVERY 8 HOURS SCHEDULED
Status: DISCONTINUED | OUTPATIENT
Start: 2025-06-03 | End: 2025-06-07 | Stop reason: HOSPADM

## 2025-06-03 RX ADMIN — PENICILLIN G BENZATHINE 2.4 MILLION UNITS: 1200000 INJECTION, SUSPENSION INTRAMUSCULAR at 11:25

## 2025-06-03 NOTE — ASSESSMENT & PLAN NOTE
Patient has had rapid decline previously independent 6 weeks prior and oriented x 4 according to the daughter.  Deteriorated the past 6 weeks where now patient is fully dependent with all ADLs and is no longer oriented and just recently started having bowel and bladder incontinence  TSH is normal,  Hepatitis C, Lyme, heavy metals, were all negative  RPR is positive and treponema antibody screen was also positive  Followed with infectious disease on 5/30/2025-concern for neurosyphilis  There was an attempt to do a lumbar puncture in the emergency department however was not able to be done  As per previous ID progress note -unless patient's CSF is completely normal we will likely treat the patient with neurosyphilis with high-dose IV PCN at 4,000,000 units every 4 hours for 14 days  ID evaluation appreciated  Neurology evaluation appreciated  MRI pending  Monitor off of antibiotics for now  Status post lumbar puncture on 6/2.  CSF total count within normal limits.  Protein within normal limits.  CSF studies are not consistent with neurosyphilis.  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

## 2025-06-03 NOTE — CASE MANAGEMENT
Case Management Discharge Planning Note    Patient name Mo Berry  Location [facility] 917/[facility] 917-01 MRN [id number]  : 1950 Date 6/3/2025       Current Admission Date: 2025  Current Admission Diagnosis:Syphilis   Patient Active Problem List    Diagnosis Date Noted    Altered mental status 2025    Rapidly progressive dementia (HCC) 2025    Encephalopathy acute 2025    Urinary incontinence 2025    Chronic kidney disease, stage 3b (HCC) 2025    Vitamin B12 deficiency 2025    Syphilis 2025    Hypercholesterolemia 2025    Memory deficit 2025    Tremor 2025    Primary osteoarthritis involving multiple joints 2025    Anemia 10/05/2017      LOS (days): 3  Geometric Mean LOS (GMLOS) (days): 3.8  Days to GMLOS:0.9     OBJECTIVE:  Risk of Unplanned Readmission Score: 9.99         Current admission status: Inpatient   Preferred Pharmacy:   [id number] [place] - [address]  [address]  [place] [address]  Phone: [phone] Fax: [phone]    Primary Care Provider: Selvin Dumont DO    Primary Insurance: MEDICARE  Secondary Insurance: [facility]    DISCHARGE DETAILS:    Discharge planning discussed with:: patient's daughterLudivina via phone  Freedom of Choice: Yes  Comments - [place] of Choice: [facility] [place] referral sent in [place]  CM contacted family/caregiver?: Yes  Were Treatment Team discharge recommendations reviewed with patient/caregiver?: Yes  Did patient/caregiver verbalize understanding of patient care needs?: Yes  Were patient/caregiver advised of the risks associated with not following Treatment Team discharge recommendations?: Yes    Contacts  Patient Contacts: Ludivina Berry  Relationship to Patient:: Family  Contact Method: Phone  Phone Number: [phone]  Reason/Outcome: Discharge Planning        Other Referral/Resources/Interventions Provided:  Interventions: Short Term  Rehab  Treatment Team Recommendation: Short Term Rehab  Discharge Destination Plan:: Short Term Rehab

## 2025-06-03 NOTE — ASSESSMENT & PLAN NOTE
Recent vitamin B12 level is 87 showing vitamin B12 deficiency.  Vitamin B-12 supplementation  Started on supplementation by PCP as outpatient.     B12 level noted to be elevated on previous blood work.  OTC supplementation and folic acid

## 2025-06-03 NOTE — ASSESSMENT & PLAN NOTE
Rule out neurosyphilis.  Neurology evaluation appreciated.  MRI brain pending  Follow-up on the CSF [id number] on the final culture and pending labs.  Not concerning for neurosyphilis

## 2025-06-03 NOTE — ASSESSMENT & PLAN NOTE
In the setting of a patient with a progressive cognitive decline, with more acute deterioration in the last 8 weeks.  Differential diagnosis quite broad including infectious and noninfectious etiologies.  CT of the brain without a source.  HIV screen negative.  -Appreciate neurology recommendations  -Follow-up pending CSF studies  - Follow-up MRI of the brain  -Additional workup as needed

## 2025-06-03 NOTE — PLAN OF CARE
Problem: SAFETY ADULT  Goal: Patient will remain free of falls  Description: INTERVENTIONS:  - Educate patient/family on patient safety including physical limitations  - Instruct patient to call for assistance with activity   - Consider consulting OT/PT to assist with strengthening/mobility based on AM PAC & JH-HLM score  - Consult OT/PT to assist with strengthening/mobility   - Keep Call bell within reach  - Keep bed low and locked with side rails adjusted as appropriate  - Keep care items and personal belongings within reach  - Initiate and maintain comfort rounds  - Make Fall Risk Sign visible to staff  - Initiate/Maintain bed and chair alarm  - Obtain necessary fall risk management equipment: yellow socks, fall wrist band, alarms  - Apply yellow socks and bracelet for high fall risk patients  - Patient in room near nurses station  Outcome: Progressing  Goal: Maintain or return to baseline ADL function  Description: INTERVENTIONS:  -  Assess patient's ability to carry out ADLs; assess patient's baseline for ADL function and identify physical deficits which impact ability to perform ADLs (bathing, care of mouth/teeth, toileting, grooming, dressing, etc.)  - Assess/evaluate cause of self-care deficits   - Assess range of motion  - Assess patient's mobility; develop plan if impaired  - Assess patient's need for assistive devices and provide as appropriate  - Encourage maximum independence but intervene and supervise when necessary  - Involve family in performance of ADLs  - Assess for home care needs following discharge   - Consider OT consult to assist with ADL evaluation and planning for discharge  - Provide patient education as appropriate  - Monitor functional capacity and physical performance, use of AM PAC & JH-HLM   - Monitor gait, balance and fatigue with ambulation    Outcome: Progressing

## 2025-06-03 NOTE — ASSESSMENT & PLAN NOTE
Anemia noted.  Hemoglobin remains stable.  Noted to have vitamin B 12 deficiency on previous blood work.    Due to elevated vitamin B12 level on previous blood work hold further supplementation

## 2025-06-03 NOTE — PLAN OF CARE
Problem: SAFETY ADULT  Goal: Maintain or return to baseline ADL function  Description: INTERVENTIONS:  -  Assess patient's ability to carry out ADLs; assess patient's baseline for ADL function and identify physical deficits which impact ability to perform ADLs (bathing, care of mouth/teeth, toileting, grooming, dressing, etc.)  - Assess/evaluate cause of self-care deficits   - Assess range of motion  - Assess patient's mobility; develop plan if impaired  - Assess patient's need for assistive devices and provide as appropriate  - Encourage maximum independence but intervene and supervise when necessary  - Involve family in performance of ADLs  - Assess for home care needs following discharge   - Consider OT consult to assist with ADL evaluation and planning for discharge  - Provide patient education as appropriate  - Monitor functional capacity and physical performance, use of AM PAC & JH-HLM   - Monitor gait, balance and fatigue with ambulation    Outcome: Progressing

## 2025-06-03 NOTE — PROGRESS NOTES
Progress Note - Hospitalist   Name: Mo Berry 75 y.o. male I MRN: [id number]  Unit/Bed#: [facility] 917-01 I Date of Admission: 5/31/2025   Date of Service: 6/3/2025 I Hospital Day: 3    Assessment & Plan  Syphilis  Patient has had rapid decline previously independent 6 weeks prior and oriented x 4 according to the daughter.  Deteriorated the past 6 weeks where now patient is fully dependent with all ADLs and is no longer oriented and just recently started having bowel and bladder incontinence  TSH is normal,  Hepatitis C, Lyme, heavy metals, were all negative  RPR is positive and treponema antibody screen was also positive  Followed with infectious disease on 5/30/2025-concern for neurosyphilis  There was an attempt to do a lumbar puncture in the emergency department however was not able to be done  As per previous ID progress note -unless patient's CSF is completely normal we will likely treat the patient with neurosyphilis with high-dose IV PCN at 4,000,000 units every 4 hours for 14 days  ID evaluation appreciated  Neurology evaluation appreciated  MRI pending  Monitor off of antibiotics for now  Status post lumbar puncture on 6/2.  CSF total count within normal limits.  Protein within normal limits.  CSF studies are not consistent with neurosyphilis.  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  Chronic kidney disease, stage 3b (HCC)  Lab Results   Component Value Date    EGFR 50 06/02/2025    EGFR 44 05/31/2025    EGFR 30 04/23/2025    CREATININE 1.37 (H) 06/02/2025    CREATININE 1.52 (H) 05/31/2025    CREATININE 2.07 (H) 04/23/2025   Creatinine currently at baseline  Trend creatinine  Encephalopathy acute  Neurology evaluation appreciated.  MRI pending  Etiology for the encephalopathy is unclear  Rapidly progressive dementia (HCC)  Rule out neurosyphilis.  Neurology evaluation appreciated.  MRI brain pending  Follow-up on the CSF  [id number] on the final culture and pending labs.  Not concerning for neurosyphilis  Vitamin B12 deficiency  Recent vitamin B12 level is 87 showing vitamin B12 deficiency.  Vitamin B-12 supplementation  Started on supplementation by PCP as outpatient.     B12 level noted to be elevated on previous blood work.  OTC supplementation and folic acid  Anemia  Anemia noted.  Hemoglobin remains stable.  Noted to have vitamin B 12 deficiency on previous blood work.    Due to elevated vitamin B12 level on previous blood work hold further supplementation  Altered mental status      VTE Pharmacologic Prophylaxis:   Moderate Risk (Score 3-4) - Pharmacological DVT Prophylaxis Ordered: heparin.    Mobility:   Basic Mobility Inpatient Raw Score: 7  JH-HLM Goal: 2: Bed activities/Dependent transfer  JH-HLM Achieved: 2: Bed activities/Dependent transfer  JH-HLM Goal achieved. Continue to encourage appropriate mobility.    Patient Centered Rounds: I performed bedside rounds with nursing staff today.   Discussions with Specialists or Other Care Team Provider:     Education and Discussions with Family / Patient: Updated  (daughter) at bedside.    Current Length of Stay: 3 day(s)  Current Patient Status: Inpatient   Certification Statement: The patient will continue to require additional inpatient hospital stay due to pending MRI  Discharge Plan: Anticipate discharge in >72 hrs to rehab facility.    Code Status: Level 1 - Full Code    Subjective   Patient seen and examined.  Patient with waxing and waning of mentation.  Status post lumbar puncture yesterday.  Discussed with nursing.  MRI pending.  Benzathine penicillin G per ID.  Discussed with daughter who reported that patient has a heavy [place] accent but he does not speak any other language    Objective :  Temp:  [96.1 °F (35.6 °C)-97.2 °F (36.2 °C)] 97.2 °F (36.2 °C)  HR:  [68-83] 83  BP: ()/(59-87) 125/87  Resp:  [18] 18  SpO2:  [97 %-99 %] 97 %  O2  Device: None (Room air)    Body mass index is 22.14 kg/m².     Input and Output Summary (last 24 hours):     Intake/Output Summary (Last 24 hours) at 6/3/2025 1743  Last data filed at 6/3/2025 0700  Gross per 24 hour   Intake --   Output 375 ml   Net -375 ml       Physical Exam  Constitutional:       General: He is not in acute distress.     Appearance: He is not ill-appearing.   HENT:      Head: Normocephalic and atraumatic.      Nose: No congestion.     Eyes:      General: No scleral icterus.      Cardiovascular:      Rate and Rhythm: Normal rate and regular rhythm.      Heart sounds: No murmur heard.  Pulmonary:      Effort: No respiratory distress.   Abdominal:      General: There is no distension.     Musculoskeletal:         General: No swelling.     Skin:     General: Skin is warm.     Neurological:      Mental Status: He is alert.      Comments: Not oriented to time or place.  Moves all 4 extremities spontaneously and on command         Lines/Drains:              Lab Results: I have reviewed the following results:   Results from last 7 days   Lab Units 06/02/25  0956 05/31/25  1447   WBC Thousand/uL 4.64 3.93*   HEMOGLOBIN g/dL 10.6* 10.7*   HEMATOCRIT % 33.3* 33.7*   PLATELETS Thousands/uL 204  204 216   SEGS PCT %  --  58   LYMPHO PCT %  --  32   MONO PCT %  --  7   EOS PCT %  --  2     Results from last 7 days   Lab Units 06/02/25  0930 05/31/25  1447   SODIUM mmol/L 137 141   POTASSIUM mmol/L 4.3 4.4   CHLORIDE mmol/L 106 110*   CO2 mmol/L 21 24   BUN mg/dL 25 28*   CREATININE mg/dL 1.37* 1.52*   ANION GAP mmol/L 10 7   CALCIUM mg/dL 9.5 9.6   ALBUMIN g/dL  --  4.0   TOTAL BILIRUBIN mg/dL  --  0.32   ALK PHOS U/L  --  77   ALT U/L  --  20   AST U/L  --  20   GLUCOSE RANDOM mg/dL 166* 135     Results from last 7 days   Lab Units 06/02/25  0930   INR  1.01                   Recent Cultures (last 7 days):   Results from last 7 days   Lab Units 06/02/25  1417   GRAM STAIN RESULT  1+ Mononuclear Cells  No  Polys  No organisms seen             Last 24 Hours Medication List:     Current Facility-Administered Medications:     acetaminophen (TYLENOL) tablet 650 mg, Q6H PRN    [Held by provider] cyanocobalamin (VITAMIN B-12) tablet 1,000 mcg, Daily    [Held by provider] cyanocobalamin injection 1,000 mcg, Q30 Days    [Held by provider] folic acid (FOLVITE) tablet 1 mg, Daily    ondansetron (ZOFRAN) injection 4 mg, Q6H PRN    Penicillin G Benzathine (BICILLIN L-A) intramuscular injection 2.4 Million Units, Weekly    Administrative Statements   Today, Patient Was Seen By: Krista Vergara MD      **Please Note: This note may have been constructed using a voice recognition system.**

## 2025-06-03 NOTE — PLAN OF CARE
Problem: SAFETY ADULT  Goal: Maintain or return to baseline ADL function  Description: INTERVENTIONS:  -  Assess patient's ability to carry out ADLs; assess patient's baseline for ADL function and identify physical deficits which impact ability to perform ADLs (bathing, care of mouth/teeth, toileting, grooming, dressing, etc.)  - Assess/evaluate cause of self-care deficits   - Assess range of motion  - Assess patient's mobility; develop plan if impaired  - Assess patient's need for assistive devices and provide as appropriate  - Encourage maximum independence but intervene and supervise when necessary  - Involve family in performance of ADLs  - Assess for home care needs following discharge   - Consider OT consult to assist with ADL evaluation and planning for discharge  - Provide patient education as appropriate  - Monitor functional capacity and physical performance, use of AM PAC & JH-HLM   - Monitor gait, balance and fatigue with ambulation    Outcome: Progressing     Problem: DISCHARGE PLANNING  Goal: Discharge to home or other facility with appropriate resources  Description: INTERVENTIONS:  - Identify barriers to discharge w/patient and caregiver  - Arrange for needed discharge resources and transportation as appropriate  - Identify discharge learning needs (meds, wound care, etc.)  - Arrange for interpretive services to assist at discharge as needed  - Refer to Case Management Department for coordinating discharge planning if the patient needs post-hospital services based on physician/advanced practitioner order or complex needs related to functional status, cognitive ability, or social support system  Outcome: Progressing

## 2025-06-03 NOTE — PROGRESS NOTES
Progress Note - Infectious Disease   Name: Mo Berry 75 y.o. male I MRN: [id number]  Unit/Bed#: [facility] 917-01 I Date of Admission: 5/31/2025   Date of Service: 6/3/2025 I Hospital Day: 3     Assessment & Plan  Syphilis  With a positive treponemal test although negative RPR.  This is consistent with either previous treated syphilis versus waning RPR with long standing untreated infection.  Unable to obtain any detailed history as far as possible previous diagnosis and treatment.  With the patient's cognitive decline need to consider the possibility of neurosyphilis. LP results are not suggestive of neurosyphilis; normal WBC, normal protein thus will treat as late latent infection. HIV screen negative.  -With negative LP, treat as late latent infection with benzathine penicillin 2,400,000 units weekly IM x 3 weeks  -Will give first dose today  -Second dose due 6/10, third dose due 6/17  -May need to arrange for next two doses to be given at infusion center if patient discharged sooner  -Follow up CSF VDRL for completeness  - Follow-up additional pending CSF studies   Encephalopathy acute  In the setting of a patient with a progressive cognitive decline, with more acute deterioration in the last 8 weeks.  Differential diagnosis quite broad including infectious and noninfectious etiologies.  CT of the brain without a source.  HIV screen negative.  -Appreciate neurology recommendations  -Follow-up pending CSF studies  - Follow-up MRI of the brain  -Additional workup as needed  Chronic kidney disease, stage 3b (HCC)  Renal function seems to be within baseline.  -Recheck BMP to make sure no worsening  -Volume management  -Dose adjust any antibiotics as needed  Vitamin B12 deficiency  Management per primary team    I have discussed the above management plan in detail with the primary service.  They agree with plan to give first dose of IM penicillin today.    Antibiotics:  none    Subjective   Patient is pleasant this  morning, no acute distress.  He is able to answer some questions.  Denies any pain.  Denies fever.    Objective :  Temp:  [97 °F (36.1 °C)-98.3 °F (36.8 °C)] 97.4 °F (36.3 °C)  HR:  [73-92] 73  BP: ()/(59-84) 91/59  Resp:  [18] 18  SpO2:  [96 %-97 %] 97 %  O2 Device: None (Room air)    General:  No acute distress  Psychiatric:  Awake and alert  Pulmonary:  Normal respiratory excursion without accessory muscle use  Abdomen:  Soft, nontender  Extremities:  No edema  Skin:  No rashes        Lab Results: I have reviewed the following results:  Results from last 7 days   Lab Units 06/02/25  0956 05/31/25  1447   WBC Thousand/uL 4.64 3.93*   HEMOGLOBIN g/dL 10.6* 10.7*   PLATELETS Thousands/uL 204  204 216     Results from last 7 days   Lab Units 06/02/25  0930 05/31/25  1447   SODIUM mmol/L 137 141   POTASSIUM mmol/L 4.3 4.4   CHLORIDE mmol/L 106 110*   CO2 mmol/L 21 24   BUN mg/dL 25 28*   CREATININE mg/dL 1.37* 1.52*   EGFR ml/min/1.73sq m 50 44   CALCIUM mg/dL 9.5 9.6   AST U/L  --  20   ALT U/L  --  20   ALK PHOS U/L  --  77   ALBUMIN g/dL  --  4.0     Results from last 7 days   Lab Units 06/02/25  1417   GRAM STAIN RESULT  1+ Mononuclear Cells  No Polys  No organisms seen

## 2025-06-03 NOTE — ASSESSMENT & PLAN NOTE
With a positive treponemal test although negative RPR.  This is consistent with either previous treated syphilis versus waning RPR with long standing untreated infection.  Unable to obtain any detailed history as far as possible previous diagnosis and treatment.  With the patient's cognitive decline need to consider the possibility of neurosyphilis. LP results are not suggestive of neurosyphilis; normal WBC, normal protein thus will treat as late latent infection. HIV screen negative.  -With negative LP, treat as late latent infection with benzathine penicillin 2,400,000 units weekly IM x 3 weeks  -Will give first dose today  -Second dose due 6/10, third dose due 6/17  -May need to arrange for next two doses to be given at infusion center if patient discharged sooner  -Follow up CSF VDRL for completeness  - Follow-up additional pending CSF studies

## 2025-06-04 ENCOUNTER — APPOINTMENT (INPATIENT)
Dept: RADIOLOGY | Facility: HOSPITAL | Age: 75
DRG: 884 | End: 2025-06-04
Payer: MEDICARE

## 2025-06-04 ENCOUNTER — TELEPHONE (OUTPATIENT)
Age: 75
End: 2025-06-04

## 2025-06-04 LAB
AMPAR1 AB SER QL IF: NEGATIVE
AMPAR2 AB SER QL IF: NEGATIVE
AMPHIPHYSIN AB SER QL: NEGATIVE
ANION GAP SERPL CALCULATED.3IONS-SCNC: 0 MMOL/L (ref 4–13)
BUN SERPL-MCNC: 24 MG/DL (ref 5–25)
C GATTII+NEOFOR DNA CSF QL NAA+NON-PROBE: NOT DETECTED
CALCIUM SERPL-MCNC: 9.1 MG/DL (ref 8.4–10.2)
CASPR2 IGG SERPL QL IF: NEGATIVE
CHLORIDE SERPL-SCNC: 108 MMOL/L (ref 96–108)
CMV DNA CSF QL NAA+NON-PROBE: NOT DETECTED
CO2 SERPL-SCNC: 30 MMOL/L (ref 21–32)
CREAT SERPL-MCNC: 1.37 MG/DL (ref 0.6–1.3)
CV2 IGG SER-ACNC: NEGATIVE
DPPX IGG SERPL QL IF: NEGATIVE
E COLI K1 DNA CSF QL NAA+NON-PROBE: NOT DETECTED
ERYTHROCYTE [DISTWIDTH] IN BLOOD BY AUTOMATED COUNT: 13.4 % (ref 11.6–15.1)
EV RNA CSF QL NAA+NON-PROBE: NOT DETECTED
GABABR AB SER QL IF: NEGATIVE
GAD65 IGG+IGM SER IA-SCNC: NEGATIVE NMOL/L
GFR SERPL CREATININE-BSD FRML MDRD: 50 ML/MIN/1.73SQ M
GLIAL NUC TYPE 1 AB SER QL IF: NEGATIVE
GLUCOSE SERPL-MCNC: 88 MG/DL (ref 65–140)
GP B STREP DNA CSF QL NAA+NON-PROBE: NOT DETECTED
HAEM INFLU DNA CSF QL NAA+NON-PROBE: NOT DETECTED
HCT VFR BLD AUTO: 29.9 % (ref 36.5–49.3)
HGB BLD-MCNC: 9.7 G/DL (ref 12–17)
HHV6 DNA CSF QL NAA+NON-PROBE: NOT DETECTED
HSV1 DNA CSF QL NAA+NON-PROBE: NOT DETECTED
HSV2 DNA CSF QL NAA+NON-PROBE: NOT DETECTED
HU AB SER QL IF: NEGATIVE
HU2 AB SER QL IF: NEGATIVE
HU3 AB SER QL: NEGATIVE
IGLON5 IGG SER QL IF: NEGATIVE
INTERPRETATION: NEGATIVE
L MONOCYTOG DNA CSF QL NAA+NON-PROBE: NOT DETECTED
LGI1 IGG SERPL QL IF: NEGATIVE
MAGNESIUM SERPL-MCNC: 1.6 MG/DL (ref 1.9–2.7)
MCH RBC QN AUTO: 29.1 PG (ref 26.8–34.3)
MCHC RBC AUTO-ENTMCNC: 32.4 G/DL (ref 31.4–37.4)
MCV RBC AUTO: 90 FL (ref 82–98)
MGLUR1 IGG SER QL IF: NEGATIVE
N MEN DNA CSF QL NAA+NON-PROBE: NOT DETECTED
NMDAR1 AB SER QL IF: NEGATIVE
PARECHOVIRUS A RNA CSF QL NAA+NON-PROBE: NOT DETECTED
PCA-1 AB SER QL IF: NEGATIVE
PCA-2 AB SER QL IF: NEGATIVE
PCA-TR AB SER QL IF: NEGATIVE
PLATELET # BLD AUTO: 186 THOUSANDS/UL (ref 149–390)
PMV BLD AUTO: 11.2 FL (ref 8.9–12.7)
POTASSIUM SERPL-SCNC: 4.1 MMOL/L (ref 3.5–5.3)
RBC # BLD AUTO: 3.33 MILLION/UL (ref 3.88–5.62)
REAGIN AB CSF QL: NON REACTIVE
S PNEUM DNA CSF QL NAA+NON-PROBE: NOT DETECTED
SODIUM SERPL-SCNC: 138 MMOL/L (ref 135–147)
VZV DNA CSF QL NAA+NON-PROBE: NOT DETECTED
WBC # BLD AUTO: 4.11 THOUSAND/UL (ref 4.31–10.16)

## 2025-06-04 PROCEDURE — 83735 ASSAY OF MAGNESIUM: CPT | Performed by: FAMILY MEDICINE

## 2025-06-04 PROCEDURE — 85027 COMPLETE CBC AUTOMATED: CPT | Performed by: FAMILY MEDICINE

## 2025-06-04 PROCEDURE — 99233 SBSQ HOSP IP/OBS HIGH 50: CPT | Performed by: PSYCHIATRY & NEUROLOGY

## 2025-06-04 PROCEDURE — 70551 MRI BRAIN STEM W/O DYE: CPT

## 2025-06-04 PROCEDURE — 99232 SBSQ HOSP IP/OBS MODERATE 35: CPT | Performed by: HOSPITALIST

## 2025-06-04 PROCEDURE — 80048 BASIC METABOLIC PNL TOTAL CA: CPT | Performed by: FAMILY MEDICINE

## 2025-06-04 RX ORDER — LORAZEPAM 2 MG/ML
1 INJECTION INTRAMUSCULAR ONCE
Status: COMPLETED | OUTPATIENT
Start: 2025-06-04 | End: 2025-06-04

## 2025-06-04 RX ORDER — MAGNESIUM SULFATE HEPTAHYDRATE 40 MG/ML
2 INJECTION, SOLUTION INTRAVENOUS ONCE
Status: COMPLETED | OUTPATIENT
Start: 2025-06-04 | End: 2025-06-04

## 2025-06-04 RX ADMIN — MAGNESIUM SULFATE HEPTAHYDRATE 2 G: 40 INJECTION, SOLUTION INTRAVENOUS at 10:03

## 2025-06-04 RX ADMIN — LORAZEPAM 1 MG: 2 INJECTION INTRAMUSCULAR; INTRAVENOUS at 00:43

## 2025-06-04 RX ADMIN — HEPARIN SODIUM 5000 UNITS: 5000 INJECTION INTRAVENOUS; SUBCUTANEOUS at 15:18

## 2025-06-04 RX ADMIN — HEPARIN SODIUM 5000 UNITS: 5000 INJECTION INTRAVENOUS; SUBCUTANEOUS at 21:48

## 2025-06-04 RX ADMIN — HEPARIN SODIUM 5000 UNITS: 5000 INJECTION INTRAVENOUS; SUBCUTANEOUS at 06:24

## 2025-06-04 NOTE — ASSESSMENT & PLAN NOTE
75-year-old male with history of alcohol abuse, HLD, CKD stage 3, and rapidly progressive memory deficits x8 weeks and new progressive ambulatory dysfunction x3 weeks with positive treponema/negative RPR (concerning for possible neurosyphilis) who presented to [facility] on 5/31/2025 due to unwitnessed fall.  Patient was found covered in urine/feces.    Neurology was consulted due to rapidly progressive cognitive decline and ambulatory dysfunction.  Per patient's daughter, patient has had memory issues/confusion for the past 6 months, but over the past 8 weeks he has had a rapid decline in his cognition.  He initially was just confused about the year and season, but this has now progressed to needing assistance with almost all ADLs and his speech is frequently incomprehensible.  Approximately 3 weeks ago, patient's gait significantly declined and he is very unsteady.  Patient's daughter states that he is now shuffling and requires 1-2 people to help with transfers.  He never needed any assistive devices previously.  In the outpatient setting, his PCP checked syphilis screen which revealed positive Treponema, but RPR negative.  Based on ID notes, the serologic picture was consistent with prior syphilis, untreated or prior treatment.  Given no prior records of syphilis treatment, ID recommended treating for syphilis and considering the possibility of neurosyphilis given rapid decline in memory and ambulation     6/1: Patient confused with global aphasia and incomprehensible speech.  It is possible that patient has neurosyphilis, but cannot rule out other causes for rapidly progressive dementia    6/4: neurodiagnostics largely unrevealing to this point (still pending CSF/serum studies), no clear cut source to rapid cognitive/functional decline    Neurodiagnostic/workup thus far:  - MRI brain with Neuro-quant 6/4: motion degraded, but without intracranial pathology  - Routine EEG 6/2: mild diffuse cerebral dysfunction, no  epileptiform discharges/electrographic seizures  - CSF analysis 6/2:   - Normal protein, normal glucose, normal RBC count, WBC 1   - Pending studies: ME panel, autoimmune dementia profile, flow cytometry/cytology, CMV, VDRL, JANELL virus  -Serum labs:   - Serum autoimmune dementia profile negative  - Folate/B12 without deficiency, SPEP without monoclonal bands  - HIV nonreactive  - B6, B1 pending    Plan:  - Following pending CSF studies, appreciate ID input   -LP results not suggestive of neurosyphilis   -Treating with benzathine penicillin IM weekly x3 weeks  - Following pending serum labs  - Conservative management of delirium: minimize noise, maintain day/night cycle, provide frequent redirection, avoid restraints if possible, etc.  - PT/OT as able  - Frequent neuro checks. Continue to monitor and notify neurology with any changes.   - Medical management and supportive care per primary team. Correction of any metabolic or infectious disturbances  - Would benefit from formal OP neuropsych/cognitive testing once medically stable for discharge

## 2025-06-04 NOTE — ASSESSMENT & PLAN NOTE
- Neuro following, etiology remains unclear at this time  - MRI brain above and without acute intracranial pathology  - EEG 6/2 without epileptiform activity  - Autoimmune dementia profile negative  - Further workup as above, so far unremarkable  - Will need outpatient neuropsych/cognitive testing

## 2025-06-04 NOTE — TELEPHONE ENCOUNTER
STILL ADMITTED:5/31/2025 - present (4 days)  [facility]    PT WAS ALREADY SCHEDULED WITH , I HAVE CHANGED FROM NP TO AN HFU LONG.       HFU/ SL EZRA/ Rapidly progressive dementia     ---- Message from Shyam Sanders PA-C sent at 6/4/2025  2:25 PM EDT -----  Regarding: HFU  Mo Berry will need follow-up in in 6 weeks with general neurology team for Other= ATTENDING  in 60 minute appointment. They will not require outpatient neurological testing.

## 2025-06-04 NOTE — ASSESSMENT & PLAN NOTE
-Patient had rapid decline previously independent 6 weeks prior and oriented x 4 according to the daughter.  Deteriorated the past 6 weeks where now patient is fully dependent with all ADLs and is no longer oriented and just recently started having bowel and bladder incontinence  -TSH normal,  -HCV, Lyme, heavy metals, HIV were all negative  -RPR was positive and treponema antibody screen was also positive  -ID/neuro following  -There was concern for neurosyphilis. LP was done 6/2/25: CSF culture/prot/gluc, meningitis encephalitis panel, JANELL virus, cryptococcus antigen normal  -MRI brain: Limited exam without acute infarct. NeuroQuant analysis was performed: Images are severely motion limited. Quantitative analysis does not demonstrate evidence of mesial temporal focus volume loss, however the results may be unreliable. Recommend repeat exam when possible.   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 with benzathine penicillin 2,400,000 units weekly IM x 3 weeks 6/3, 6/10, 6/17  -CSF VDRL pending

## 2025-06-04 NOTE — PROGRESS NOTES
Progress Note - Hospitalist   Name: Mo Berry 75 y.o. male I MRN: [id number]  Unit/Bed#: [facility] 917-01 I Date of Admission: 5/31/2025   Date of Service: 6/4/2025 I Hospital Day: 4    Assessment & Plan  Syphilis  -Patient had rapid decline previously independent 6 weeks prior and oriented x 4 according to the daughter.  Deteriorated the past 6 weeks where now patient is fully dependent with all ADLs and is no longer oriented and just recently started having bowel and bladder incontinence  -TSH normal,  -HCV, Lyme, heavy metals, HIV were all negative  -RPR was positive and treponema antibody screen was also positive  -ID/neuro following  -There was concern for neurosyphilis. LP was done 6/2/25: CSF culture/prot/gluc, meningitis encephalitis panel, JANELL virus, cryptococcus antigen normal  -MRI brain: Limited exam without acute infarct. NeuroQuant analysis was performed: Images are severely motion limited. Quantitative analysis does not demonstrate evidence of mesial temporal focus volume loss, however the results may be unreliable. Recommend repeat exam when possible.   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 with benzathine penicillin 2,400,000 units weekly IM x 3 weeks 6/3, 6/10, 6/17  -CSF VDRL pending    Chronic kidney disease, stage 3b (HCC)  Lab Results   Component Value Date    EGFR 50 06/04/2025    EGFR 50 06/02/2025    EGFR 44 05/31/2025    CREATININE 1.37 (H) 06/04/2025    CREATININE 1.37 (H) 06/02/2025    CREATININE 1.52 (H) 05/31/2025    -Cr stable  Encephalopathy acute  - Neuro following, etiology remains unclear at this time  - MRI brain above and without acute intracranial pathology  - EEG 6/2 without epileptiform activity  - Autoimmune dementia profile negative  - Further workup as above, so far unremarkable  - Will need outpatient neuropsych/cognitive testing  Rapidly progressive dementia (HCC)  -see above  Vitamin B12 deficiency  Recent vitamin B12 level is 87 showing vitamin B12 deficiency and pt was  "started on supplementation by PCP as outpatient.  B12 level > 7500.    Anemia  -Hb stable  -normocytic    VTE Pharmacologic Prophylaxis:   heparin    Mobility:   Basic Mobility Inpatient Raw Score: 7  -HLM Goal: 2: Bed activities/Dependent transfer  JH-HLM Achieved: 3: Sit at edge of bed  JH-HLM Goal achieved. Continue to encourage appropriate mobility.    Patient Centered Rounds: I performed bedside rounds with nursing staff today.   Discussions with Specialists or Other Care Team Provider: ID and neuro    Current Length of Stay: 4 day(s)  Current Patient Status: Inpatient   Discharge Plan: Medically cleared for discharge.    Code Status: Level 1 - Full Code    Subjective   Patient confused but states he feels \"all right.\"    Objective :  Temp:  [96.7 °F (35.9 °C)-97.9 °F (36.6 °C)] 97.1 °F (36.2 °C)  HR:  [76-83] 76  BP: (104-125)/(67-87) 106/67  Resp:  [16-18] 16  SpO2:  [95 %-98 %] 95 %  O2 Device: None (Room air)    Body mass index is 22.14 kg/m².     Input and Output Summary (last 24 hours):     Intake/Output Summary (Last 24 hours) at 6/4/2025 1403  Last data filed at 6/4/2025 1249  Gross per 24 hour   Intake 480 ml   Output 1000 ml   Net -520 ml       Physical Exam  Gen: NAD, Awake and alert but not oriented, well developed, well nourished  Eyes: EOMI, PERRLA, no scleral icterus  ENMT:  no nasal discharge, no otic discharge, moist mucous membranes  Neck:  Supple  Cardiovascular:  Regular rate and rhythm, normal S1-S2, no murmurs, rubs, or gallops  Lungs:  Clear to auscultation bilaterally anteriorly, no wheezes, or rales, or rhonchi  Abdomen:  Positive bowel sounds, soft, nontender, nondistended  Skin:  Intact, no obvious lesions or rashes, no edema  Neuro: Cranial nerves 2-12 are intact, non-focal    Lines/Drains:              Lab Results: I have reviewed the following results:   Results from last 7 days   Lab Units 06/04/25  0604 06/02/25  0956 05/31/25  1447   WBC Thousand/uL 4.11*   < > 3.93* "   HEMOGLOBIN g/dL 9.7*   < > 10.7*   HEMATOCRIT % 29.9*   < > 33.7*   PLATELETS Thousands/uL 186   < > 216   SEGS PCT %  --   --  58   LYMPHO PCT %  --   --  32   MONO PCT %  --   --  7   EOS PCT %  --   --  2    < > = values in this interval not displayed.     Results from last 7 days   Lab Units 06/04/25  0604 06/02/25  0930 05/31/25  1447   SODIUM mmol/L 138   < > 141   POTASSIUM mmol/L 4.1   < > 4.4   CHLORIDE mmol/L 108   < > 110*   CO2 mmol/L 30   < > 24   BUN mg/dL 24   < > 28*   CREATININE mg/dL 1.37*   < > 1.52*   ANION GAP mmol/L 0*   < > 7   CALCIUM mg/dL 9.1   < > 9.6   ALBUMIN g/dL  --   --  4.0   TOTAL BILIRUBIN mg/dL  --   --  0.32   ALK PHOS U/L  --   --  77   ALT U/L  --   --  20   AST U/L  --   --  20   GLUCOSE RANDOM mg/dL 88   < > 135    < > = values in this interval not displayed.     Results from last 7 days   Lab Units 06/02/25  0930   INR  1.01                   Recent Cultures (last 7 days):   Results from last 7 days   Lab Units 06/02/25  1417   GRAM STAIN RESULT  1+ Mononuclear Cells  No Polys  No organisms seen       Imaging Results Review: I reviewed radiology reports from this admission including: MRI brain.      Last 24 Hours Medication List:     Current Facility-Administered Medications:     acetaminophen (TYLENOL) tablet 650 mg, Q6H PRN    heparin (porcine) subcutaneous injection 5,000 Units, Q8H PHILLY    ondansetron (ZOFRAN) injection 4 mg, Q6H PRN    Penicillin G Benzathine (BICILLIN L-A) intramuscular injection 2.4 Million Units, Weekly    Administrative Statements   Today, Patient Was Seen By: Enmanuel Dixon MD      **Please Note: This note may have been constructed using a voice recognition system.**

## 2025-06-04 NOTE — ASSESSMENT & PLAN NOTE
Lab Results   Component Value Date    EGFR 50 06/04/2025    EGFR 50 06/02/2025    EGFR 44 05/31/2025    CREATININE 1.37 (H) 06/04/2025    CREATININE 1.37 (H) 06/02/2025    CREATININE 1.52 (H) 05/31/2025    -Cr stable

## 2025-06-04 NOTE — PROGRESS NOTES
Progress Note - Neurology   Name: Mo Berry 75 y.o. male I MRN: [id number]  Unit/Bed#: [facility] 917-01 I Date of Admission: 5/31/2025   Date of Service: 6/4/2025 I Hospital Day: 4     Assessment & Plan  Rapidly progressive dementia (HCC)  75-year-old male with history of alcohol abuse, HLD, CKD stage 3, and rapidly progressive memory deficits x8 weeks and new progressive ambulatory dysfunction x3 weeks with positive treponema/negative RPR (concerning for possible neurosyphilis) who presented to [facility] on 5/31/2025 due to unwitnessed fall.  Patient was found covered in urine/feces.    Neurology was consulted due to rapidly progressive cognitive decline and ambulatory dysfunction.  Per patient's daughter, patient has had memory issues/confusion for the past 6 months, but over the past 8 weeks he has had a rapid decline in his cognition.  He initially was just confused about the year and season, but this has now progressed to needing assistance with almost all ADLs and his speech is frequently incomprehensible.  Approximately 3 weeks ago, patient's gait significantly declined and he is very unsteady.  Patient's daughter states that he is now shuffling and requires 1-2 people to help with transfers.  He never needed any assistive devices previously.  In the outpatient setting, his PCP checked syphilis screen which revealed positive Treponema, but RPR negative.  Based on ID notes, the serologic picture was consistent with prior syphilis, untreated or prior treatment.  Given no prior records of syphilis treatment, ID recommended treating for syphilis and considering the possibility of neurosyphilis given rapid decline in memory and ambulation     6/1: Patient confused with global aphasia and incomprehensible speech.  It is possible that patient has neurosyphilis, but cannot rule out other causes for rapidly progressive dementia    6/4: neurodiagnostics largely unrevealing to this point (still pending CSF/serum studies), no  clear cut source to rapid cognitive/functional decline    Neurodiagnostic/workup thus far:  - MRI brain with Neuro-quant 6/4: motion degraded, but without intracranial pathology  - Routine EEG 6/2: mild diffuse cerebral dysfunction, no epileptiform discharges/electrographic seizures  - CSF analysis 6/2:   - Normal protein, normal glucose, normal RBC count, WBC 1   - Pending studies: ME panel, autoimmune dementia profile, flow cytometry/cytology, CMV, VDRL, JANELL virus  -Serum labs:   - Serum autoimmune dementia profile negative  - Folate/B12 without deficiency, SPEP without monoclonal bands  - HIV nonreactive  - B6, B1 pending    Plan:  - Following pending CSF studies, appreciate ID input   -LP results not suggestive of neurosyphilis   -Treating with benzathine penicillin IM weekly x3 weeks  - Following pending serum labs  - Conservative management of delirium: minimize noise, maintain day/night cycle, provide frequent redirection, avoid restraints if possible, etc.  - PT/OT as able  - Frequent neuro checks. Continue to monitor and notify neurology with any changes.   - Medical management and supportive care per primary team. Correction of any metabolic or infectious disturbances  - Would benefit from formal OP neuropsych/cognitive testing once medically stable for discharge  Syphilis  - Positive treponemal test although negative RPR consistent with either previously treated syphilis versus waning RPR with persistent positive treponemal test  - ID following; appreciate recommendations   -LP results not suggestive of neurosyphilis   -Treating with benzathine penicillin  Vitamin B12 deficiency  -Level of >7500 on 6/2  Altered mental status  -See workup above    Will further discuss plan of care with attending neurologist.     Mo Berry will need follow-up in in 6 weeks with general neurology team for Other in 60 minute appointment. They will not require outpatient neurological testing.     Subjective   Patient new to  examiner, reviewed neurology consult note from 6/1 in detail. Discussed with nursing this morning, confused overnight more-so than today, still overall hard to understand when conversing. No behavioral issues (impulsivity, agitation, etc)  Patient himself is doing ok this morning, denies any headache/pain nor other systemic symptoms.     Review of Systems    Objective :  Temp:  [96.1 °F (35.6 °C)-97.9 °F (36.6 °C)] 97.1 °F (36.2 °C)  HR:  [68-83] 76  BP: (104-131)/(67-87) 106/67  Resp:  [16-18] 16  SpO2:  [95 %-99 %] 95 %  O2 Device: None (Room air)    Physical Exam  Constitutional:       Appearance: Normal appearance.   HENT:      Head: Normocephalic and atraumatic.     Eyes:      Extraocular Movements: Extraocular movements intact.      Conjunctiva/sclera: Conjunctivae normal.      Pupils: Pupils are equal, round, and reactive to light.       Cardiovascular:      Rate and Rhythm: Normal rate.   Pulmonary:      Effort: Pulmonary effort is normal.   Abdominal:      General: There is no distension.     Musculoskeletal:      Cervical back: Normal range of motion and neck supple.     Skin:     General: Skin is warm and dry.     Neurological:      Mental Status: He is alert.     Neurological Exam  Mental Status  Alert.  Patient awake/alert, oriented to self, birth month and year, names simple objects (including banana, coffee on his breakfast tray). Repeating simple words. Is moderately-severely dysarthric, but can overall be understood today (appears more-so than during initial neurology consult). Following simple central and appendicular commands. .    Cranial Nerves  CN III, IV, VI: Extraocular movements intact bilaterally. Pupils equal round and reactive to light bilaterally.  Full EOM's, conjugate gaze, no apparent visual field cut to finger counting (although limited exam as patient fixates gaze on examiner's fingers themselves and not peripherally), symmetric smile and midline tongue protrusion.    Motor    No  drift in the extremities x4 with antigravity effort, symmetric , symmetric hip flexion and dorsiflexion/plantarflexion throughout extremities; no obvious focal/lateralizing motor deficit. .    Sensory  Light touch is normal in upper and lower extremities. Pinprick is normal in upper and lower extremities.     Reflexes  2+ bilateral patellars, crossed adductors on L>R, 2+ brachioradialis/biceps.    Coordination    Finger to nose performed without obvious ataxia nor bradykinesia. .    Gait    Deferred for patient's safety.        Lab Results: I have reviewed the following results:  Imaging Results Review: I personally reviewed the following image studies in PACS and associated radiology reports:   MRI brain NeuroQuant wo contrast   Final Result by Everette Mcclelland MD (06/04 0749)   Limited exam without acute infarct.      NeuroQuant analysis was performed: Images are severely motion limited. Quantitative analysis does not demonstrate evidence of mesial temporal focus volume loss, however the results may be unreliable. Recommend repeat exam when possible.            Workstation performed: [id number]         FL IN-patient lumbar puncture   Final Result by Babak Reilly MD (06/02 1919)      Successful fluoroscopically guided lumbar puncture yielding approximately 14 mL of clear colorless CSF.      I reviewed the above findings and procedure with Dr. Reilly.      PERFORMED, DICTATED AND SIGNED BY: Janie Almanza PA-C         Workstation performed: [id number]         CT head without contrast   Final Result by Pallav N Shah, MD (05/31 1371)      No acute intracranial abnormality. Chronic microangiopathic changes.                  Workstation performed: [id number]         CT spine cervical without contrast   Final Result by Pallav N Shah, MD (05/31 6643)      No cervical spine fracture or traumatic malalignment.                  Workstation performed: [id number]         CT chest abdomen pelvis wo contrast    Final Result by Aleksandra Schaefer MD (05/31 4250)      No posttraumatic injury visualized.   Mild increased attenuation of the mesentery with small lymph nodes suggesting mesenteric panniculitis.   Mild emphysema and coronary calcifications.         Computerized Assisted Algorithm (CAA) may have aided analysis of applicable images.         Workstation performed: [id number]             Other Study Results Review: No additional pertinent studies reviewed.    VTE Pharmacologic Prophylaxis: Sequential compression device (Venodyne)  and Heparin    Please see attending's attestation for total time spent/billing. Discussed plan of care with patient and primary team: reviewed MRI brain, CSF results and EEG. Will recommend ongoing/OP neurocognitive workup once ready for discharge.      Please note dictation software was used in the formulation of this note. Please keep that in mind in light of any grammatical errors.

## 2025-06-04 NOTE — ASSESSMENT & PLAN NOTE
- Positive treponemal test although negative RPR consistent with either previously treated syphilis versus waning RPR with persistent positive treponemal test  - ID following; appreciate recommendations   -LP results not suggestive of neurosyphilis   -Treating with benzathine penicillin

## 2025-06-04 NOTE — ASSESSMENT & PLAN NOTE
Recent vitamin B12 level is 87 showing vitamin B12 deficiency and pt was started on supplementation by PCP as outpatient.  B12 level > 7500.

## 2025-06-04 NOTE — PLAN OF CARE
Problem: SAFETY ADULT  Goal: Patient will remain free of falls  Description: INTERVENTIONS:  - Educate patient/family on patient safety including physical limitations  - Instruct patient to call for assistance with activity   - Consider consulting OT/PT to assist with strengthening/mobility based on AM PAC & JH-HLM score  - Consult OT/PT to assist with strengthening/mobility   - Keep Call bell within reach  - Keep bed low and locked with side rails adjusted as appropriate  - Keep care items and personal belongings within reach  - Initiate and maintain comfort rounds  - Make Fall Risk Sign visible to staff  - Initiate/Maintain bed alarm  - Obtain necessary fall risk management equipment: Yellow socks, fall sign, yellow bracelet  - Apply yellow socks and bracelet for high fall risk patients  - Consider moving patient to room near nurses station  Outcome: Progressing

## 2025-06-05 LAB
AMPAR1 IGG CSF QL IF: NEGATIVE
AMPAR2 IGG CSF QL IF: NEGATIVE
AMPHIPHYSIN AB CSF QL IF: NEGATIVE
ANION GAP SERPL CALCULATED.3IONS-SCNC: 4 MMOL/L (ref 4–13)
BACTERIA CSF CULT: NO GROWTH
BUN SERPL-MCNC: 25 MG/DL (ref 5–25)
CALCIUM SERPL-MCNC: 9.3 MG/DL (ref 8.4–10.2)
CASPR2 AB CSF QL CBA IFA: NEGATIVE
CHLORIDE SERPL-SCNC: 104 MMOL/L (ref 96–108)
CO2 SERPL-SCNC: 30 MMOL/L (ref 21–32)
CREAT SERPL-MCNC: 1.46 MG/DL (ref 0.6–1.3)
CV2 AB CSF QL IF: NEGATIVE
DPPX IGG CSF QL IF: NEGATIVE
ERYTHROCYTE [DISTWIDTH] IN BLOOD BY AUTOMATED COUNT: 13.5 % (ref 11.6–15.1)
GABABR AB CSF QL CBA IFA: NEGATIVE
GAD65 AB CSF IA-SCNC: NEGATIVE NMOL/L
GFR SERPL CREATININE-BSD FRML MDRD: 46 ML/MIN/1.73SQ M
GLIAL NUC TYPE 1 AB CSF QL IF: NEGATIVE
GLUCOSE SERPL-MCNC: 145 MG/DL (ref 65–140)
GLUCOSE SERPL-MCNC: 74 MG/DL (ref 65–140)
HCT VFR BLD AUTO: 30.3 % (ref 36.5–49.3)
HGB BLD-MCNC: 9.7 G/DL (ref 12–17)
HU1 AB CSF QL IF: NEGATIVE
HU2 AB CSF QL IF: NEGATIVE
HU3 AB CSF QL IF: NEGATIVE
IGLON5 IGG CSF QL CBA IFA: NEGATIVE
IMP & REVIEW OF LAB RESULTS: NEGATIVE
LGI1 AB CSF QL CBA IFA: NEGATIVE
MCH RBC QN AUTO: 28.9 PG (ref 26.8–34.3)
MCHC RBC AUTO-ENTMCNC: 32 G/DL (ref 31.4–37.4)
MCV RBC AUTO: 90 FL (ref 82–98)
MGLUR1 IGG CSF QL CBA IFA: NEGATIVE
NMDAR IGG CSF QL IF: NEGATIVE
PCA-2 AB CSF QL IF: NEGATIVE
PCA-TR AB CSF QL IF: NEGATIVE
PLATELET # BLD AUTO: 196 THOUSANDS/UL (ref 149–390)
PMV BLD AUTO: 11.8 FL (ref 8.9–12.7)
POTASSIUM SERPL-SCNC: 4.5 MMOL/L (ref 3.5–5.3)
PURKINJE CELLS AB CSF QL IF: NEGATIVE
RBC # BLD AUTO: 3.36 MILLION/UL (ref 3.88–5.62)
SODIUM SERPL-SCNC: 138 MMOL/L (ref 135–147)
WBC # BLD AUTO: 4.68 THOUSAND/UL (ref 4.31–10.16)

## 2025-06-05 PROCEDURE — 97530 THERAPEUTIC ACTIVITIES: CPT

## 2025-06-05 PROCEDURE — 97116 GAIT TRAINING THERAPY: CPT

## 2025-06-05 PROCEDURE — 99232 SBSQ HOSP IP/OBS MODERATE 35: CPT | Performed by: HOSPITALIST

## 2025-06-05 PROCEDURE — G0545 PR INHERENT VISIT TO INPT: HCPCS | Performed by: INTERNAL MEDICINE

## 2025-06-05 PROCEDURE — 99232 SBSQ HOSP IP/OBS MODERATE 35: CPT | Performed by: INTERNAL MEDICINE

## 2025-06-05 PROCEDURE — 85027 COMPLETE CBC AUTOMATED: CPT | Performed by: HOSPITALIST

## 2025-06-05 PROCEDURE — 82948 REAGENT STRIP/BLOOD GLUCOSE: CPT

## 2025-06-05 PROCEDURE — 97535 SELF CARE MNGMENT TRAINING: CPT

## 2025-06-05 PROCEDURE — 80048 BASIC METABOLIC PNL TOTAL CA: CPT | Performed by: HOSPITALIST

## 2025-06-05 RX ADMIN — HEPARIN SODIUM 5000 UNITS: 5000 INJECTION INTRAVENOUS; SUBCUTANEOUS at 05:21

## 2025-06-05 RX ADMIN — HEPARIN SODIUM 5000 UNITS: 5000 INJECTION INTRAVENOUS; SUBCUTANEOUS at 14:30

## 2025-06-05 RX ADMIN — HEPARIN SODIUM 5000 UNITS: 5000 INJECTION INTRAVENOUS; SUBCUTANEOUS at 21:32

## 2025-06-05 NOTE — CASE MANAGEMENT
Case Management Discharge Planning Note    Patient name Mo Huston [facility] 917/[facility] 917-01 MRN [id number]  : 1950 Date 2025       Current Admission Date: 2025  Current Admission Diagnosis:Syphilis   Patient Active Problem List    Diagnosis Date Noted    Rapidly progressive dementia (HCC) 2025    Encephalopathy acute 2025    Urinary incontinence 2025    Chronic kidney disease, stage 3b (HCC) 2025    Vitamin B12 deficiency 2025    Syphilis 2025    Hypercholesterolemia 2025    Memory deficit 2025    Tremor 2025    Primary osteoarthritis involving multiple joints 2025    Anemia 10/05/2017      LOS (days): 5  Geometric Mean LOS (GMLOS) (days): 4.7  Days to GMLOS:-0.1     OBJECTIVE:  Risk of Unplanned Readmission Score: 10.82         Current admission status: Inpatient   Preferred Pharmacy:   [id number] - [place] - [address]  [address]  [place] [address]  Phone: [phone] Fax: [phone]    Primary Care Provider: Selvin Dumont DO    Primary Insurance: MEDICARE  Secondary Insurance: [facility]    DISCHARGE DETAILS: CM sent message to [facility] to confirm SOC.  Pending update.  Pt is cleared by surgery, pending SOC.  [facility] is reserved.

## 2025-06-05 NOTE — OCCUPATIONAL THERAPY NOTE
Occupational Therapy Progress Note     Patient Name: Mo Berry  Today's Date: 6/5/2025  Problem List  Principal Problem:    Syphilis  Active Problems:    Anemia    Vitamin B12 deficiency    Chronic kidney disease, stage 3b (HCC)    Encephalopathy acute    Rapidly progressive dementia (HCC)       06/05/25 1006   OT Last Visit   OT Visit Date 06/05/25   Note Type   Note Type Treatment   Pain Assessment   Pain Assessment Tool 0-10   Pain Score No Pain   Restrictions/Precautions   Weight Bearing Precautions Per Order No   Other Precautions Cognitive;Chair Alarm;Bed Alarm;Multiple lines;Fall Risk;Droplet precautions   Lifestyle   Autonomy Per chart review, pt has been requiring increased physical assistance w/ ADLs and fxnl mobility for past few months. Pt is I w/ ADLs and fxnl mobility at his true baseline.   Reciprocal Relationships Pt lives w/ his daughter and her family.   Service to Others Pt is retired.   Intrinsic Gratification none stated; will continue to assess   ADL   Where Assessed Edge of bed  (+ chair)   UB Dressing Assistance 3  Moderate Assistance   UB Dressing Deficit Setup;Verbal cueing;Increased time to complete;Thread RUE;Thread LUE;Pull over head   UB Dressing Comments Pt required A to don/doff [facility] gown seated in chair.   LB Dressing Assistance 2  Maximal Assistance   LB Dressing Deficit Setup;Don/doff R sock;Don/doff L sock   Bed Mobility   Supine to Sit 2  Maximal assistance   Additional items Assist x 2;HOB elevated;Increased time required;Verbal cues;LE management   Sit to Supine Unable to assess   Additional Comments Pt seated OOB in chair at end of OT tx session w/ alarm activated and all needs within reach.   Transfers   Sit to Stand 2  Maximal assistance   Additional items Assist x 1;Armrests;Increased time required;Verbal cues   Stand to Sit 2  Maximal assistance   Additional items Assist x 1;Armrests;Increased time required;Verbal cues   Additional Comments Pt required max Ax2 w/  b/l HHA for initial stand from bed but progressed to max Ax1 w/ RW for following stand from chair.   Functional Mobility   Functional Mobility 2  Maximal assistance   Additional Comments Pt took few steps forward w/ max Ax1 using RW for support; chair follow for safety and both verbal and tactile cues for walker management.   Additional items Rolling walker   Cognition   Overall Cognitive Status Impaired   Arousal/Participation Alert;Responsive;Cooperative   Attention Attends with cues to redirect   Orientation Level Oriented to person;Disoriented to place;Disoriented to time   Following Commands Follows one step commands with increased time or repetition   Comments Pt was pleasantly confused, cooperative, and willing to participate in OT tx session. Pt w/ less distractable on this date; improved command following.   Activity Tolerance   Activity Tolerance Patient tolerated treatment well   Medical Staff Made Aware RN clearance prior to session; Sally MOREJON, due to pt's medical complexity and multiple comorbidities   Assessment   Assessment Pt seen for skilled OT treatment session from 0942 to 1006 w/ interventions focusing on ADL participation, activity tolerance, sitting tolerance, sitting balance, standing tolerance, standing balance, bed mobility , transfer skills, fxnl mobility, and cognitive re-orientation . Pt was agreeable and willing to participate in session. Pt engaged in the following tasks: mod A for UB dressing and max A for LB dressing. Pt also required max Ax2 for bed mobility and max Ax1 for transfers and fxnl mobility w/ RW. In comparison to previous session, pt demonstrated improvements in ADLs and mobility tasks as he required less physical assistance for UB dressing today and he was able to participate in transfers and fxnl mobility on this date. Pt continues to be functioning below baseline level as occupational performance remains limited by decreased ADL status, decreased activity tolerance,  decreased endurance, decreased sitting tolerance, decreased sitting balance, decreased standing tolerance, decreased standing balance, decreased transfer skills, decreased fxnl mobility, decreased safety awareness, decreased insight into deficits, and impaired cognition . From OT standpoint, recommend Level II (Moderate Resource Intensity) at time of d/c. Pt will benefit from continued OT treatment while in acute care to address deficits as defined above and maximize level of functional independence with ADLs and functional mobility. Pt seated OOB in chair w/ alarm activated and all needs met at end of session.   Plan   Treatment Interventions ADL retraining;Functional transfer training;Endurance training;Patient/family training;Equipment evaluation/education;Compensatory technique education;Continued evaluation;Energy conservation;Activityengagement   Goal Expiration Date 06/16/25   OT Treatment Day 1   OT Frequency 2-3x/wk   Discharge Recommendation   Rehab Resource Intensity Level, OT II (Moderate Resource Intensity)   AM-PAC Daily Activity Inpatient   Lower Body Dressing 2   Bathing 2   Toileting 2   Upper Body Dressing 2   Grooming 3   Eating 3   Daily Activity Raw Score 14   Daily Activity Standardized Score (Calc for Raw Score >=11) 33.39   -PAC Applied Cognition Inpatient   Following a Speech/Presentation 1   Understanding Ordinary Conversation 3   Taking Medications 1   Remembering Where Things Are Placed or Put Away 2   Remembering List of 4-5 Errands 1   Taking Care of Complicated Tasks 1   Applied Cognition Raw Score 9   Applied Cognition Standardized Score 22.48         The patient's raw score on the -PAC Daily Activity Inpatient Short Form is 14. A raw score of less than 19 suggests the patient may benefit from discharge to post-acute rehabilitation services. Please refer to the recommendation of the Occupational Therapist for safe discharge planning.    Transfer and Fxnl Mobility Goals:      - Pt  will transfer to bed, chair, and toilet w/ min Ax1 using AD / DME as needed to maximize independence and reduce burden of care.     - Pt will ambulate household distances w/ min Ax1 using least restrictive device to maximize independence and return home.       WILFREDO Carrasco, OTR/L

## 2025-06-05 NOTE — PLAN OF CARE
Problem: Prexisting or High Potential for Compromised Skin Integrity  Goal: Skin integrity is maintained or improved  Description: INTERVENTIONS:  - Identify patients at risk for skin breakdown  - Assess and monitor skin integrity including under and around medical devices   - Assess and monitor nutrition and hydration status  - Monitor labs  - Assess for incontinence   - Turn and reposition patient  - Assist with mobility/ambulation  - Relieve pressure over shira prominences   - Avoid friction and shearing  - Provide appropriate hygiene as needed including keeping skin clean and dry  - Evaluate need for skin moisturizer/barrier cream  - Collaborate with interdisciplinary team  - Patient/family teaching  - Consider wound care consult    Assess:  - Review Denzel scale daily  - Clean and moisturize skin every   - Inspect skin when repositioning, toileting, and assisting with ADLS  - Assess under medical devices such as  every   - Assess extremities for adequate circulation and sensation     Bed Management:  - Have minimal linens on bed & keep smooth, unwrinkled  - Change linens as needed when moist or perspiring  - Avoid sitting or lying in one position for more than  hours while in bed?Keep HOB at degrees   - Toileting:  - Offer bedside commode  - Assess for incontinence every   - Use incontinent care products after each incontinent episode such as     Activity:  - Mobilize patient  times a day  - Encourage activity and walks on unit  - Encourage or provide ROM exercises   - Turn and reposition patient every  Hours  - Use appropriate equipment to lift or move patient in bed  - Instruct/ Assist with weight shifting every  when out of bed in chair  - Consider limitation of chair time  hour intervals    Skin Care:  - Avoid use of baby powder, tape, friction and shearing, hot water or constrictive clothing  - Relieve pressure over bony prominences using   - Do not massage red bony areas    Next Steps:  - Teach patient  strategies to minimize risks such as   - Consider consults to  interdisciplinary teams such as   Problem: SAFETY ADULT  Goal: Patient will remain free of falls  Description: INTERVENTIONS:  - Educate patient/family on patient safety including physical limitations  - Instruct patient to call for assistance with activity   - Consider consulting OT/PT to assist with strengthening/mobility based on AM PAC & JH-HLM score  - Consult OT/PT to assist with strengthening/mobility   - Keep Call bell within reach  - Keep bed low and locked with side rails adjusted as appropriate  - Keep care items and personal belongings within reach  - Initiate and maintain comfort rounds  - Make Fall Risk Sign visible to staff  - Offer Toileting every  Hours, in advance of need  - Initiate/Maintain alarm  - Obtain necessary fall risk management equipment:   - Apply yellow socks and bracelet for high fall risk patients  - Consider moving patient to room near nurses station  Outcome: Progressing     Outcome: Progressing

## 2025-06-05 NOTE — PROGRESS NOTES
Patient:    MRN:  [id number]    [place] Request ID:  [id number]    Level of care reserved:  Skilled Nursing Facility    Partner Reserved:  [facility], [place] [address] [phone]    Clinical needs requested:    Geography searched:  10 miles around [id number]    Start of Service:    Request sent:  3:18pm EDT on 6/3/2025 by Lupe Hancock (maggie)    Partner reserved:  3:19pm EDT on 6/5/2025 by Patti Damon    Choice list shared:

## 2025-06-05 NOTE — CASE MANAGEMENT
Case Management Discharge Planning Note    Patient name Mo Berry  Location [facility] 917/[facility] 917-01 MRN [id number]  : 1950 Date 2025       Current Admission Date: 2025  Current Admission Diagnosis:Syphilis   Patient Active Problem List    Diagnosis Date Noted    Rapidly progressive dementia (HCC) 2025    Encephalopathy acute 2025    Urinary incontinence 2025    Chronic kidney disease, stage 3b (HCC) 2025    Vitamin B12 deficiency 2025    Syphilis 2025    Hypercholesterolemia 2025    Memory deficit 2025    Tremor 2025    Primary osteoarthritis involving multiple joints 2025    Anemia 10/05/2017      LOS (days): 5  Geometric Mean LOS (GMLOS) (days): 4.7  Days to GMLOS:-0.2     OBJECTIVE:  Risk of Unplanned Readmission Score: 10.82         Current admission status: Inpatient   Preferred Pharmacy:   [id number] [place] [address]  [address]  [place] [address]  Phone: [phone] Fax: [phone]    Primary Care Provider: Selvin Dumont DO    Primary Insurance: MEDICARE  Secondary Insurance: [facility]    DISCHARGE DETAILS:    Discharge planning discussed with:: Ludivina  Freedom of Choice: Yes  Comments - Freedom of Choice: Discussed,  Master is choice.  CM contacted family/caregiver?: Yes  Were Treatment Team discharge recommendations reviewed with patient/caregiver?: Yes  Did patient/caregiver verbalize understanding of patient care needs?: N/A- going to facility       Contacts  Patient Contacts: Ludivina Berry  Relationship to Patient:: Family  Contact Method: Phone  Phone Number: [phone]  Reason/Outcome: Discharge Planning     IMM Given (Date):: 25  IMM Given to:: Family  Family notified:: Ludivina

## 2025-06-05 NOTE — CASE MANAGEMENT
Case Management Discharge Planning Note    Patient name Mo Berry  Location [facility] 917/[facility] 917-01 MRN [id number]  : 1950 Date 2025       Current Admission Date: 2025  Current Admission Diagnosis:Syphilis   Patient Active Problem List    Diagnosis Date Noted    Rapidly progressive dementia (HCC) 2025    Encephalopathy acute 2025    Urinary incontinence 2025    Chronic kidney disease, stage 3b (HCC) 2025    Vitamin B12 deficiency 2025    Syphilis 2025    Hypercholesterolemia 2025    Memory deficit 2025    Tremor 2025    Primary osteoarthritis involving multiple joints 2025    Anemia 10/05/2017      LOS (days): 5  Geometric Mean LOS (GMLOS) (days): 4.7  Days to GMLOS:-0.2     OBJECTIVE:  Risk of Unplanned Readmission Score: 10.82         Current admission status: Inpatient   Preferred Pharmacy:   [id number] [place] - [address]  [address]  [place] [address]  Phone: [phone] Fax: [phone]    Primary Care Provider: Selvin Dumont DO    Primary Insurance: MEDICARE  Secondary Insurance: [facility]    DISCHARGE DETAILS:     Call  made to Ludivina for choice, received VM, left message for call back.  Pt is cleared for DC.

## 2025-06-05 NOTE — ASSESSMENT & PLAN NOTE
In the setting of a patient with a progressive cognitive decline, with more acute deterioration in the last 8 weeks.  Differential diagnosis quite broad including infectious and noninfectious etiologies.  CT of the brain without a source.  HIV screen negative. As above, LP not consistent with neurosyphilis or infectious process in general.  -Appreciate neurology recommendations  -Follow-up pending CSF studies  -Additional workup as needed

## 2025-06-05 NOTE — ASSESSMENT & PLAN NOTE
With a positive treponemal test although negative RPR.  This is consistent with either previous treated syphilis versus waning RPR with long standing untreated infection.  Unable to obtain any detailed history as far as possible previous diagnosis and treatment.  With the patient's cognitive decline need to consider the possibility of neurosyphilis. LP results are not suggestive of neurosyphilis; normal WBC, normal protein, negative VDRL thus will treat as late latent infection. HIV screen negative.  -With negative LP, treat as late latent infection with benzathine penicillin 2,400,000 units weekly IM x 3 weeks; first dose was given 6/03  -Second dose due 6/10, third dose due 6/17  -If he goes to rehab facility, will ask CM to ensure his IM penicillin can be administered there  -If he is discharged home then will need to arrange for next two doses to be given at an infusion center

## 2025-06-05 NOTE — DISCHARGE INSTR - AVS FIRST PAGE
Patient requires two additional doses of IM Penicillin as part of complete treatment of late latent syphilis. First dose was given on 6/03. He will need  benzathine penicillin 2,400,000 units IM again on 6/10 and third/final dose on 6/17.

## 2025-06-05 NOTE — PROGRESS NOTES
Progress Note - Infectious Disease   Name: Mo Berry 75 y.o. male I MRN: [id number]  Unit/Bed#: [facility] 917-01 I Date of Admission: 5/31/2025   Date of Service: 6/5/2025 I Hospital Day: 5     Assessment & Plan  Syphilis  With a positive treponemal test although negative RPR.  This is consistent with either previous treated syphilis versus waning RPR with long standing untreated infection.  Unable to obtain any detailed history as far as possible previous diagnosis and treatment.  With the patient's cognitive decline need to consider the possibility of neurosyphilis. LP results are not suggestive of neurosyphilis; normal WBC, normal protein, negative VDRL thus will treat as late latent infection. HIV screen negative.  -With negative LP, treat as late latent infection with benzathine penicillin 2,400,000 units weekly IM x 3 weeks; first dose was given 6/03  -Second dose due 6/10, third dose due 6/17  -If he goes to rehab facility, will ask CM to ensure his IM penicillin can be administered there  -If he is discharged home then will need to arrange for next two doses to be given at an infusion center  Encephalopathy acute  In the setting of a patient with a progressive cognitive decline, with more acute deterioration in the last 8 weeks.  Differential diagnosis quite broad including infectious and noninfectious etiologies.  CT of the brain without a source.  HIV screen negative. As above, LP not consistent with neurosyphilis or infectious process in general.  -Appreciate neurology recommendations  -Follow-up pending CSF studies  -Additional workup as needed  Chronic kidney disease, stage 3b (HCC)  Renal function seems to be within baseline.  -Recheck BMP to make sure no worsening  -Volume management  -Dose adjust any antibiotics as needed  Vitamin B12 deficiency  Management per primary team    I have discussed the above management plan in detail with the primary service.  They agree with plan to treat patient for late  latent syphilis and other 2 doses of IM penicillin.    Antibiotics:      Subjective   Patient has no fever, chills, sweats.  Reains pleasantly confused..    Objective :  Temp:  [97.5 °F (36.4 °C)-97.9 °F (36.6 °C)] 97.5 °F (36.4 °C)  HR:  [64-84] 64  BP: ()/(59-65) 113/65  Resp:  [17] 17  SpO2:  [95 %-98 %] 97 %  O2 Device: None (Room air)    General:  No acute distress  Psychiatric:  Awake and alert  Pulmonary:  Normal respiratory excursion without accessory muscle use  Abdomen:  Soft, nontender  Extremities:  No edema  Skin:  No rashes        Lab Results: I have reviewed the following results:  Results from last 7 days   Lab Units 06/05/25 0510 06/04/25  0604 06/02/25  0956   WBC Thousand/uL 4.68 4.11* 4.64   HEMOGLOBIN g/dL 9.7* 9.7* 10.6*   PLATELETS Thousands/uL 196 186 204  204     Results from last 7 days   Lab Units 06/05/25  0510 06/04/25  0604 06/02/25  0930 05/31/25  1447   SODIUM mmol/L 138 138 137 141   POTASSIUM mmol/L 4.5 4.1 4.3 4.4   CHLORIDE mmol/L 104 108 106 110*   CO2 mmol/L 30 30 21 24   BUN mg/dL 25 24 25 28*   CREATININE mg/dL 1.46* 1.37* 1.37* 1.52*   EGFR ml/min/1.73sq m 46 50 50 44   CALCIUM mg/dL 9.3 9.1 9.5 9.6   AST U/L  --   --   --  20   ALT U/L  --   --   --  20   ALK PHOS U/L  --   --   --  77   ALBUMIN g/dL  --   --   --  4.0     Results from last 7 days   Lab Units 06/02/25  1417   GRAM STAIN RESULT  1+ Mononuclear Cells  No Polys  No organisms seen

## 2025-06-05 NOTE — PLAN OF CARE
Problem: PHYSICAL THERAPY ADULT  Goal: Performs mobility at highest level of function for planned discharge setting.  See evaluation for individualized goals.  Description: Treatment/Interventions: Functional transfer training, LE strengthening/ROM, Therapeutic exercise, Endurance training, Cognitive reorientation, Bed mobility, Continued evaluation, OT          See flowsheet documentation for full assessment, interventions and recommendations.  Outcome: Progressing  Note: Prognosis: Guarded  Problem List: Decreased strength, Decreased endurance, Impaired balance, Decreased mobility, Decreased coordination, Decreased cognition, Impaired judgement, Decreased safety awareness  Assessment: Pt demonstrated fair progress towards goals today. He was able to complete t/f and take a few steps w/ RW. Pt MaxAx1-2 w/ mobility. Per assessment appears to be limited more by decreased cognition + comprehension of instructions + motor planning compared to deficits in strength. Pt w/ short, shuffling steps, scissoring / narrow Lester during gait training. Pt w/ difficulty initiating gait training + required manual assist for forward progression of RW. Pt may benefit from B HHA - will assess next session w/ 3rd person for chair follow. Goals updated based on pt's progress.  Barriers to Discharge: Inaccessible home environment     Rehab Resource Intensity Level, PT: II (Moderate Resource Intensity)    See flowsheet documentation for full assessment.

## 2025-06-05 NOTE — PHYSICAL THERAPY NOTE
Physical Therapy Treatment Note    Patient's Name: Mo Berry  : 1950     06/05/25 1005   PT Last Visit   PT Visit Date 25   Note Type   Note Type Treatment   Pain Assessment   Pain Score No Pain   Restrictions/Precautions   Weight Bearing Precautions Per Order No   Other Precautions Cognitive;Chair Alarm;Bed Alarm;Fall Risk   General   Chart Reviewed Yes   Response to Previous Treatment Patient unable to report, no changes reported from family or staff   Family/Caregiver Present No   Subjective   Subjective Pleasant + agreeable. Difficulty w/ articulation of speech.   Bed Mobility   Supine to Sit 2  Maximal assistance   Additional items Assist x 2;Increased time required;Verbal cues;LE management;HOB elevated   Additional Comments Pt greeted in supine. Difficulty w/ motor planning / comprehension of instructions.   Transfers   Sit to Stand 2  Maximal assistance   Additional items Assist x 2;Assist x 1   Stand to Sit 2  Maximal assistance   Additional items Assist x 2;Assist x 1   Additional Comments MaxAx2 from elevated bed w/ B HHA, MaxAx1 from recliner w/ RW; cues for hand placement + full hip + knee extension   Ambulation/Elevation   Gait pattern Excessively slow;Short stride;Scissoring;Shuffling;Decreased foot clearance;Decreased heel strike;Improper Weight shift   Gait Assistance 2  Maximal assist   Additional items Tactile cues;Verbal cues;Assist x 1  (+ 2nd for chair follow)   Assistive Device Rolling walker   Distance 10'   Stair Management Assistance Not tested   Ambulation/Elevation Additional Comments poor forward progression w/ RW, possible difficulty understanding new device w/ cognitive impairment   Balance   Static Sitting Poor +   Dynamic Sitting Poor   Static Standing Poor -   Dynamic Standing Poor -   Ambulatory Poor -   Endurance Deficit   Endurance Deficit Yes   Endurance Deficit Description fatigue, weakness   Activity Tolerance   Activity Tolerance Patient limited by  fatigue  (impaired cog)   Medical Staff Made Aware OT Nidia   Nurse Made Aware yes - cleared for therapy   Assessment   Prognosis Guarded   Problem List Decreased strength;Decreased endurance;Impaired balance;Decreased mobility;Decreased coordination;Decreased cognition;Impaired judgement;Decreased safety awareness   Assessment Pt demonstrated fair progress towards goals today. He was able to complete t/f and take a few steps w/ RW. Pt MaxAx1-2 w/ mobility. Per assessment appears to be limited more by decreased cognition + comprehension of instructions + motor planning compared to deficits in strength. Pt w/ short, shuffling steps, scissoring / narrow Lester during gait training. Pt w/ difficulty initiating gait training + required manual assist for forward progression of RW. Pt may benefit from B HHA - will assess next session w/ 3rd person for chair follow. Goals updated based on pt's progress.   Barriers to Discharge Inaccessible home environment   Goals   Patient Goals none expressed   Short Term Goal #2 In addition to previously established goals pt will: 1) perform all functional t/f w/ S, 2) ambulate 150' w/ LRAD + S,and 3) negotiate a FFOS w/ CGA + most appropriate technique.   Plan   Treatment/Interventions Functional transfer training;LE strengthening/ROM;Elevations;Therapeutic exercise;Endurance training;Patient/family training;Equipment eval/education;Bed mobility;Gait training;Compensatory technique education;Spoke to nursing;OT   Progress Progressing toward goals   PT Frequency 2-3x/wk   Discharge Recommendation   Rehab Resource Intensity Level, PT II (Moderate Resource Intensity)   AM-PAC Basic Mobility Inpatient   Turning in Flat Bed Without Bedrails 2   Lying on Back to Sitting on Edge of Flat Bed Without Bedrails 1   Moving Bed to Chair 1   Standing Up From Chair Using Arms 2   Walk in Room 1   Climb 3-5 Stairs With Railing 1   Basic Mobility Inpatient Raw Score 8   [facility] Highest Level Of  Mobility   JH-HLM Goal 3: Sit at edge of bed   JH-HLM Achieved 6: Walk 10 steps or more   Education   Education Provided Mobility training;Assistive device   Patient Demonstrates acceptance/verbal understanding;Reinforcement needed   End of Consult   Patient Position at End of Consult Bedside chair;Bed/Chair alarm activated;All needs within reach  (on waffle cushion)       Sally Fields, PT, DPT

## 2025-06-05 NOTE — PLAN OF CARE
Problem: OCCUPATIONAL THERAPY ADULT  Goal: Performs self-care activities at highest level of function for planned discharge setting.  See evaluation for individualized goals.  Description: Treatment Interventions: ADL retraining, Functional transfer training, Endurance training, Cognitive reorientation, Patient/family training, Equipment evaluation/education, Compensatory technique education, Continued evaluation, Energy conservation, Activityengagement          See flowsheet documentation for full assessment, interventions and recommendations.   Outcome: Progressing  Note: Limitation: Decreased ADL status, Decreased Safe judgement during ADL, Decreased cognition, Decreased endurance, Decreased self-care trans, Decreased high-level ADLs     Assessment: Pt seen for skilled OT treatment session from 0942 to 1006 w/ interventions focusing on ADL participation, activity tolerance, sitting tolerance, sitting balance, standing tolerance, standing balance, bed mobility , transfer skills, fxnl mobility, and cognitive re-orientation . Pt was agreeable and willing to participate in session. Pt engaged in the following tasks: mod A for UB dressing and max A for LB dressing. Pt also required max Ax2 for bed mobility and max Ax1 for transfers and fxnl mobility w/ RW. In comparison to previous session, pt demonstrated improvements in ADLs and mobility tasks as he required less physical assistance for UB dressing today and he was able to participate in transfers and fxnl mobility on this date. Pt continues to be functioning below baseline level as occupational performance remains limited by decreased ADL status, decreased activity tolerance, decreased endurance, decreased sitting tolerance, decreased sitting balance, decreased standing tolerance, decreased standing balance, decreased transfer skills, decreased fxnl mobility, decreased safety awareness, decreased insight into deficits, and impaired cognition . From OT standpoint,  recommend Level II (Moderate Resource Intensity) at time of d/c. Pt will benefit from continued OT treatment while in acute care to address deficits as defined above and maximize level of functional independence with ADLs and functional mobility. Pt seated OOB in chair w/ alarm activated and all needs met at end of session.     Rehab Resource Intensity Level, OT: II (Moderate Resource Intensity)

## 2025-06-05 NOTE — ASSESSMENT & PLAN NOTE
Lab Results   Component Value Date    EGFR 46 06/05/2025    EGFR 50 06/04/2025    EGFR 50 06/02/2025    CREATININE 1.46 (H) 06/05/2025    CREATININE 1.37 (H) 06/04/2025    CREATININE 1.37 (H) 06/02/2025    -Cr stable

## 2025-06-05 NOTE — ASSESSMENT & PLAN NOTE
-Patient had rapid decline previously independent 6 weeks prior and oriented x 4 according to the daughter.  Deteriorated the past 6 weeks where now patient is fully dependent with all ADLs and is no longer oriented and just recently started having bowel and bladder incontinence  -TSH normal,  -HCV, Lyme, heavy metals, HIV were all negative  -RPR was positive and treponema antibody screen was also positive  -ID/neuro following  -There was concern for neurosyphilis. LP was done 6/2/25: CSF culture/prot/gluc, meningitis encephalitis panel, JANELL virus, cryptococcus antigen, CSF VDRL normal  -MRI brain: Limited exam without acute infarct. NeuroQuant analysis was performed: Images are severely motion limited. Quantitative analysis does not demonstrate evidence of mesial temporal focus volume loss, however the results may be unreliable. Recommend repeat exam when possible.   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 with benzathine penicillin 2,400,000 units weekly IM x 3 weeks 6/3, 6/10, 6/17

## 2025-06-06 LAB
ANION GAP SERPL CALCULATED.3IONS-SCNC: 4 MMOL/L (ref 4–13)
BUN SERPL-MCNC: 29 MG/DL (ref 5–25)
CALCIUM SERPL-MCNC: 9.2 MG/DL (ref 8.4–10.2)
CHLORIDE SERPL-SCNC: 105 MMOL/L (ref 96–108)
CO2 SERPL-SCNC: 28 MMOL/L (ref 21–32)
CREAT SERPL-MCNC: 1.49 MG/DL (ref 0.6–1.3)
ERYTHROCYTE [DISTWIDTH] IN BLOOD BY AUTOMATED COUNT: 13.4 % (ref 11.6–15.1)
GFR SERPL CREATININE-BSD FRML MDRD: 45 ML/MIN/1.73SQ M
GLUCOSE SERPL-MCNC: 79 MG/DL (ref 65–140)
HCT VFR BLD AUTO: 30 % (ref 36.5–49.3)
HGB BLD-MCNC: 9.5 G/DL (ref 12–17)
JCPYV DNA CSF QL NAA+PROBE: NEGATIVE
MCH RBC QN AUTO: 28.9 PG (ref 26.8–34.3)
MCHC RBC AUTO-ENTMCNC: 31.7 G/DL (ref 31.4–37.4)
MCV RBC AUTO: 91 FL (ref 82–98)
MISCELLANEOUS LAB TEST RESULT: NORMAL
PLATELET # BLD AUTO: 193 THOUSANDS/UL (ref 149–390)
PMV BLD AUTO: 11.4 FL (ref 8.9–12.7)
POTASSIUM SERPL-SCNC: 4.5 MMOL/L (ref 3.5–5.3)
RBC # BLD AUTO: 3.29 MILLION/UL (ref 3.88–5.62)
SCAN RESULT: NORMAL
SODIUM SERPL-SCNC: 137 MMOL/L (ref 135–147)
VIT B6 SERPL-MCNC: 5 UG/L (ref 3.4–65.2)
WBC # BLD AUTO: 4.57 THOUSAND/UL (ref 4.31–10.16)

## 2025-06-06 PROCEDURE — 80048 BASIC METABOLIC PNL TOTAL CA: CPT | Performed by: HOSPITALIST

## 2025-06-06 PROCEDURE — 99232 SBSQ HOSP IP/OBS MODERATE 35: CPT | Performed by: HOSPITALIST

## 2025-06-06 PROCEDURE — 88312 SPECIAL STAINS GROUP 1: CPT | Performed by: STUDENT IN AN ORGANIZED HEALTH CARE EDUCATION/TRAINING PROGRAM

## 2025-06-06 PROCEDURE — 85027 COMPLETE CBC AUTOMATED: CPT | Performed by: HOSPITALIST

## 2025-06-06 PROCEDURE — 97535 SELF CARE MNGMENT TRAINING: CPT

## 2025-06-06 PROCEDURE — 88108 CYTOPATH CONCENTRATE TECH: CPT | Performed by: STUDENT IN AN ORGANIZED HEALTH CARE EDUCATION/TRAINING PROGRAM

## 2025-06-06 RX ADMIN — HEPARIN SODIUM 5000 UNITS: 5000 INJECTION INTRAVENOUS; SUBCUTANEOUS at 13:41

## 2025-06-06 RX ADMIN — HEPARIN SODIUM 5000 UNITS: 5000 INJECTION INTRAVENOUS; SUBCUTANEOUS at 21:38

## 2025-06-06 RX ADMIN — HEPARIN SODIUM 5000 UNITS: 5000 INJECTION INTRAVENOUS; SUBCUTANEOUS at 05:04

## 2025-06-06 NOTE — CASE MANAGEMENT
Case Management Discharge Planning Note    Patient name Mo Berry  Location [facility] 917/[facility] 917-01 MRN [id number]  : 1950 Date 2025       Current Admission Date: 2025  Current Admission Diagnosis:Syphilis   Patient Active Problem List    Diagnosis Date Noted    Rapidly progressive dementia (HCC) 2025    Encephalopathy acute 2025    Urinary incontinence 2025    Chronic kidney disease, stage 3b (HCC) 2025    Vitamin B12 deficiency 2025    Syphilis 2025    Hypercholesterolemia 2025    Memory deficit 2025    Tremor 2025    Primary osteoarthritis involving multiple joints 2025    Anemia 10/05/2017      LOS (days): 6  Geometric Mean LOS (GMLOS) (days): 4.7  Days to GMLOS:-1     OBJECTIVE:  Risk of Unplanned Readmission Score: 12.44         Current admission status: Inpatient   Preferred Pharmacy:   [id number] [place] - [address]  [address]  [place] [address]  Phone: [phone] Fax: [phone]    Primary Care Provider: Selvin Dumont DO    Primary Insurance: MEDICARE  Secondary Insurance: [facility]    DISCHARGE DETAILS:    Other Referral/Resources/Interventions Provided:  Referral Comments: Master sent message stating pt can come tomorrow.  CM requested to set up transporation today for facility tomorrow, pending confirmation from facility.  Provider updated.

## 2025-06-06 NOTE — PROGRESS NOTES
Progress Note - Hospitalist   Name: Mo Berry 75 y.o. male I MRN: [id number]  Unit/Bed#: [facility] 917-01 I Date of Admission: 5/31/2025   Date of Service: 6/6/2025 I Hospital Day: 6    Assessment & Plan  Syphilis  -Patient had rapid decline previously independent 6 weeks prior and oriented x 4 according to the daughter.  Deteriorated the past 6 weeks where now patient is fully dependent with all ADLs and is no longer oriented and just recently started having bowel and bladder incontinence  -TSH normal,  -HCV, Lyme, heavy metals, HIV were all negative  -RPR was positive and treponema antibody screen was also positive  -ID/neuro following  -There was concern for neurosyphilis. LP was done 6/2/25: CSF culture/prot/gluc, meningitis encephalitis panel, JANELL virus, cryptococcus antigen, CSF VDRL normal  -MRI brain: Limited exam without acute infarct. NeuroQuant analysis was performed: Images are severely motion limited. Quantitative analysis does not demonstrate evidence of mesial temporal focus volume loss, however the results may be unreliable. Recommend repeat exam when possible.   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 with benzathine penicillin 2,400,000 units weekly IM x 3 weeks 6/3, 6/10, 6/17    Chronic kidney disease, stage 3b (HCC)  Lab Results   Component Value Date    EGFR 45 06/06/2025    EGFR 46 06/05/2025    EGFR 50 06/04/2025    CREATININE 1.49 (H) 06/06/2025    CREATININE 1.46 (H) 06/05/2025    CREATININE 1.37 (H) 06/04/2025    -Cr stable  Encephalopathy acute  - Neuro following, etiology remains unclear at this time  - MRI brain above and without acute intracranial pathology  - EEG 6/2 without epileptiform activity  - Autoimmune dementia profile negative  - Further workup as above, so far unremarkable  - Will need outpatient neuropsych/cognitive testing  Rapidly progressive dementia (HCC)  -see above  Vitamin B12 deficiency  Recent vitamin B12 level is 87 showing vitamin B12 deficiency and pt was started  on supplementation by PCP as outpatient.  B12 level > 7500.    Anemia  -Hb stable  -normocytic    VTE Pharmacologic Prophylaxis:   Heparin    Mobility:   Basic Mobility Inpatient Raw Score: 11  JH-HLM Goal: 4: Move to chair/commode  JH-HLM Achieved: 4: Move to chair/commode  JH-HLM Goal achieved. Continue to encourage appropriate mobility.    Patient Centered Rounds: I performed bedside rounds with nursing staff today.     Current Length of Stay: 6 day(s)  Current Patient Status: Inpatient   Discharge Plan: Remains medically cleared for discharge.    Code Status: Level 1 - Full Code    Subjective   Patient sleeping.  Awakens to voice but does not answer questions.    Objective :  Temp:  [97.8 °F (36.6 °C)-98.4 °F (36.9 °C)] 97.8 °F (36.6 °C)  HR:  [62-81] 62  BP: ()/(66-75) 115/66  Resp:  [15-17] 17  SpO2:  [94 %-96 %] 94 %  O2 Device: None (Room air)    Body mass index is 22.14 kg/m².     Input and Output Summary (last 24 hours):     Intake/Output Summary (Last 24 hours) at 6/6/2025 1023  Last data filed at 6/5/2025 1100  Gross per 24 hour   Intake 240 ml   Output --   Net 240 ml       Physical Exam  Gen: remains NAD, Awake and alert but not oriented, well developed, well nourished  Eyes: EOMI, PERRLA, no scleral icterus  ENMT:  no nasal discharge, no otic discharge, moist mucous membranes  Neck:  Supple  Cardiovascular: Continues to remain regular rate and rhythm, normal S1-S2, no murmurs, rubs, or gallops  Lungs: To remain clear to auscultation bilaterally anteriorly, no wheezes, or rales, or rhonchi  Abdomen: Remains positive bowel sounds, soft, nontender, nondistended  Skin:  Intact, no obvious lesions or rashes, no edema  Neuro: Cranial nerves 2-12 are intact, non-focal    Lines/Drains:              Lab Results: I have reviewed the following results:   Results from last 7 days   Lab Units 06/06/25  0548 06/02/25  0956 05/31/25  1447   WBC Thousand/uL 4.57   < > 3.93*   HEMOGLOBIN g/dL 9.5*   < > 10.7*    HEMATOCRIT % 30.0*   < > 33.7*   PLATELETS Thousands/uL 193   < > 216   SEGS PCT %  --   --  58   LYMPHO PCT %  --   --  32   MONO PCT %  --   --  7   EOS PCT %  --   --  2    < > = values in this interval not displayed.     Results from last 7 days   Lab Units 06/06/25  0548 06/02/25  0930 05/31/25  1447   SODIUM mmol/L 137   < > 141   POTASSIUM mmol/L 4.5   < > 4.4   CHLORIDE mmol/L 105   < > 110*   CO2 mmol/L 28   < > 24   BUN mg/dL 29*   < > 28*   CREATININE mg/dL 1.49*   < > 1.52*   ANION GAP mmol/L 4   < > 7   CALCIUM mg/dL 9.2   < > 9.6   ALBUMIN g/dL  --   --  4.0   TOTAL BILIRUBIN mg/dL  --   --  0.32   ALK PHOS U/L  --   --  77   ALT U/L  --   --  20   AST U/L  --   --  20   GLUCOSE RANDOM mg/dL 79   < > 135    < > = values in this interval not displayed.     Results from last 7 days   Lab Units 06/02/25  0930   INR  1.01     Results from last 7 days   Lab Units 06/05/25  1117   POC GLUCOSE mg/dl 145*               Recent Cultures (last 7 days):   Results from last 7 days   Lab Units 06/02/25  1417   GRAM STAIN RESULT  1+ Mononuclear Cells  No Polys  No organisms seen       Last 24 Hours Medication List:     Current Facility-Administered Medications:     acetaminophen (TYLENOL) tablet 650 mg, Q6H PRN    heparin (porcine) subcutaneous injection 5,000 Units, Q8H PHILLY    ondansetron (ZOFRAN) injection 4 mg, Q6H PRN    Penicillin G Benzathine (BICILLIN L-A) intramuscular injection 2.4 Million Units, Weekly    Administrative Statements   Today, Patient Was Seen By: Enmanuel Dixon MD      **Please Note: This note may have been constructed using a voice recognition system.**

## 2025-06-06 NOTE — PLAN OF CARE
Problem: OCCUPATIONAL THERAPY ADULT  Goal: Performs self-care activities at highest level of function for planned discharge setting.  See evaluation for individualized goals.  Description: Treatment Interventions: ADL retraining, Functional transfer training, Endurance training, Cognitive reorientation, Patient/family training, Equipment evaluation/education, Compensatory technique education, Continued evaluation, Energy conservation, Activityengagement          See flowsheet documentation for full assessment, interventions and recommendations.   Outcome: Progressing  Note: Limitation: Decreased ADL status, Decreased Safe judgement during ADL, Decreased cognition, Decreased endurance, Decreased self-care trans, Decreased high-level ADLs     Assessment: Pt seen for OT treatment session on this date focused on ADL retraining, functional transfers and mobility, energy conservation, functional endurance, recall of safety precautions, and patient/caregiver education. Pt was greeted supine in bed upon arrival and cooperative throughout session. Pt unable to respond to orientation questions this date. Requires mod a x 2 for bed mobility. Max a x 2 required for UB/LB bathing seated EOB this date. Max a for don/dof of hospital gown seated EOB. Performs STS w/ mod a x 1 and stand pivot mod a x 2 w/ from bed to bedside chair, verbal and tactile cues for moving feet. Following session, pt was left in chair with chair alarm on and all needs within reach. Pt continues to be limited by functional status related to ADLs and functional mobility although demonstrates improvements in transfers this date. The patient's raw score on the AM-PAC Daily Activity Inpatient Short Form is 13. A raw score of less than 19 suggests the patient may benefit from discharge to post-acute rehabilitation services. Please refer to the recommendation of the Occupational Therapist for safe discharge planning. Pt would benefit from continued acute OT  Patient BiB daughter for GLF with forehead lac and lac on back of head. Patient denies LOC, patient isnt on blood thinner, and patient has had no n/v.    intervention with plan to continue OT treatment sessions 2-3x per week. Recommend d/c to level two services.     Rehab Resource Intensity Level, OT: II (Moderate Resource Intensity)

## 2025-06-06 NOTE — OCCUPATIONAL THERAPY NOTE
Occupational Therapy Progress Note     Patient Name: Mo Berry  Today's Date: 6/6/2025  Problem List  Principal Problem:    Syphilis  Active Problems:    Anemia    Vitamin B12 deficiency    Chronic kidney disease, stage 3b (HCC)    Encephalopathy acute    Rapidly progressive dementia (HCC)            06/06/25 1415   OT Last Visit   OT Visit Date 06/06/25   Note Type   Note Type Treatment   Pain Assessment   Pain Assessment Tool FLACC   Pain Score No Pain   Pain Rating: FLACC (Rest) - Face 0   Pain Rating: FLACC (Rest) - Legs 0   Pain Rating: FLACC (Rest) - Activity 0   Pain Rating: FLACC (Rest) - Cry 0   Pain Rating: FLACC (Rest) - Consolability 0   Score: FLACC (Rest) 0   Pain Rating: FLACC (Activity) - Face 1   Pain Rating: FLACC (Activity) - Legs 0   Pain Rating: FLACC (Activity) - Activity 0   Pain Rating: FLACC (Activity) - Cry 0   Pain Rating: FLACC (Activity) - Consolability 0   Score: FLACC (Activity) 1   Restrictions/Precautions   Weight Bearing Precautions Per Order No   Other Precautions Cognitive;Chair Alarm;Bed Alarm;Telemetry;Fall Risk   Lifestyle   Autonomy Per chart review, pt has been requiring increased physical assistance w/ ADLs and fxnl mobility for past few months. Pt is I w/ ADLs and fxnl mobility at his true baseline.   Reciprocal Relationships Pt lives w/ his daughter and her family.   Service to Others Pt is retired.   Intrinsic Gratification none stated; will continue to assess   ADL   Where Assessed Edge of bed   Grooming Assistance 2  Maximal Assistance   Grooming Deficit Supervision/safety;Increased time to complete;Wash/dry hands;Steadying   Grooming Comments Pt requires max ax2 for grooming seated EOB this date.  (One individual to assist pt in upright and another to sponge bathe.)   UB Bathing Assistance 2  Maximal Assistance   UB Bathing Deficit Supervision/safety;Increased time to complete;Chest;Right arm;Abdomen;Left arm;Perineal area;Buttocks   UB Bathing Comments Pt  "requires max x 2 for UB bathing seated EOB.   LB Bathing Assistance 2  Maximal Assistance   LB Bathing Deficit Setup;Steadying;Supervision/safety;Increased time to complete;Right upper leg;Left upper leg;Left lower leg including foot;Right lower leg including foot   LB Bathing Comments Pt requires max x 2 for LB bathing seated EOB.   UB Dressing Deficit Supervision/safety;Increased time to complete;Thread RUE;Thread LUE   UB Dressing Comments Pt requires max a to don and [facility] gown seated EOB this date.   Functional Standing Tolerance   Time ~1-2 minutes   Activity Standing for sponge bathe and sheet change.   Comments b/l HHA   Bed Mobility   Supine to Sit 3  Moderate assistance   Additional items Assist x 2;Increased time required;Verbal cues;LE management   Sit to Supine Unable to assess   Additional Comments Pt greeted supine in bed upon arrival.   Transfers   Sit to Stand 3  Moderate assistance   Additional items Assist x 1;Increased time required   Stand to Sit 3  Moderate assistance   Additional items Assist x 1;Increased time required;Verbal cues   Stand pivot 3  Moderate assistance   Additional items Assist x 2;Increased time required;Verbal cues   Additional Comments Pt requires mod a x 1 for STS and mod a x 2 for stand pivot to chair this date. Requires cues verbal and tactile for safety and moving feet.   Subjective   Subjective \"Thank you\"   Cognition   Overall Cognitive Status Impaired   Arousal/Participation Responsive;Cooperative;Lethargic   Attention Attends with cues to redirect   Orientation Level Unable to assess   Memory Decreased recall of biographical information;Decreased recall of recent events;Decreased recall of precautions;Decreased short term memory   Following Commands Follows one step commands with increased time or repetition   Comments Pt cooperative to work w/ therapy and get OOB into chair this date and get washed up. Unable to respond to orientation questions this date. "   Assessment   Assessment Pt seen for OT treatment session on this date focused on ADL retraining, functional transfers and mobility, energy conservation, functional endurance, recall of safety precautions, and patient/caregiver education. Pt was greeted supine in bed upon arrival and cooperative throughout session. Pt unable to respond to orientation questions this date. Requires mod a x 2 for bed mobility. Max a x 2 required for UB/LB bathing seated EOB this date. Max a for don/dof of hospital gown seated EOB. Performs STS w/ mod a x 1 and stand pivot mod a x 2 w/ from bed to bedside chair, verbal and tactile cues for moving feet. Following session, pt was left in chair with chair alarm on and all needs within reach. Pt continues to be limited by functional status related to ADLs and functional mobility although demonstrates improvements in transfers this date. The patient's raw score on the AM-PAC Daily Activity Inpatient Short Form is 13. A raw score of less than 19 suggests the patient may benefit from discharge to post-acute rehabilitation services. Please refer to the recommendation of the Occupational Therapist for safe discharge planning. Pt would benefit from continued acute OT intervention with plan to continue OT treatment sessions 2-3x per week. Recommend d/c to level two services.   Plan   Treatment Interventions ADL retraining;Functional transfer training;Endurance training;Cognitive reorientation;Patient/family training;Equipment evaluation/education;Compensatory technique education;Continued evaluation;Activityengagement;Energy conservation   Goal Expiration Date 06/16/25   OT Treatment Day 2   OT Frequency 2-3x/wk   Discharge Recommendation   Rehab Resource Intensity Level, OT II (Moderate Resource Intensity)   AM-PAC Daily Activity Inpatient   Lower Body Dressing 2   Bathing 2   Toileting 2   Upper Body Dressing 2   Grooming 2   Eating 3   Daily Activity Raw Score 13   Daily Activity Standardized  Score (Calc for Raw Score >=11) 32.03   AM-PAC Applied Cognition Inpatient   Following a Speech/Presentation 1   Understanding Ordinary Conversation 2   Taking Medications 1   Remembering Where Things Are Placed or Put Away 1   Remembering List of 4-5 Errands 1   Taking Care of Complicated Tasks 1   Applied Cognition Raw Score 7   Applied Cognition Standardized Score 15.17   End of Consult   Education Provided Yes   Patient Position at End of Consult Bedside chair;Bed/Chair alarm activated;All needs within reach   Nurse Communication Nurse aware of consult   End of Consult Comments Pt left seated in bedside chair w/ alarm on and all needs within reach.           Sally Sanchez, OTFAY, OTR/L

## 2025-06-06 NOTE — ASSESSMENT & PLAN NOTE
Lab Results   Component Value Date    EGFR 45 06/06/2025    EGFR 46 06/05/2025    EGFR 50 06/04/2025    CREATININE 1.49 (H) 06/06/2025    CREATININE 1.46 (H) 06/05/2025    CREATININE 1.37 (H) 06/04/2025    -Cr stable

## 2025-06-06 NOTE — CASE MANAGEMENT
Case Management Discharge Planning Note    Patient name Mo Huston [facility] 917/[facility] 917-01 MRN [id number]  : 1950 Date 2025       Current Admission Date: 2025  Current Admission Diagnosis:Syphilis   Patient Active Problem List    Diagnosis Date Noted    Rapidly progressive dementia (HCC) 2025    Encephalopathy acute 2025    Urinary incontinence 2025    Chronic kidney disease, stage 3b (HCC) 2025    Vitamin B12 deficiency 2025    Syphilis 2025    Hypercholesterolemia 2025    Memory deficit 2025    Tremor 2025    Primary osteoarthritis involving multiple joints 2025    Anemia 10/05/2017      LOS (days): 6  Geometric Mean LOS (GMLOS) (days): 4.7  Days to GMLOS:-1.3     OBJECTIVE:  Risk of Unplanned Readmission Score: 12.5         Current admission status: Inpatient   Preferred Pharmacy:   [id number] [place] - [address]  [address]  [place] [address]  Phone: [phone] Fax: [phone]    Primary Care Provider: Selvin Dumont DO    Primary Insurance: MEDICARE  Secondary Insurance: [facility]    DISCHARGE DETAILS:   Transportation requested for 1130 AM 25 to [place], pending confirmation.     CM will update pt, family, provider, and facility once confirmed.

## 2025-06-07 VITALS
TEMPERATURE: 97.8 F | HEART RATE: 82 BPM | OXYGEN SATURATION: 94 % | HEIGHT: 70 IN | DIASTOLIC BLOOD PRESSURE: 75 MMHG | RESPIRATION RATE: 16 BRPM | BODY MASS INDEX: 22.09 KG/M2 | WEIGHT: 154.32 LBS | SYSTOLIC BLOOD PRESSURE: 111 MMHG

## 2025-06-07 LAB
ANION GAP SERPL CALCULATED.3IONS-SCNC: 7 MMOL/L (ref 4–13)
BUN SERPL-MCNC: 30 MG/DL (ref 5–25)
CALCIUM SERPL-MCNC: 9.1 MG/DL (ref 8.4–10.2)
CHLORIDE SERPL-SCNC: 101 MMOL/L (ref 96–108)
CO2 SERPL-SCNC: 24 MMOL/L (ref 21–32)
CREAT SERPL-MCNC: 1.52 MG/DL (ref 0.6–1.3)
ERYTHROCYTE [DISTWIDTH] IN BLOOD BY AUTOMATED COUNT: 13.4 % (ref 11.6–15.1)
GFR SERPL CREATININE-BSD FRML MDRD: 44 ML/MIN/1.73SQ M
GLUCOSE SERPL-MCNC: 91 MG/DL (ref 65–140)
HCT VFR BLD AUTO: 35.2 % (ref 36.5–49.3)
HGB BLD-MCNC: 10.8 G/DL (ref 12–17)
MCH RBC QN AUTO: 28.9 PG (ref 26.8–34.3)
MCHC RBC AUTO-ENTMCNC: 30.7 G/DL (ref 31.4–37.4)
MCV RBC AUTO: 94 FL (ref 82–98)
PLATELET # BLD AUTO: 221 THOUSANDS/UL (ref 149–390)
PMV BLD AUTO: 11.2 FL (ref 8.9–12.7)
POTASSIUM SERPL-SCNC: 4.7 MMOL/L (ref 3.5–5.3)
RBC # BLD AUTO: 3.74 MILLION/UL (ref 3.88–5.62)
SODIUM SERPL-SCNC: 132 MMOL/L (ref 135–147)
VIT B1 BLD-SCNC: 63.6 NMOL/L (ref 66.5–200)
WBC # BLD AUTO: 4.37 THOUSAND/UL (ref 4.31–10.16)

## 2025-06-07 PROCEDURE — 80048 BASIC METABOLIC PNL TOTAL CA: CPT | Performed by: HOSPITALIST

## 2025-06-07 PROCEDURE — 99239 HOSP IP/OBS DSCHRG MGMT >30: CPT

## 2025-06-07 PROCEDURE — 85027 COMPLETE CBC AUTOMATED: CPT | Performed by: HOSPITALIST

## 2025-06-07 RX ORDER — ONDANSETRON 4 MG/1
4 TABLET, FILM COATED ORAL EVERY 8 HOURS PRN
Qty: 30 TABLET | Refills: 0 | Status: SHIPPED | OUTPATIENT
Start: 2025-06-07

## 2025-06-07 RX ADMIN — HEPARIN SODIUM 5000 UNITS: 5000 INJECTION INTRAVENOUS; SUBCUTANEOUS at 06:17

## 2025-06-07 NOTE — PLAN OF CARE
Problem: Prexisting or High Potential for Compromised Skin Integrity  Goal: Skin integrity is maintained or improved  Description: INTERVENTIONS:  - Identify patients at risk for skin breakdown  - Assess and monitor skin integrity including under and around medical devices   - Assess and monitor nutrition and hydration status  - Monitor labs  - Assess for incontinence   - Turn and reposition patient  - Assist with mobility/ambulation  - Relieve pressure over shira prominences   - Avoid friction and shearing  - Provide appropriate hygiene as needed including keeping skin clean and dry  - Evaluate need for skin moisturizer/barrier cream  - Collaborate with interdisciplinary team  - Patient/family teaching  - Consider wound care consult    Assess:  - Review Denzel scale daily  - Clean and moisturize skin every   - Inspect skin when repositioning, toileting, and assisting with ADLS  - Assess under medical devices such as  every   - Assess extremities for adequate circulation and sensation     Bed Management:  - Have minimal linens on bed & keep smooth, unwrinkled  - Change linens as needed when moist or perspiring  - Avoid sitting or lying in one position for more than  hours while in bed?Keep HOB at degrees   - Toileting:  - Offer bedside commode  - Assess for incontinence every   - Use incontinent care products after each incontinent episode such as     Activity:  - Mobilize patient  times a day  - Encourage activity and walks on unit  - Encourage or provide ROM exercises   - Turn and reposition patient every Hours  - Use appropriate equipment to lift or move patient in bed  - Instruct/ Assist with weight shifting every  when out of bed in chair  - Consider limitation of chair time  hour intervals    Skin Care:  - Avoid use of baby powder, tape, friction and shearing, hot water or constrictive clothing  - Relieve pressure over bony prominences using   - Do not massage red bony areas    Next Steps:  - Teach patient  strategies to minimize risks such as   - Consider consults to  interdisciplinary teams such as Outcome: Progressing     Problem: SAFETY ADULT  Goal: Patient will remain free of falls  Description: INTERVENTIONS:  - Educate patient/family on patient safety including physical limitations  - Instruct patient to call for assistance with activity   - Consider consulting OT/PT to assist with strengthening/mobility based on AM PAC & JH-HLM score  - Consult OT/PT to assist with strengthening/mobility   - Keep Call bell within reach  - Keep bed low and locked with side rails adjusted as appropriate  - Keep care items and personal belongings within reach  - Initiate and maintain comfort rounds  - Make Fall Risk Sign visible to staff  - Offer Toileting every  Hours, in advance of need  - Initiate/Maintain alarm  - Obtain necessary fall risk management equipment:   - Apply yellow socks and bracelet for high fall risk patients  - Consider moving patient to room near nurses station  Outcome: Progressing  Goal: Maintain or return to baseline ADL function  Description: INTERVENTIONS:  -  Assess patient's ability to carry out ADLs; assess patient's baseline for ADL function and identify physical deficits which impact ability to perform ADLs (bathing, care of mouth/teeth, toileting, grooming, dressing, etc.)  - Assess/evaluate cause of self-care deficits   - Assess range of motion  - Assess patient's mobility; develop plan if impaired  - Assess patient's need for assistive devices and provide as appropriate  - Encourage maximum independence but intervene and supervise when necessary  - Involve family in performance of ADLs  - Assess for home care needs following discharge   - Consider OT consult to assist with ADL evaluation and planning for discharge  - Provide patient education as appropriate  - Monitor functional capacity and physical performance, use of AM PAC & JH-HLM   - Monitor gait, balance and fatigue with  ambulation    Outcome: Progressing  Goal: Maintains/Returns to pre admission functional level  Description: INTERVENTIONS:  - Perform AM-PAC 6 Click Basic Mobility/ Daily Activity assessment daily.  - Set and communicate daily mobility goal to care team and patient/family/caregiver.   - Collaborate with rehabilitation services on mobility goals if consulted  - Perform Range of Motion  times a day.  - Reposition patient every hours.  - Dangle patient  times a day  - Stand patient times a day  - Ambulate patient  times a day  - Out of bed to chair  times a day   - Out of bed for meals  times a day  - Out of bed for toileting  - Record patient progress and toleration of activity level   Outcome: Progressing

## 2025-06-07 NOTE — NURSING NOTE
Pt removed IV, Per provider okay to not insert another IV, pt is will be discharged today and no IV medication are ordered or scheduled.

## 2025-06-07 NOTE — CASE MANAGEMENT
Case Management Discharge Planning Note    Patient name Mo Berry  Location [facility] 917/[facility] 917-01 MRN [id number]  : 1950 Date 2025       Current Admission Date: 2025  Current Admission Diagnosis:Syphilis   Patient Active Problem List    Diagnosis Date Noted    Rapidly progressive dementia (HCC) 2025    Encephalopathy acute 2025    Urinary incontinence 2025    Chronic kidney disease, stage 3b (HCC) 2025    Vitamin B12 deficiency 2025    Syphilis 2025    Hypercholesterolemia 2025    Memory deficit 2025    Tremor 2025    Primary osteoarthritis involving multiple joints 2025    Anemia 10/05/2017      LOS (days): 7  Geometric Mean LOS (GMLOS) (days): 4.7  Days to GMLOS:-2     OBJECTIVE:  Risk of Unplanned Readmission Score: 12.63         Current admission status: Inpatient   Preferred Pharmacy:   [id number] [place] - [address]  [address]  [place] [address]  Phone: [phone] Fax: [phone]    Primary Care Provider: Selvin Dumont DO    Primary Insurance: MEDICARE  Secondary Insurance: [facility]    DISCHARGE DETAILS:    LEÓN was asked by provider to confirm with Master that pt will be able to receive his Benzathine penicillin 2,400,000 units weekly IM with remaining doses on 6/10 and .  CM spoke to nursing supervisor, Kalia, who states he needs to speak to his supervisor about it and will get back to LEÓN.  Kalia is aware that pt has transportation arranged for 1130 today.     Update:  CM was notified that the medications are not a problem and pt can admit today as planned.

## 2025-06-07 NOTE — ASSESSMENT & PLAN NOTE
Lab Results   Component Value Date    EGFR 44 06/07/2025    EGFR 45 06/06/2025    EGFR 46 06/05/2025    CREATININE 1.52 (H) 06/07/2025    CREATININE 1.49 (H) 06/06/2025    CREATININE 1.46 (H) 06/05/2025    -Cr stable

## 2025-06-07 NOTE — PROGRESS NOTES
Attempted to call supervisor at [facility] twice, was told supervisor would call back. Also left a separate recorded message with my direct extension.

## 2025-06-07 NOTE — CASE MANAGEMENT
Case Management Discharge Planning Note    Patient name Mo Berry  Location [facility] 917/[facility] 917-01 MRN [id number]  : 1950 Date 2025       Current Admission Date: 2025  Current Admission Diagnosis:Syphilis   Patient Active Problem List    Diagnosis Date Noted    Rapidly progressive dementia (HCC) 2025    Encephalopathy acute 2025    Urinary incontinence 2025    Chronic kidney disease, stage 3b (HCC) 2025    Vitamin B12 deficiency 2025    Syphilis 2025    Hypercholesterolemia 2025    Memory deficit 2025    Tremor 2025    Primary osteoarthritis involving multiple joints 2025    Anemia 10/05/2017      LOS (days): 7  Geometric Mean LOS (GMLOS) (days): 4.7  Days to GMLOS:-1.9     OBJECTIVE:  Risk of Unplanned Readmission Score: 12.63         Current admission status: Inpatient   Preferred Pharmacy:   [id number] [place] - [address]  [address]  [place] [address]  Phone: [phone] Fax: [phone]    Primary Care Provider: Selvin Dumont DO    Primary Insurance: MEDICARE  Secondary Insurance: [facility]    DISCHARGE DETAILS:    Discharge planning discussed with:: Ludivina  Freedom of Choice: Yes     CM contacted family/caregiver?: Yes  Were Treatment Team discharge recommendations reviewed with patient/caregiver?: Yes  Did patient/caregiver verbalize understanding of patient care needs?: N/A- going to facility  Were patient/caregiver advised of the risks associated with not following Treatment Team discharge recommendations?: Yes    Contacts  Contact Method: Phone  Phone Number: [phone]  Reason/Outcome: Discharge Planning, Referral    Requested Home Health Care         Is the patient interested in HHC at discharge?: No    DME Referral Provided  Referral made for DME?: No    Other Referral/Resources/Interventions Provided:  Interventions: Transportation, Short Term Rehab  Referral  Comments: Pt to admit to STR at [place] today.  Transportation was arranged for 1130 with [employer].  CM udpated provider, RN, and family.  RN provided with contact information for report (Phone: [phone] Fax: [phone])         Treatment Team Recommendation: Short Term Rehab  Discharge Destination Plan:: Short Term Rehab  Transport at Discharge : [facility]o         IMM Given (Date):: 06/07/25  IMM Given to:: Family  Family notified:: Ludivina

## 2025-06-07 NOTE — DISCHARGE SUMMARY
Discharge Summary - Hospitalist   Name: Mo Berry 75 y.o. male I MRN: [id number]  Unit/Bed#: [facility] 917-01 I Date of Admission: 5/31/2025   Date of Service: 6/7/2025 I Hospital Day: 7     Assessment & Plan  Syphilis  -Patient had rapid decline previously independent 6 weeks prior and oriented x 4 according to the daughter.  Deteriorated the past 6 weeks where now patient is fully dependent with all ADLs and is no longer oriented and just recently started having bowel and bladder incontinence  -TSH normal,  -HCV, Lyme, heavy metals, HIV were all negative  -RPR was positive and treponema antibody screen was also positive  -ID/neuro following  -There was concern for neurosyphilis. LP was done 6/2/25: CSF culture/prot/gluc, meningitis encephalitis panel, JANELL virus, cryptococcus antigen, CSF VDRL normal  -MRI brain: Limited exam without acute infarct. NeuroQuant analysis was performed: Images are severely motion limited. Quantitative analysis does not demonstrate evidence of mesial temporal focus volume loss, however the results may be unreliable. Recommend repeat exam when possible.   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 with benzathine penicillin 2,400,000 units weekly IM x 3 weeks 6/3, 6/10, 6/17    Chronic kidney disease, stage 3b (HCC)  Lab Results   Component Value Date    EGFR 44 06/07/2025    EGFR 45 06/06/2025    EGFR 46 06/05/2025    CREATININE 1.52 (H) 06/07/2025    CREATININE 1.49 (H) 06/06/2025    CREATININE 1.46 (H) 06/05/2025    -Cr stable  Encephalopathy acute  - Neuro following, etiology remains unclear at this time  - MRI brain above and without acute intracranial pathology  - EEG 6/2 without epileptiform activity  - Autoimmune dementia profile negative  - Further workup as above, so far unremarkable  - Will need outpatient neuropsych/cognitive testing  Rapidly progressive dementia (HCC)  -see above  Vitamin B12 deficiency  Recent vitamin B12 level is 87 showing vitamin B12 deficiency and pt was  started on supplementation by PCP as outpatient.  B12 level > 7500.  Anemia  -Hb stable  -normocytic     Medical Problems       Resolved Problems  Date Reviewed: 5/14/2025   None       Discharging Physician / Practitioner: Omar Melo DO  PCP: Selvin Dumont DO  Admission Date:   Admission Orders (From admission, onward)       Ordered        05/31/25 1731  INPATIENT ADMISSION  Once                          Discharge Date: 06/07/25    Next Steps for Physician/AP Assuming Care:  Of high importance is to complete empiric treatment for suspected late latent syphilis with benzathine penicillin 2,400,000 units weekly IM with remaining doses on 6/10 and 6/17   Will need outpatient neuropsych/cognitive testing  Patient needs ID and Neurology follow up after discharge    Test Results Pending at Discharge (will require follow up):  None    Medication Changes for Discharge & Rationale:   Benzathine penicillin 2,400,000 units weekly IM with remaining doses on 6/10 and 6/17   See after visit summary for reconciled discharge medications provided to patient and/or family.     Consultations During Hospital Stay:  ID  Neurology    Procedures Performed:   LP    Significant Findings / Test Results:   Positive: RPR and treponema antibody screen  Negative: HCV, Lyme, heavy metals, HIV, autoimmune dementia profile, LP (6/2/25) CSF culture/prot/gluc, meningitis encephalitis panel, JANELL virus, cryptococcus antigen, CSF VDRL normal  EEG: without epileptiform activity  MRI Brain: Limited exam without acute infarct. NeuroQuant analysis was performed: Images are severely motion limited. Quantitative analysis does not demonstrate evidence of mesial temporal focus volume loss, however the results may be unreliable. Recommend repeat exam when possible.     Incidental Findings:   CT CAP w/o (5/31/2025) showed mild increased attenuation of the mesentery with small lymph nodes suggesting mesenteric panniculitis and mild emphysema and coronary  "calcifications.    Hospital Course:   Mo Berry is a 75 y.o. male patient who originally presented to the hospital on 5/31/2025 due to rapid decline over the past six weeks AAOx1 (self) and recently became fully dependent in terms of all ADLs and began having bowel and bladder incontinence.  Outpatient workup was positive for RPR and treponemal antibody and he was suspected to have neurosyphilis.  Extensive workup including EEG, MRI brain, LP, and the following labs were all negative: HCV, Lyme, heavy metals, HIV, autoimmune dementia profile, LP (6/2/25) CSF culture/prot/gluc, meningitis encephalitis panel, JANELL virus, cryptococcus antigen, CSF VDRL normal.  However the MRI brain was limited due to motion artifact.  Infectious disease and neurology were consulted and closely followed during the course of his stay.  He began treatment for assumed late latent syphilis with benzathine penicillin 2,400,000 units weekly IM with plans for doses on 6/3 (received), 6/10 (planned), and 6/17 (planned).  He remains at his baseline encephalopathic state and was determined medically stable for discharge to rehab.  Given his baseline encephalopathy, he will likely require ongoing care at a facility unless his empiric treatment for syphilis resolves his encephalopathy.  Discussed discharge plans with daughter, Lily, who is in agreement.    Please see above list of diagnoses and related plan for additional information.     Discharge Day Visit / Exam:   Subjective: NAEON, patient continues to form words which are nonsensical, pleasant, good appetite, no behavior issues per discussion with nursing, occasionally meaningfully follows commands    Vitals: Blood Pressure: 111/75 (06/07/25 0718)  Pulse: 82 (06/07/25 0718)  Temperature: 97.8 °F (36.6 °C) (06/07/25 0718)  Temp Source: Oral (06/03/25 1522)  Respirations: 16 (06/07/25 0718)  Height: 5' 10\" (177.8 cm) (06/03/25 1522)  Weight - Scale: 70 kg (154 lb 5.2 oz) (06/03/25 " 1522)  SpO2: 94 % (06/07/25 0732)    Physical Exam   Gen: remains NAD, Awake and alert but not oriented, well developed, well nourished  Eyes: EOMI, PERRLA, no scleral icterus  ENMT:  no nasal discharge, no otic discharge, moist mucous membranes  Neck:  Supple  Cardiovascular: Continues to remain regular rate and rhythm, normal S1-S2, no murmurs, rubs, or gallops  Lungs: To remain clear to auscultation bilaterally anteriorly, no wheezes, or rales, or rhonchi  Abdomen: Remains positive bowel sounds, soft, nontender, nondistended  Skin:  Intact, no obvious lesions or rashes, no edema  Neuro: Cranial nerves 2-12 are intact, non-focal     Discussion with Family: Patient declined call to .     Discharge instructions/Information to patient and family:   See after visit summary for information provided to patient and family.      Provisions for Follow-Up Care:  See after visit summary for information related to follow-up care and any pertinent home health orders.      Mobility at time of Discharge:   Basic Mobility Inpatient Raw Score: 11  JH-HLM Goal: 4: Move to chair/commode  JH-HLM Achieved: 4: Move to chair/commode  HLM Goal achieved. Continue to encourage appropriate mobility.     Disposition:   Acute Rehab at short-term rehab at [place]    Planned Readmission: None    Administrative Statements   Discharge Statement:  I have spent a total time of 45 minutes in caring for this patient on the day of the visit/encounter. >30 minutes of time was spent on: Diagnostic results, Prognosis, Risks and benefits of tx options, Instructions for management, Patient and family education, Importance of tx compliance, Risk factor reductions, Impressions, Counseling / Coordination of care, Documenting in the medical record, Reviewing / ordering tests, medicine, procedures  , and Communicating with other healthcare professionals .    **Please Note: This note may have been constructed using a voice recognition system**

## 2025-06-09 ENCOUNTER — TELEPHONE (OUTPATIENT)
Age: 75
End: 2025-06-09

## 2025-06-09 ENCOUNTER — PATIENT OUTREACH (OUTPATIENT)
Dept: CASE MANAGEMENT | Facility: OTHER | Age: 75
End: 2025-06-09

## 2025-06-09 LAB
ALB CSF/SERPL: 5 {RATIO} (ref 0–8)
ALBUMIN CSF-MCNC: 22 MG/DL (ref 15–55)
ALBUMIN SERPL-MCNC: 4.1 G/DL (ref 3.8–4.8)
IGG CSF-MCNC: 5 MG/DL (ref 0–10.3)
IGG SERPL-MCNC: 1469 MG/DL (ref 603–1613)
IGG SYNTH RATE SER+CSF CALC-MRATE: 1.9 MG/DAY
IGG/ALB CLEAR SER+CSF-RTO: 0.6 (ref 0–0.7)
IGG/ALB CSF: 0.23 {RATIO} (ref 0–0.25)
MBP CSF-MCNC: 1.3 NG/ML (ref 0–5.4)
OLIGOCLONAL BANDS.IT SER+CSF QL: NORMAL

## 2025-06-09 NOTE — TELEPHONE ENCOUNTER
Patient's daughter was called and made aware that a copy of their father's recent visit was printed for them to .

## 2025-06-09 NOTE — TELEPHONE ENCOUNTER
Patients daughter Ludivina called. She is on communication consent. She stated she needs a summary of the patients diagnosis showing his decline in memory for social security, She would like a callback when it is ready to be picked up.

## 2025-06-10 ENCOUNTER — PATIENT OUTREACH (OUTPATIENT)
Dept: CASE MANAGEMENT | Facility: OTHER | Age: 75
End: 2025-06-10

## 2025-06-10 NOTE — PROGRESS NOTES
utpatient care management referral via HRR report 6/9/25. Discharged to [facility] 6/7/25. Email sent to facility to inform them I will be following the patient during their skilled stay.  This Admin Coordinator will continue to monitor via chart review.

## 2025-06-11 ENCOUNTER — TELEPHONE (OUTPATIENT)
Dept: OTHER | Facility: HOSPITAL | Age: 75
End: 2025-06-11

## 2025-06-11 DIAGNOSIS — A53.9 SYPHILIS: ICD-10-CM

## 2025-06-11 NOTE — TELEPHONE ENCOUNTER
Mo Berry is a 75 y.o. male patient who originally presented to the hospital on 5/31/2025 due to rapid decline over the past six weeks AAOx1 (self) and recently became fully dependent in terms of all ADLs and began having bowel and bladder incontinence.  Outpatient workup was positive for RPR and treponemal antibody and he was suspected to have neurosyphilis.  Extensive workup including EEG, MRI brain, LP, and the following labs were all negative: HCV, Lyme, heavy metals, HIV, autoimmune dementia profile, LP (6/2/25) CSF culture/prot/gluc, meningitis encephalitis panel, JANELL virus, cryptococcus antigen, CSF VDRL normal.  However the MRI brain was limited due to motion artifact.  Infectious disease and neurology were consulted and closely followed during the course of his stay.  He began treatment for assumed late latent syphilis with benzathine penicillin 2,400,000 units weekly IM with plans for doses on 6/3 (received), 6/10 (planned), and 6/17 (planned).  He remains at his baseline encephalopathic state and was determined medically stable for discharge to rehab.  Given his baseline encephalopathy, he will likely require ongoing care at a facility unless his empiric treatment for syphilis resolves his encephalopathy.  Discussed discharge plans with daughter, Lily, who is in agreement.     I received a message from nursing which stated they were notified by the patient's outpatient pharmacy ([facility]) that they were unable to fill his penicillin G, and were unable to order this.  I called multiple pharmacies in the area of his original pharmacy, and none of them had this in stock.  Subsequently, I called [facility]'s home*pharmacy at [place] which stated that they have this in stock.  I wrote a prescription for his remaining 2 doses as detailed above.  I called the patient's daughter to discuss these efforts, and she agreed and will coordinate picking up this prescription between herself and his nursing home.    Omar  Kameron, DO

## 2025-06-19 ENCOUNTER — PATIENT OUTREACH (OUTPATIENT)
Dept: CASE MANAGEMENT | Facility: OTHER | Age: 75
End: 2025-06-19

## 2025-06-19 NOTE — PROGRESS NOTES
Chart review completed.  Email sent to facility requesting update on patient.   This care manager assistant will continue to monitor via chart review throughout SNF/STR Surveillance episode.    Update received from [facility] no LCD at this time This Admin Coordinator will continue to monitor via chart review.

## 2025-06-25 ENCOUNTER — PATIENT OUTREACH (OUTPATIENT)
Dept: CASE MANAGEMENT | Facility: OTHER | Age: 75
End: 2025-06-25

## 2025-06-25 NOTE — PROGRESS NOTES
Chart review completed.  Email sent to facility requesting update on patient.   This care manager assistant will continue to monitor via chart review throughout SNF/STR Surveillance episode.   Update received from [facility] the patient continues with therapy no LCD at this time. I have removed myself from the care team, updated the Care Coordination note, and closed the Care Transitions program.

## 2025-07-01 LAB — HBA1C MFR BLD HPLC: 6 %

## 2025-07-09 ENCOUNTER — TELEPHONE (OUTPATIENT)
Age: 75
End: 2025-07-09

## 2025-07-09 ENCOUNTER — TRANSITIONAL CARE MANAGEMENT (OUTPATIENT)
Dept: FAMILY MEDICINE CLINIC | Facility: CLINIC | Age: 75
End: 2025-07-09

## 2025-07-09 NOTE — TELEPHONE ENCOUNTER
Reached out to Ludivina. Patient received all three of his injections 6/3, 6/10, 6/17. No further follow up with us.    Left message for CB.

## 2025-07-10 ENCOUNTER — TELEPHONE (OUTPATIENT)
Age: 75
End: 2025-07-10

## 2025-07-10 ENCOUNTER — HOME CARE VISIT (OUTPATIENT)
Dept: HOME HEALTH SERVICES | Facility: HOME HEALTHCARE | Age: 75
End: 2025-07-10

## 2025-07-10 DIAGNOSIS — R32 URINARY INCONTINENCE, UNSPECIFIED TYPE: ICD-10-CM

## 2025-07-10 NOTE — TELEPHONE ENCOUNTER
Patients daughter stated the insurance does cover the incontinence supplies. Patients daughter stated to please fax the script to [facility] at [phone]

## 2025-07-10 NOTE — TELEPHONE ENCOUNTER
Patient daughter called in requesting Dr. Dumont to send an script for Incontinence supplies like pull ups linears pad. She is going to check with her dad's insurance if covered or not. And will us back to inform on the same. Thanks

## 2025-07-11 ENCOUNTER — HOME CARE VISIT (OUTPATIENT)
Dept: HOME HEALTH SERVICES | Facility: HOME HEALTHCARE | Age: 75
End: 2025-07-11

## 2025-07-11 RX ORDER — INCONTINENCE PAD,LINER,DISP
EACH MISCELLANEOUS
Qty: 150 EACH | Refills: 3 | Status: SHIPPED | OUTPATIENT
Start: 2025-07-11 | End: 2025-07-15 | Stop reason: SDUPTHER

## 2025-07-11 NOTE — TELEPHONE ENCOUNTER
1. Urinary incontinence, unspecified type  -     Incontinence Supply Disposable (Depend Undergarments) MISC; Use every 4-6 hours as needed    Rx printed for faxing.

## 2025-07-14 ENCOUNTER — TELEPHONE (OUTPATIENT)
Dept: NEUROLOGY | Facility: CLINIC | Age: 75
End: 2025-07-14

## 2025-07-14 NOTE — TELEPHONE ENCOUNTER
Patients daughter called to confirm that patients order for incontinence supplies had been placed. Patients daughter advised as she is listed on the medical communication consent.

## 2025-07-15 ENCOUNTER — HOME CARE VISIT (OUTPATIENT)
Dept: HOME HEALTH SERVICES | Facility: HOME HEALTHCARE | Age: 75
End: 2025-07-15
Attending: INTERNAL MEDICINE

## 2025-07-15 ENCOUNTER — TELEPHONE (OUTPATIENT)
Age: 75
End: 2025-07-15

## 2025-07-15 VITALS — SYSTOLIC BLOOD PRESSURE: 112 MMHG | DIASTOLIC BLOOD PRESSURE: 64 MMHG | OXYGEN SATURATION: 99 % | HEART RATE: 80 BPM

## 2025-07-15 RX ORDER — INCONTINENCE PAD,LINER,DISP
EACH MISCELLANEOUS
Qty: 150 EACH | Refills: 3 | Status: SHIPPED | OUTPATIENT
Start: 2025-07-15

## 2025-07-16 NOTE — CASE COMMUNICATION
Pt assessed for  services and not admitted as pt is not home bound.  Pt would benefit from outpatient therapy in the home.  Referral [facility] to [facility].

## 2025-07-17 ENCOUNTER — TELEPHONE (OUTPATIENT)
Age: 75
End: 2025-07-17

## 2025-07-17 NOTE — TELEPHONE ENCOUNTER
Patient called the RX Refill Line. Message is being forwarded to the office.     Patient states [facility] has not received the fax. She spoke to them this morning and they gave her another fax number that goes directly to them. Please fax script to [facility] at [phone]
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
## 2025-07-17 NOTE — TELEPHONE ENCOUNTER
LSW left message for patient's daughter to CB to confirm appt and to complete SW intake. LSW provided direct number for a CB.

## 2025-07-18 ENCOUNTER — TELEPHONE (OUTPATIENT)
Dept: FAMILY MEDICINE CLINIC | Facility: CLINIC | Age: 75
End: 2025-07-18

## 2025-07-18 PROBLEM — R26.2 AMBULATORY DYSFUNCTION: Status: ACTIVE | Noted: 2025-07-18

## 2025-07-18 NOTE — ASSESSMENT & PLAN NOTE
Recent hospitalization in 5/2025 due to a rapid progressive decline in mentation and cognition. Was independent and oriented x4, but suddenly was disoriented and needing full assistance with ADLs/IADLs which was not his norm. Per daughter was having bowel and bladder incontinence as well.   Evaluation during IP stay, LP showed concerns for Neurosyphilis.   Patient was seen and evaluated by Neurology and ID - treated with IM benzathine penicillin per recommendations.   Since hospitalization, family reports that patient has not improved with mentation and incontinence, constantly needs support with ADLs/IADLs.   Plan to continue to follow closely with Neurology and ID for further management and recommendations.

## 2025-07-18 NOTE — ASSESSMENT & PLAN NOTE
New incontinence due to recent illness with neurosyphilis.   Continues to utilize pads and depends to assist with incontinence.   Encouraged toileting schedules, skin care, and adequate hydration.   Will order additional incontinence supplies for support in the home.   Plan to monitor for acute changes in mental status which may be signs of underlying infections such as UTI.

## 2025-07-18 NOTE — TELEPHONE ENCOUNTER
Patients daughter called to advised they still have not received tehe [employer] faxes that have been sent     The company is now asking for a verbal rx for the incontinence supplies .      Please call [facility] AT : [phone]

## 2025-07-18 NOTE — ASSESSMENT & PLAN NOTE
Most recent B12 >7,500.   Would recommend discontinuing B12 supplementation and re-check in one month.

## 2025-07-18 NOTE — ASSESSMENT & PLAN NOTE
Patient is ambulating without assistive devices, but does have a cane and walker available at home if needs increase.   Recently fell prior to most recent hospitalization, continues to have ambulatory dysfunction.   Will order additional PT/OT services, recently completed PT/OT post-hospitalization at [facility] - just returned home 2 weeks ago.   Educated on fall precautions, encouraged participation for gait and strength training.

## 2025-07-21 ENCOUNTER — OFFICE VISIT (OUTPATIENT)
Age: 75
End: 2025-07-21
Payer: MEDICARE

## 2025-07-21 ENCOUNTER — TELEPHONE (OUTPATIENT)
Age: 75
End: 2025-07-21

## 2025-07-21 VITALS
BODY MASS INDEX: 24.05 KG/M2 | OXYGEN SATURATION: 100 % | TEMPERATURE: 98.4 F | HEART RATE: 89 BPM | WEIGHT: 168 LBS | DIASTOLIC BLOOD PRESSURE: 60 MMHG | SYSTOLIC BLOOD PRESSURE: 122 MMHG | HEIGHT: 70 IN

## 2025-07-21 DIAGNOSIS — R26.2 AMBULATORY DYSFUNCTION: ICD-10-CM

## 2025-07-21 DIAGNOSIS — F03.90 RAPIDLY PROGRESSIVE DEMENTIA (HCC): ICD-10-CM

## 2025-07-21 DIAGNOSIS — N39.498 OTHER URINARY INCONTINENCE: ICD-10-CM

## 2025-07-21 DIAGNOSIS — R41.3 MEMORY DEFICIT: ICD-10-CM

## 2025-07-21 DIAGNOSIS — F03.90 RAPIDLY PROGRESSIVE DEMENTIA (HCC): Primary | ICD-10-CM

## 2025-07-21 DIAGNOSIS — A53.9 SYPHILIS: ICD-10-CM

## 2025-07-21 DIAGNOSIS — R41.3 MEMORY DEFICIT: Primary | ICD-10-CM

## 2025-07-21 PROCEDURE — 99205 OFFICE O/P NEW HI 60 MIN: CPT

## 2025-07-21 RX ORDER — INCONTINENCE PAD,LINER,DISP
EACH MISCELLANEOUS EVERY 4 HOURS PRN
Qty: 150 EACH | Refills: 2 | Status: SHIPPED | OUTPATIENT
Start: 2025-07-21 | End: 2025-07-22

## 2025-07-21 RX ORDER — PEN NEEDLE, DIABETIC 32GX 5/32"
1 NEEDLE, DISPOSABLE MISCELLANEOUS EVERY 4 HOURS PRN
Qty: 150 EACH | Refills: 2 | Status: SHIPPED | OUTPATIENT
Start: 2025-07-21 | End: 2025-07-22

## 2025-07-21 NOTE — PROGRESS NOTES
[facility]  [address], [address]  [place] [address]  [phone]    Social Work Intake    Mo Berry presents today for a geriatric assessment, accompanied by daughter, Ludivina.  MSW met with Ludivina during visit to provide additional support and resources.  MSW made aware that patient resides with Ludivina, who is patient's primary caregiver, and interest in home care resources.  MSW provided and reviewed the following resources:   -Home care, Waiver, AAA  -OPTIONS Program  -Steps to Apply for Waiver/Resource list  -Caregiver Support Program  -[facility]ult Day Center list    MSW will remain available as needed.

## 2025-07-21 NOTE — TELEPHONE ENCOUNTER
Patient's daughter Ludivina called and asked what pharmacy the provider had send the Incontinence supplies to. Per chart review the script was sent to [facility] in [place]. Per Ludivina she went to [facility] who stated that they don't take orders for incontinence supplies and that they didn't received order either.     Per Ludivina she called [facility] who stated that they are having trouble with their fax machine.  Daughter states that patient's PCP has tried to send in fax but [facility] has not received it.  Per daughter [facility] stated that they can take a verbal order for the incontinence supplies. They can be reach at: [phone].     Please review and follow up with patient's daughter to update her if order was placed with [facility].

## 2025-07-21 NOTE — TELEPHONE ENCOUNTER
I called and let her know that I tried twice to call [facility] with the phone number that we were given. Both times no one answered and after 6 minutes it hung up. I will try again later.

## 2025-07-21 NOTE — ASSESSMENT & PLAN NOTE
Staging: Moderate Stage Dementia due to correlating symptoms, has been rapidly progressive due to recent neurosyphilis diagnosis. See further assessment under Syphilis.   Decision-making capacity: Does not have capacity for complex medical or financial decisions  Caregiver Review: SW met with daughter during visit to discuss options to assist within the home.   Safety:  Medication Review: Reviewed, appropriate for chronic conditions.   Driving: Not driving.  Fall Risk: High fall risk  ACP Review: POA in place.   Will order repeat MRI with [facility] - most recently completed during hospitalization, was inconclusive due to movement during test.   Plan to follow up at scheduled care conference.

## 2025-07-22 ENCOUNTER — TELEPHONE (OUTPATIENT)
Age: 75
End: 2025-07-22

## 2025-07-22 ENCOUNTER — OFFICE VISIT (OUTPATIENT)
Dept: FAMILY MEDICINE CLINIC | Facility: CLINIC | Age: 75
End: 2025-07-22
Payer: MEDICARE

## 2025-07-22 VITALS
BODY MASS INDEX: 23.91 KG/M2 | WEIGHT: 167 LBS | SYSTOLIC BLOOD PRESSURE: 116 MMHG | DIASTOLIC BLOOD PRESSURE: 60 MMHG | HEIGHT: 70 IN | OXYGEN SATURATION: 95 % | HEART RATE: 66 BPM

## 2025-07-22 DIAGNOSIS — R25.1 TREMOR: ICD-10-CM

## 2025-07-22 DIAGNOSIS — G93.40 ENCEPHALOPATHY ACUTE: Primary | ICD-10-CM

## 2025-07-22 DIAGNOSIS — E78.00 HYPERCHOLESTEROLEMIA: ICD-10-CM

## 2025-07-22 DIAGNOSIS — A53.9 SYPHILIS: ICD-10-CM

## 2025-07-22 DIAGNOSIS — N39.498 OTHER URINARY INCONTINENCE: ICD-10-CM

## 2025-07-22 DIAGNOSIS — F03.90 RAPIDLY PROGRESSIVE DEMENTIA (HCC): ICD-10-CM

## 2025-07-22 DIAGNOSIS — E53.8 VITAMIN B12 DEFICIENCY: ICD-10-CM

## 2025-07-22 DIAGNOSIS — N18.32 CHRONIC KIDNEY DISEASE, STAGE 3B (HCC): ICD-10-CM

## 2025-07-22 DIAGNOSIS — D64.9 ANEMIA, UNSPECIFIED TYPE: ICD-10-CM

## 2025-07-22 PROBLEM — R41.3 MEMORY DEFICIT: Status: RESOLVED | Noted: 2025-04-23 | Resolved: 2025-07-22

## 2025-07-22 PROCEDURE — 99495 TRANSJ CARE MGMT MOD F2F 14D: CPT | Performed by: FAMILY MEDICINE

## 2025-07-22 RX ORDER — INCONTINENCE PAD,LINER,DISP
EACH MISCELLANEOUS EVERY 4 HOURS PRN
Qty: 150 EACH | Refills: 2 | Status: SHIPPED | OUTPATIENT
Start: 2025-07-22

## 2025-07-22 NOTE — TELEPHONE ENCOUNTER
Patient's daughter Ludivina to see if the incontinence [facility], I look and explained to her that the office is trying to reach them but no one answered the per the note in patient's chart she said I understand and said she will call [facility] to see if she can get another phone number to call and call us back with the new number

## 2025-07-22 NOTE — TELEPHONE ENCOUNTER
Spoke with Abby at [facility] needed a new script from  because he is the treating physician. New script placed by . Faxing to [facility] and will await for CMN form for TS to sign and send back. Once approved by insurance they will send out supplies to patient. Relayed same message to daughter Ludivina. She was thankful for all our help. New script being faxed to [phone]. If need to call [facility] please call [phone]

## 2025-07-22 NOTE — ASSESSMENT & PLAN NOTE
Status post hospitalization and rehab patient is following with neurology as well as geriatrics  Orders:  •  CBC; Future  •  Comprehensive metabolic panel; Future  •  Lipid Panel with Direct LDL reflex; Future  •  TSH, 3rd generation with Free T4 reflex; Future  •  UA (URINE) with reflex to Scope; Future  •  Vitamin D 25 hydroxy; Future  •  Vitamin B12; Future  •  Folate; Future

## 2025-07-22 NOTE — ASSESSMENT & PLAN NOTE
To follow with geriatrics as well as neurology  Orders:  •  CBC; Future  •  Comprehensive metabolic panel; Future  •  Lipid Panel with Direct LDL reflex; Future  •  TSH, 3rd generation with Free T4 reflex; Future  •  UA (URINE) with reflex to Scope; Future  •  Vitamin D 25 hydroxy; Future  •  Vitamin B12; Future  •  Folate; Future

## 2025-07-22 NOTE — TELEPHONE ENCOUNTER
Abby is calling from [employer] regarding the prescription for incontinence supplies.    She said that Dr Mcclelland is listed under the [address] address and wants to know the correct address, phone and fax for him so that she can add him as a new doctor.    Please fax the information to her @[phone]

## 2025-07-22 NOTE — ASSESSMENT & PLAN NOTE
Blood work is ordered  Orders:  •  CBC; Future  •  Comprehensive metabolic panel; Future  •  Lipid Panel with Direct LDL reflex; Future  •  TSH, 3rd generation with Free T4 reflex; Future  •  UA (URINE) with reflex to Scope; Future  •  Vitamin D 25 hydroxy; Future  •  Vitamin B12; Future  •  Folate; Future

## 2025-07-22 NOTE — ASSESSMENT & PLAN NOTE
Lab Results   Component Value Date    EGFR 44 06/07/2025    EGFR 45 06/06/2025    EGFR 46 06/05/2025    CREATININE 1.52 (H) 06/07/2025    CREATININE 1.49 (H) 06/06/2025    CREATININE 1.46 (H) 06/05/2025   Blood work is ordered  Orders:  •  CBC; Future  •  Comprehensive metabolic panel; Future  •  Lipid Panel with Direct LDL reflex; Future  •  TSH, 3rd generation with Free T4 reflex; Future  •  UA (URINE) with reflex to Scope; Future  •  Vitamin D 25 hydroxy; Future  •  Vitamin B12; Future  •  Folate; Future

## 2025-07-22 NOTE — PATIENT INSTRUCTIONS
Patient to follow-up with specialist per their recommendations and as planned  Continue present therapy  Return in 3 months for office visit and blood work sooner if needed

## 2025-07-22 NOTE — ASSESSMENT & PLAN NOTE
Treated by infectious disease  Orders:  •  CBC; Future  •  Comprehensive metabolic panel; Future  •  Lipid Panel with Direct LDL reflex; Future  •  TSH, 3rd generation with Free T4 reflex; Future  •  UA (URINE) with reflex to Scope; Future  •  Vitamin D 25 hydroxy; Future  •  Vitamin B12; Future  •  Folate; Future

## 2025-07-22 NOTE — PROGRESS NOTES
Transition of Care Visit:  Name: Mo Berry      : 1950      MRN: [id number]  Encounter Provider: Selvin Dumont DO  Encounter Date: 2025   Encounter department: [facility]  Return in 3 months for office visit and blood work sooner if needed    Assessment & Plan  Encephalopathy acute  Status post hospitalization and rehab patient is following with neurology as well as geriatrics  Orders:  •  CBC; Future  •  Comprehensive metabolic panel; Future  •  Lipid Panel with Direct LDL reflex; Future  •  TSH, 3rd generation with Free T4 reflex; Future  •  UA (URINE) with reflex to Scope; Future  •  Vitamin D 25 hydroxy; Future  •  Vitamin B12; Future  •  Folate; Future    Rapidly progressive dementia (HCC)  To follow with geriatrics as well as neurology  Orders:  •  CBC; Future  •  Comprehensive metabolic panel; Future  •  Lipid Panel with Direct LDL reflex; Future  •  TSH, 3rd generation with Free T4 reflex; Future  •  UA (URINE) with reflex to Scope; Future  •  Vitamin D 25 hydroxy; Future  •  Vitamin B12; Future  •  Folate; Future    Chronic kidney disease, stage 3b (HCC)  Lab Results   Component Value Date    EGFR 44 2025    EGFR 45 2025    EGFR 46 2025    CREATININE 1.52 (H) 2025    CREATININE 1.49 (H) 2025    CREATININE 1.46 (H) 2025   Blood work is ordered  Orders:  •  CBC; Future  •  Comprehensive metabolic panel; Future  •  Lipid Panel with Direct LDL reflex; Future  •  TSH, 3rd generation with Free T4 reflex; Future  •  UA (URINE) with reflex to Scope; Future  •  Vitamin D 25 hydroxy; Future  •  Vitamin B12; Future  •  Folate; Future    Anemia, unspecified type   blood work is ordered    Orders:  •  CBC; Future  •  Comprehensive metabolic panel; Future  •  Lipid Panel with Direct LDL reflex; Future  •  TSH, 3rd generation with Free T4 reflex; Future  •  UA (URINE) with reflex to Scope; Future  •  Vitamin D 25 hydroxy; Future  •  Vitamin  B12; Future  •  Folate; Future    Tremor  Patient to follow-up with neurology  Orders:  •  CBC; Future  •  Comprehensive metabolic panel; Future  •  Lipid Panel with Direct LDL reflex; Future  •  TSH, 3rd generation with Free T4 reflex; Future  •  UA (URINE) with reflex to Scope; Future  •  Vitamin D 25 hydroxy; Future  •  Vitamin B12; Future  •  Folate; Future    Hypercholesterolemia  Blood work is ordered  Orders:  •  CBC; Future  •  Comprehensive metabolic panel; Future  •  Lipid Panel with Direct LDL reflex; Future  •  TSH, 3rd generation with Free T4 reflex; Future  •  UA (URINE) with reflex to Scope; Future  •  Vitamin D 25 hydroxy; Future  •  Vitamin B12; Future  •  Folate; Future    Vitamin B12 deficiency  Blood work is ordered  Orders:  •  CBC; Future  •  Comprehensive metabolic panel; Future  •  Lipid Panel with Direct LDL reflex; Future  •  TSH, 3rd generation with Free T4 reflex; Future  •  UA (URINE) with reflex to Scope; Future  •  Vitamin D 25 hydroxy; Future  •  Vitamin B12; Future  •  Folate; Future    Syphilis  Treated by infectious disease  Orders:  •  CBC; Future  •  Comprehensive metabolic panel; Future  •  Lipid Panel with Direct LDL reflex; Future  •  TSH, 3rd generation with Free T4 reflex; Future  •  UA (URINE) with reflex to Scope; Future  •  Vitamin D 25 hydroxy; Future  •  Vitamin B12; Future  •  Folate; Future      Depression Screening and Follow-up Plan: Patient was screened for depression during today's encounter. They screened negative with a PHQ-2 score of 0.          History of Present Illness     Transitional Care Management Review:   Mo Berry is a 75 y.o. male here for TCM follow up.     During the TCM phone call patient stated:  TCM Call (since 7/8/2025)     Date and time call was made  7/9/2025  2:10 PM    Hospital care reviewed  Records not available    Patient was hospitialized at  Other (comment)    Comment  [facility]    Date of Admission  06/07/25    Date of discharge   "07/09/25    Diagnosis  Syphilis    Disposition  Home    Current Symptoms  None      TCM Call (since 7/8/2025)     Post hospital issues  None    Scheduled for follow up?  Yes    I have advised the patient to call PCP with any new or worsening symptoms  MARLENE Godfrey pt has TCM appt on 7/22 with TS    Living Arrangements  Alone        Patient is here for TCM.  Patient was admitted to [facility] from May 31 to June 7.  From June 7 to July 9 he was at acute rehab, [facility].  Patient is now home with family.  Patient does have follow-up with geriatrics and neurology.  Patient's mental function is decreasing.  Has been treated for his syphilis with infectious disease      Review of Systems   Constitutional: Negative.    HENT: Negative.     Eyes: Negative.    Respiratory: Negative.     Cardiovascular: Negative.    Gastrointestinal: Negative.    Endocrine: Negative.    Genitourinary: Negative.    Musculoskeletal: Negative.    Skin: Negative.    Allergic/Immunologic: Negative.    Neurological:         HPI   Hematological: Negative.    Psychiatric/Behavioral: Negative.       Objective   /60 (BP Location: Right arm, Patient Position: Sitting, Cuff Size: Standard)   Pulse 66   Ht 5' 9.5\" (1.765 m)   Wt 75.8 kg (167 lb)   SpO2 95%   BMI 24.31 kg/m²     Physical Exam  Vitals and nursing note reviewed.   Constitutional:       General: He is not in acute distress.     Appearance: Normal appearance. He is not ill-appearing, toxic-appearing or diaphoretic.   HENT:      Head: Normocephalic and atraumatic.      Mouth/Throat:      Mouth: Mucous membranes are moist.     Eyes:      General: No scleral icterus.    Neck:      Vascular: No carotid bruit.     Cardiovascular:      Rate and Rhythm: Normal rate and regular rhythm.      Heart sounds: Normal heart sounds.   Pulmonary:      Effort: Pulmonary effort is normal.      Breath sounds: Normal breath sounds.   Abdominal:      Palpations: Abdomen is soft.      Tenderness: " There is no abdominal tenderness.      Comments: Positive bowel sounds     Musculoskeletal:      Cervical back: Neck supple.      Right lower leg: No edema.      Left lower leg: No edema.     Skin:     General: Skin is warm and dry.     Neurological:      General: No focal deficit present.      Mental Status: He is alert.      Comments: Pleasantly confused able to follow simple verbal commands only   Psychiatric:         Mood and Affect: Mood normal.       Medications have been reviewed by provider in current encounter

## 2025-07-22 NOTE — ASSESSMENT & PLAN NOTE
Patient to follow-up with neurology  Orders:  •  CBC; Future  •  Comprehensive metabolic panel; Future  •  Lipid Panel with Direct LDL reflex; Future  •  TSH, 3rd generation with Free T4 reflex; Future  •  UA (URINE) with reflex to Scope; Future  •  Vitamin D 25 hydroxy; Future  •  Vitamin B12; Future  •  Folate; Future

## 2025-07-24 ENCOUNTER — OFFICE VISIT (OUTPATIENT)
Dept: NEUROLOGY | Facility: CLINIC | Age: 75
End: 2025-07-24
Attending: FAMILY MEDICINE
Payer: MEDICARE

## 2025-07-24 VITALS
WEIGHT: 166.2 LBS | TEMPERATURE: 98 F | BODY MASS INDEX: 24.19 KG/M2 | SYSTOLIC BLOOD PRESSURE: 108 MMHG | DIASTOLIC BLOOD PRESSURE: 66 MMHG

## 2025-07-24 DIAGNOSIS — F40.240 CLAUSTROPHOBIA: ICD-10-CM

## 2025-07-24 DIAGNOSIS — R25.1 TREMOR: ICD-10-CM

## 2025-07-24 DIAGNOSIS — G20.C PARKINSONISM (HCC): Primary | ICD-10-CM

## 2025-07-24 PROCEDURE — G2211 COMPLEX E/M VISIT ADD ON: HCPCS | Performed by: PSYCHIATRY & NEUROLOGY

## 2025-07-24 PROCEDURE — 99215 OFFICE O/P EST HI 40 MIN: CPT | Performed by: PSYCHIATRY & NEUROLOGY

## 2025-07-24 RX ORDER — CARBIDOPA/LEVODOPA 25MG-250MG
1 TABLET ORAL 3 TIMES DAILY
Qty: 90 TABLET | Refills: 6 | Status: SHIPPED | OUTPATIENT
Start: 2025-07-24

## 2025-07-24 RX ORDER — LORAZEPAM 0.5 MG/1
TABLET ORAL
Qty: 2 TABLET | Refills: 0 | Status: SHIPPED | OUTPATIENT
Start: 2025-07-24

## 2025-07-24 NOTE — TELEPHONE ENCOUNTER
Abby from [facility] called to confirm if the office had received the letter of medical necessity for patients incontinence supplies. I advised Abby unfortunately at this time I did not see that it had been received. I advised to please refax however please return Abby call once received to notify. Please return call to advise at [phone].

## 2025-07-24 NOTE — TELEPHONE ENCOUNTER
Patient's daughter called back to follow up on paperwork for patient's incontinence supplies. Advised her of previous message that Abby from [facility] was going to refax certificate of medical necessity form as it has not been received as of yet. Daughter would like a callback with any update on this at [phone]. Thank you.

## 2025-07-24 NOTE — PROGRESS NOTES
Name: Mo Berry      : 1950      MRN: [id number]  Encounter Provider: Yue Lentz MD  Encounter Date: 2025   Encounter department: [facility]  :  Assessment & Plan          History of Present Illness   HPI   Review of Systems   Constitutional:  Negative for appetite change, fatigue and fever.   HENT: Negative.  Negative for hearing loss, tinnitus, trouble swallowing and voice change.    Eyes: Negative.  Negative for photophobia, pain and visual disturbance.   Respiratory: Negative.  Negative for shortness of breath.    Cardiovascular: Negative.  Negative for palpitations.   Gastrointestinal: Negative.  Negative for nausea and vomiting.   Endocrine: Negative.  Negative for cold intolerance.   Genitourinary: Negative.  Negative for dysuria, frequency and urgency.   Musculoskeletal:  Positive for back pain and gait problem. Negative for myalgias, neck pain and neck stiffness.   Skin: Negative.  Negative for rash.   Allergic/Immunologic: Negative.    Neurological:  Positive for weakness. Negative for dizziness, tremors, seizures, syncope, facial asymmetry, speech difficulty, light-headedness, numbness and headaches.   Hematological: Negative.  Does not bruise/bleed easily.   Psychiatric/Behavioral:  Positive for confusion. Negative for hallucinations and sleep disturbance.    All other systems reviewed and are negative.   I have personally reviewed the MA's review of systems and made changes as necessary.         Objective   Temp 98 °F (36.7 °C) (Temporal)   Wt 75.4 kg (166 lb 3.2 oz)   BMI 24.19 kg/m²     Physical Exam  Neurological Exam

## 2025-07-24 NOTE — PATIENT INSTRUCTIONS
Prescribed carbidopa-levodopa  mg    Day 1-3: Half a tablet at bedtime  Day 4-6: Half a tablet in the morning and at bedtime  Day 7-9: Half a tablet 3 times a day  Day 10-12: Half a tablet morning and afternoon.  Full tablet at bedtime  Day 13-15: 1 full tablet in the morning and at bedtime.  Half a tablet in the afternoon  Day >16: Full tablet 3 times a day.

## 2025-07-24 NOTE — PROGRESS NOTES
Name: Mo Berry      : 1950      MRN: [id number]  Encounter Provider: Yue Lentz MD  Encounter Date: 2025   Encounter department: [facility] [place]  :  Assessment & Plan  Parkinsonism (HCC)  Patient is a 75-year-old male with history of CKD, hyperlipidemia and alcohol abuse who presents asa hospital follow up.  Patient with a subacute decline in mental status and mobility. Onset at the beginning of this year. Cognitive issues ongoing 6 months prior to admission with a rapid decline 2 months PTA, with gait dysfunction 3 weeks PTA. Patient with falls, urine/bowel incontinence, stiffness, resting tremor, rigidity, confusion and dysarthria. Overall patient with a rapidly progressing symptoms.     CTH without hemorrhage. MRI significantly motion degraded. LP normal with exception of VDRL. JANELL panel and autoimmune panel negative. Treated for  late latent syphilis with benzathine penicillin 2,400,000 units weekly IM x 3 weeks On Physical exam today, patient with hypertonia at the left elbow/knee, resting tremor L>R, hypomimia, hypophonia and bradykinesia. Gait with reduced stride length and step height. Overall patient with difficulty following complex commands and lack of orientation.     At this time, patients symptoms and physical exam resembling parkinsons disease. The timing of symptom onset is rather quick for a PD diagnosis. I wonder if maybe he may have a more aggressive form of the disease. Recommend following up on MRI to rule out any damage to the basal ganglia. Senior care placed order. Prescribed ativan for claustrophobia. Will do a Sinemet trial and assess for improvement. I discussed VELIA scan vs Biopsy for synucleopathy. Daughter would like to think about it as he has been through so much testing. Recommend taking Thiamine 100 mg daily. Follow up with results.     Orders:    carbidopa-levodopa (SINEMET)  mg per tablet; Take 1 tablet by mouth 3 (three)  "times a day    Tremor    Orders:    Ambulatory Referral to Neurology    Claustrophobia    Orders:    LORazepam (Ativan) 0.5 mg tablet; Take one tab half hour prior to MRI and another tab if still anxious immediately prior to going into MRI          History of Present Illness   HPI     Patient is a 75-year-old male with history of CKD, hyperlipidemia and alcohol abuse who presents [facility] follow up. Patient was seen by inpatient neurology team last month for cognitive decline.      Patient presented to [facility] with unwitnessed falls. He was found to be covered in urine/feces. Patients memory issues and confusion had been ongoing for the previous 6 months, but 8 weeks prior to admission he had a rapid decline. Initially patient would be confused about the year and season. This later progressed to needing assistance in almost all ADLs and incomprehensible speech. 3 weeks prior to presentation his gait declined causing unsteadiness. Patient started to shuffle and required 1-2 people for transfers. There was concern for neurosyphilis given positive treponema (negative RPR). Treated for  late latent syphilis with benzathine penicillin 2,400,000 units weekly IM x 3 weeks     \"In the outpatient setting, his PCP checked syphilis screen which revealed positive Treponema, but RPR negative.  Based on ID notes, the serologic picture was consistent with prior syphilis, untreated or prior treatment.  Given no prior records of syphilis treatment, ID recommended treating for syphilis and considering the possibility of neurosyphilis given rapid decline in memory and ambulation \"     Workup:  EEG demonstrated some mild background slowing otherwise no electrographic seizures or interictal discharges.    Basic CSF studies were benign including protein, glucose, red cells, white cells, ME panel, cryptococcal antigen.  Leukemia/lymphoma negative   CMV negative    CSF autoimmune dementia panel negative    Serum autoimmune dementia panel " negative  CSF VDRL negaative   VDRL positive, however ID thinks this is likely representative of prior infection.  CSF JANELL virus negative   B12 >7,500  SPEP unremarkable.  B6 Normal   B1 low 63.6     MRI BRAIN NEUROQUANT WO CONTRAST (Final) 6/4/2025  1:22 AM    Impression  Limited exam without acute infarct.    NeuroQuant analysis was performed: Images are severely motion limited. Quantitative analysis does not demonstrate evidence of mesial temporal focus volume loss, however the results may be unreliable. Recommend repeat exam when possible.    CT C/A/P: No posttraumatic injury visualized.  Mild increased attenuation of the mesentery with small lymph nodes suggesting mesenteric panniculitis.  Mild emphysema and coronary calcifications.      Interval history:  Patient with poor insight   Living at home with his daughter   Allcohol abuse and smoker his entire life  Sober from alcohol x 4 years  Pill rolling tremor last 4 years      Review of Systems     Review of Systems   Constitutional:  Negative for appetite change, fatigue and fever.   HENT: Negative.  Negative for hearing loss, tinnitus, trouble swallowing and voice change.    Eyes: Negative.  Negative for photophobia, pain and visual disturbance.   Respiratory: Negative.  Negative for shortness of breath.    Cardiovascular: Negative.  Negative for palpitations.   Gastrointestinal: Negative.  Negative for nausea and vomiting.   Endocrine: Negative.  Negative for cold intolerance.   Genitourinary: Negative.  Negative for dysuria, frequency and urgency.   Musculoskeletal:  Positive for back pain and gait problem. Negative for myalgias, neck pain and neck stiffness.   Skin: Negative.  Negative for rash.   Allergic/Immunologic: Negative.    Neurological:  Positive for weakness. Negative for dizziness, tremors, seizures, syncope, facial asymmetry, speech difficulty, light-headedness, numbness and headaches.   Hematological: Negative.  Does not bruise/bleed easily.    Psychiatric/Behavioral:  Positive for confusion. Negative for hallucinations and sleep disturbance.    All other systems reviewed and are negative.    I have personally reviewed the MA's review of systems and made changes as necessary.         Objective   There were no vitals taken for this visit.    Physical Exam  Constitutional:       General: He is awake.     Neurological:      Deep Tendon Reflexes:      Reflex Scores:       Bicep reflexes are 2+ on the right side and 2+ on the left side.       Brachioradialis reflexes are 2+ on the right side and 2+ on the left side.    Neurological Exam  Mental Status  Awake.  Hypophonia   Hypomimia   Needs hints for orientation to person   Not oriented to place or time   Difficulty following complex instructions   .    Cranial Nerves  Decreased facial expressions   Wide stare   Normal EOM .    Motor  Normal muscle bulk throughout. Increased muscle tone. The following abnormal movements were seen:  Increase tone at the left elbow and left knee  Resting oscillating tremor in bilateral upper extremities left more so than the right   Bradykinesia .    Reflexes                                            Right                      Left  Brachioradialis                    2+                         2+  Biceps                                 2+                         2+  Patellar                                Tr                         Tr    Coordination    See tremor above   Decrement on foot tap and stomp on the left   .    Gait  Casual gait:  Not quite shuffling but has decreased step height and stride length   En bloc turning.

## 2025-07-25 NOTE — TELEPHONE ENCOUNTER
Formed signed and scanned by TS. Made patient daughter aware and she was thankful for our help and update.

## 2025-07-30 ENCOUNTER — TELEPHONE (OUTPATIENT)
Age: 75
End: 2025-07-30

## 2025-08-04 DIAGNOSIS — G20.B1 PARKINSON'S DISEASE WITH DYSKINESIA WITHOUT FLUCTUATING MANIFESTATIONS (HCC): Primary | ICD-10-CM

## 2025-08-04 PROBLEM — G20.A1 PARKINSON'S DISEASE WITHOUT DYSKINESIA OR FLUCTUATING MANIFESTATIONS (HCC): Status: ACTIVE | Noted: 2025-08-04

## 2025-08-09 ENCOUNTER — HOSPITAL ENCOUNTER (OUTPATIENT)
Dept: MRI IMAGING | Facility: HOSPITAL | Age: 75
Discharge: HOME | End: 2025-08-09
Payer: MEDICARE